# Patient Record
Sex: FEMALE | Race: WHITE | NOT HISPANIC OR LATINO | URBAN - METROPOLITAN AREA
[De-identification: names, ages, dates, MRNs, and addresses within clinical notes are randomized per-mention and may not be internally consistent; named-entity substitution may affect disease eponyms.]

---

## 2017-04-24 ENCOUNTER — EMERGENCY (EMERGENCY)
Facility: HOSPITAL | Age: 82
LOS: 1 days | Discharge: HOME | End: 2017-04-24
Attending: EMERGENCY MEDICINE | Admitting: EMERGENCY MEDICINE
Payer: MEDICARE

## 2017-04-24 VITALS
RESPIRATION RATE: 20 BRPM | SYSTOLIC BLOOD PRESSURE: 109 MMHG | OXYGEN SATURATION: 94 % | DIASTOLIC BLOOD PRESSURE: 62 MMHG | WEIGHT: 149.91 LBS | TEMPERATURE: 98 F | HEART RATE: 88 BPM | HEIGHT: 61 IN

## 2017-04-24 VITALS
SYSTOLIC BLOOD PRESSURE: 136 MMHG | HEART RATE: 84 BPM | TEMPERATURE: 98 F | DIASTOLIC BLOOD PRESSURE: 66 MMHG | OXYGEN SATURATION: 91 % | RESPIRATION RATE: 20 BRPM

## 2017-04-24 DIAGNOSIS — N18.4 CHRONIC KIDNEY DISEASE, STAGE 4 (SEVERE): ICD-10-CM

## 2017-04-24 DIAGNOSIS — I12.9 HYPERTENSIVE CHRONIC KIDNEY DISEASE WITH STAGE 1 THROUGH STAGE 4 CHRONIC KIDNEY DISEASE, OR UNSPECIFIED CHRONIC KIDNEY DISEASE: ICD-10-CM

## 2017-04-24 DIAGNOSIS — Z79.899 OTHER LONG TERM (CURRENT) DRUG THERAPY: ICD-10-CM

## 2017-04-24 DIAGNOSIS — Z79.82 LONG TERM (CURRENT) USE OF ASPIRIN: ICD-10-CM

## 2017-04-24 DIAGNOSIS — Z98.89 OTHER SPECIFIED POSTPROCEDURAL STATES: Chronic | ICD-10-CM

## 2017-04-24 DIAGNOSIS — E03.9 HYPOTHYROIDISM, UNSPECIFIED: ICD-10-CM

## 2017-04-24 DIAGNOSIS — Z90.710 ACQUIRED ABSENCE OF BOTH CERVIX AND UTERUS: Chronic | ICD-10-CM

## 2017-04-24 DIAGNOSIS — Z79.2 LONG TERM (CURRENT) USE OF ANTIBIOTICS: ICD-10-CM

## 2017-04-24 DIAGNOSIS — J40 BRONCHITIS, NOT SPECIFIED AS ACUTE OR CHRONIC: ICD-10-CM

## 2017-04-24 DIAGNOSIS — E78.5 HYPERLIPIDEMIA, UNSPECIFIED: ICD-10-CM

## 2017-04-24 DIAGNOSIS — R05 COUGH: ICD-10-CM

## 2017-04-24 LAB
ALBUMIN SERPL ELPH-MCNC: 3.1 G/DL — LOW (ref 3.4–5)
ALP SERPL-CCNC: 75 U/L — SIGNIFICANT CHANGE UP (ref 40–120)
ALT FLD-CCNC: 28 U/L — SIGNIFICANT CHANGE UP (ref 12–42)
ANION GAP SERPL CALC-SCNC: 11 MMOL/L — SIGNIFICANT CHANGE UP (ref 9–16)
AST SERPL-CCNC: 38 U/L — HIGH (ref 15–37)
BASOPHILS NFR BLD AUTO: 0.5 % — SIGNIFICANT CHANGE UP (ref 0–2)
BILIRUB SERPL-MCNC: 0.8 MG/DL — SIGNIFICANT CHANGE UP (ref 0.2–1.2)
BUN SERPL-MCNC: 43 MG/DL — HIGH (ref 7–23)
CALCIUM SERPL-MCNC: 8.5 MG/DL — SIGNIFICANT CHANGE UP (ref 8.5–10.5)
CHLORIDE SERPL-SCNC: 98 MMOL/L — SIGNIFICANT CHANGE UP (ref 96–108)
CO2 SERPL-SCNC: 29 MMOL/L — SIGNIFICANT CHANGE UP (ref 22–31)
CREAT SERPL-MCNC: 2.4 MG/DL — HIGH (ref 0.5–1.3)
EOSINOPHIL NFR BLD AUTO: 0.8 % — SIGNIFICANT CHANGE UP (ref 0–6)
GLUCOSE SERPL-MCNC: 169 MG/DL — HIGH (ref 70–99)
HCT VFR BLD CALC: 38.6 % — SIGNIFICANT CHANGE UP (ref 34.5–45)
HGB BLD-MCNC: 12 G/DL — SIGNIFICANT CHANGE UP (ref 11.5–15.5)
LYMPHOCYTES # BLD AUTO: 17.5 % — SIGNIFICANT CHANGE UP (ref 13–44)
MCHC RBC-ENTMCNC: 30.3 PG — SIGNIFICANT CHANGE UP (ref 27–34)
MCHC RBC-ENTMCNC: 31.1 G/DL — LOW (ref 32–36)
MCV RBC AUTO: 97.5 FL — SIGNIFICANT CHANGE UP (ref 80–100)
MONOCYTES NFR BLD AUTO: 11.1 % — SIGNIFICANT CHANGE UP (ref 2–14)
NEUTROPHILS NFR BLD AUTO: 70.1 % — SIGNIFICANT CHANGE UP (ref 43–77)
PLATELET # BLD AUTO: 181 K/UL — SIGNIFICANT CHANGE UP (ref 150–400)
POTASSIUM SERPL-MCNC: 4.6 MMOL/L — SIGNIFICANT CHANGE UP (ref 3.5–5.3)
POTASSIUM SERPL-SCNC: 4.6 MMOL/L — SIGNIFICANT CHANGE UP (ref 3.5–5.3)
PROT SERPL-MCNC: 7.3 G/DL — SIGNIFICANT CHANGE UP (ref 6.4–8.2)
RBC # BLD: 3.96 M/UL — SIGNIFICANT CHANGE UP (ref 3.8–5.2)
RBC # FLD: 14.7 % — SIGNIFICANT CHANGE UP (ref 10.3–16.9)
SODIUM SERPL-SCNC: 138 MMOL/L — SIGNIFICANT CHANGE UP (ref 135–145)
WBC # BLD: 6.2 K/UL — SIGNIFICANT CHANGE UP (ref 3.8–10.5)
WBC # FLD AUTO: 6.2 K/UL — SIGNIFICANT CHANGE UP (ref 3.8–10.5)

## 2017-04-24 PROCEDURE — 93005 ELECTROCARDIOGRAM TRACING: CPT

## 2017-04-24 PROCEDURE — 71010: CPT | Mod: 26

## 2017-04-24 PROCEDURE — 71045 X-RAY EXAM CHEST 1 VIEW: CPT

## 2017-04-24 PROCEDURE — 93010 ELECTROCARDIOGRAM REPORT: CPT

## 2017-04-24 PROCEDURE — 94640 AIRWAY INHALATION TREATMENT: CPT

## 2017-04-24 PROCEDURE — 99284 EMERGENCY DEPT VISIT MOD MDM: CPT | Mod: 25

## 2017-04-24 PROCEDURE — 85025 COMPLETE CBC W/AUTO DIFF WBC: CPT

## 2017-04-24 PROCEDURE — 87040 BLOOD CULTURE FOR BACTERIA: CPT

## 2017-04-24 PROCEDURE — 80053 COMPREHEN METABOLIC PANEL: CPT

## 2017-04-24 RX ORDER — ALBUTEROL 90 UG/1
2 AEROSOL, METERED ORAL
Qty: 1 | Refills: 0 | OUTPATIENT
Start: 2017-04-24 | End: 2017-05-24

## 2017-04-24 RX ORDER — IPRATROPIUM/ALBUTEROL SULFATE 18-103MCG
3 AEROSOL WITH ADAPTER (GRAM) INHALATION ONCE
Qty: 0 | Refills: 0 | Status: COMPLETED | OUTPATIENT
Start: 2017-04-24 | End: 2017-04-24

## 2017-04-24 RX ORDER — ALBUTEROL 90 UG/1
2.5 AEROSOL, METERED ORAL ONCE
Qty: 0 | Refills: 0 | Status: COMPLETED | OUTPATIENT
Start: 2017-04-24 | End: 2017-04-24

## 2017-04-24 RX ORDER — ALBUTEROL 90 UG/1
3 AEROSOL, METERED ORAL
Qty: 7 | Refills: 0 | OUTPATIENT
Start: 2017-04-24 | End: 2017-05-24

## 2017-04-24 RX ORDER — ALBUTEROL 90 UG/1
2 AEROSOL, METERED ORAL
Qty: 1 | Refills: 0
Start: 2017-04-24 | End: 2017-05-24

## 2017-04-24 RX ORDER — ALBUTEROL 90 UG/1
3 AEROSOL, METERED ORAL
Qty: 7 | Refills: 0
Start: 2017-04-24 | End: 2017-05-24

## 2017-04-24 RX ADMIN — Medication 3 MILLILITER(S): at 15:00

## 2017-04-24 RX ADMIN — Medication 60 MILLIGRAM(S): at 15:00

## 2017-04-24 RX ADMIN — ALBUTEROL 2.5 MILLIGRAM(S): 90 AEROSOL, METERED ORAL at 13:40

## 2017-04-24 NOTE — ED ADULT NURSE NOTE - PMH
Chronic kidney disease, stage IV (severe)    Essential hypertension    Other specified hypothyroidism    Urinary retention

## 2017-04-24 NOTE — ED PROVIDER NOTE - OBJECTIVE STATEMENT
88 y/o female with hx of HTN, HLD, and CKD c/o cough x 4 days. Pt states productive cough and wheezing. + pain to chest with coughing. no fever or chills. pt currently taking cipro. no nasal congestion or sore throat. no abd pain, n/v/d. no ha or dizziness. no leg pain or swelling. no further complaints.

## 2017-04-24 NOTE — ED ADULT NURSE NOTE - OBJECTIVE STATEMENT
88 y/o female c/o cough with green sputum for 1.5 weeks. pt has been on cipro for 5 days. denies any fever/chills, n/v/d, urinary symptoms. 86 y/o female c/o cough with green sputum for 1.5 weeks. pt has been on cipro for 5 days. denies any fever/chills, n/v/d, urinary symptoms. pt O2 sat 90% on room air, 97% on 4L. GABE Mendoza made aware. resting comfortably awaiting CXR.

## 2017-04-24 NOTE — ED PROVIDER NOTE - ATTENDING CONTRIBUTION TO CARE
88 yo F CKD p/w cough, wheezing, sob x 4 days despite oral cipro.  No chest pain.  VS noted.  Lung with bl exp wheezing, cxr clear labs noted.  Pt tx in ed with nebs, steroids with improvement.  Pt with o2 sat in low 90s on RA.   insisting on home tx with steroids and nebs and will return. Pt has 24 x7 RN care.  Pt ok with plan.  Likely bronchitis with RAD.  Do not suspect ACS, pe, dissection, CHF given context.  No PNA on CXR.

## 2017-04-24 NOTE — ED PROVIDER NOTE - MEDICAL DECISION MAKING DETAILS
cough, sob and wheezing. no acute changes on ecg. o2 sat 94% on arrival and lowered to 89% prior to nebs. prednisone given. labs noted. d/w pt bun and creatinine results. cxr no acute pathology. suspect bronchitis. pt offered admission given low O2 sat but pt refusing admission. d/w pt risks of going home. pt verbalizes understanding and still refusing admission. will d/c home with nebulizer, inhaler, prednisone and f/u with pmd

## 2017-04-29 LAB
CULTURE RESULTS: SIGNIFICANT CHANGE UP
CULTURE RESULTS: SIGNIFICANT CHANGE UP
SPECIMEN SOURCE: SIGNIFICANT CHANGE UP
SPECIMEN SOURCE: SIGNIFICANT CHANGE UP

## 2017-11-16 ENCOUNTER — OUTPATIENT (OUTPATIENT)
Dept: OUTPATIENT SERVICES | Facility: HOSPITAL | Age: 82
LOS: 1 days | End: 2017-11-16
Payer: MEDICARE

## 2017-11-16 ENCOUNTER — APPOINTMENT (OUTPATIENT)
Dept: PULMONOLOGY | Facility: CLINIC | Age: 82
End: 2017-11-16
Payer: MEDICARE

## 2017-11-16 DIAGNOSIS — Z98.89 OTHER SPECIFIED POSTPROCEDURAL STATES: Chronic | ICD-10-CM

## 2017-11-16 DIAGNOSIS — Z90.710 ACQUIRED ABSENCE OF BOTH CERVIX AND UTERUS: Chronic | ICD-10-CM

## 2017-11-16 PROCEDURE — 99204 OFFICE O/P NEW MOD 45 MIN: CPT | Mod: 25

## 2017-11-16 PROCEDURE — 71046 X-RAY EXAM CHEST 2 VIEWS: CPT

## 2017-11-16 PROCEDURE — 71020: CPT | Mod: 26

## 2017-11-17 ENCOUNTER — LABORATORY RESULT (OUTPATIENT)
Age: 82
End: 2017-11-17

## 2017-11-28 ENCOUNTER — APPOINTMENT (OUTPATIENT)
Dept: PULMONOLOGY | Facility: CLINIC | Age: 82
End: 2017-11-28

## 2019-01-22 ENCOUNTER — EMERGENCY (EMERGENCY)
Facility: HOSPITAL | Age: 84
LOS: 1 days | Discharge: HOME | End: 2019-01-22
Admitting: EMERGENCY MEDICINE
Payer: MEDICARE

## 2019-01-22 VITALS
TEMPERATURE: 98 F | DIASTOLIC BLOOD PRESSURE: 57 MMHG | WEIGHT: 145.06 LBS | HEIGHT: 63 IN | SYSTOLIC BLOOD PRESSURE: 114 MMHG | RESPIRATION RATE: 20 BRPM | OXYGEN SATURATION: 99 % | HEART RATE: 66 BPM

## 2019-01-22 DIAGNOSIS — Z98.89 OTHER SPECIFIED POSTPROCEDURAL STATES: Chronic | ICD-10-CM

## 2019-01-22 DIAGNOSIS — Z90.710 ACQUIRED ABSENCE OF BOTH CERVIX AND UTERUS: Chronic | ICD-10-CM

## 2019-01-22 PROCEDURE — 99284 EMERGENCY DEPT VISIT MOD MDM: CPT

## 2019-01-22 PROCEDURE — 93971 EXTREMITY STUDY: CPT | Mod: 26,RT

## 2019-01-22 RX ORDER — TETANUS TOXOID, REDUCED DIPHTHERIA TOXOID AND ACELLULAR PERTUSSIS VACCINE, ADSORBED 5; 2.5; 8; 8; 2.5 [IU]/.5ML; [IU]/.5ML; UG/.5ML; UG/.5ML; UG/.5ML
0.5 SUSPENSION INTRAMUSCULAR ONCE
Qty: 0 | Refills: 0 | Status: COMPLETED | OUTPATIENT
Start: 2019-01-22 | End: 2019-01-22

## 2019-01-22 RX ADMIN — TETANUS TOXOID, REDUCED DIPHTHERIA TOXOID AND ACELLULAR PERTUSSIS VACCINE, ADSORBED 0.5 MILLILITER(S): 5; 2.5; 8; 8; 2.5 SUSPENSION INTRAMUSCULAR at 18:08

## 2019-01-22 NOTE — ED PROVIDER NOTE - PHYSICAL EXAMINATION
GEN: awake, alert, NAD  EYES: Clear, Pupils equal and round.  PULM: Lungs clear  CV: RRR S1S2  GI: Abd soft, nontender  MSK: 8 cm crescentic superficial skin avulsion flap to left anterior shin.  +1 DP pulses bilat, brisk cap refill to toes.  Mild right popliteal tenderness.  +1 bilateral LE edema up to knees.  SKIN: normal color and turgor, no rash  NEURO: Alert, oriented. RAMIREZ normally.  Speech clear.  Gait steady.

## 2019-01-22 NOTE — ED PROVIDER NOTE - MEDICAL DECISION MAKING DETAILS
Superficial left shin skin tear.  Will perform local wound care and apply steri-strips.  Will get right LE venous doppler to evaluate right popliteal/calf pain r/o DVT.

## 2019-01-22 NOTE — ED ADULT NURSE NOTE - OBJECTIVE STATEMENT
Patient arrives in [place], boarded a cab and hit left lower leg against the railing /ramp of the cab they were getting into.  Arrives w/ pain 4/10 on left lower leg, dressing in place, bleeding controlled, denies blood thinners, cannot recall Tetanus vaccine status.

## 2019-01-22 NOTE — ED PROVIDER NOTE - NSFOLLOWUPINSTRUCTIONS_ED_ALL_ED_FT
Sterile Tape Wound Care  Some cuts and wounds can be closed using sterile tape, also called skin adhesive strips. Skin adhesive strips can be used for shallow (superficial) and simple cuts, wounds, lacerations, and some surgical incisions. These strips act in place of stitches, or in addition to stitches, to hold the edges of the wound together to allow for better healing. Unlike stitches, the adhesive strips do not require needles or anesthetic medicine for placement. The strips usually fall off on their own as the wound is healing. It is important to take proper care of your wound at home while it heals.    How to care for a sterile tape wound  Try to keep the area around your wound clean and dry. Do not allow the adhesive strips to get wet for the first 12 hours.  Do not use any soaps or ointments on the wound for the first 12 hours.  If a bandage (dressing) has been applied, keep it dry.  Follow instructions from your health care provider about how often to change the dressing.    Wash your hands with soap and water before you change your dressing. If soap and water are not available, use hand .  Change your dressing as told by your health care provider.  Leave adhesive strips in place. These skin closures may need to stay in place for 2 weeks or longer. If adhesive strip edges start to loosen and curl up, you may trim the loose edges. Do not remove adhesive strips completely unless your health care provider tells you to do that.    Do not scratch, rub, or pick at the wound area.  Protect the wound from further injury until it is healed.  Protect the wound from sun and tanning bed exposure while it is healing, and for several weeks after healing.  ImageCheck the wound every day for signs of infection. Check for:    More redness, swelling, or pain.  More fluid or blood.  Warmth.  Pus or a bad smell.    Follow these instructions at home:  Take over-the-counter and prescription medicines only as told by your health care provider.  Keep all follow-up visits as told by your health care provider. This is important.  Contact a health care provider if:  Your adhesive strips become soaked with blood or fall off before the wound has healed. The tape will need to be replaced.  You have a fever.  Get help right away if:  You have chills.  You develop a rash after the strips are applied.  You have a red streak that goes away from the wound.  You have more redness, swelling, or pain around your wound.  You have more fluid or blood coming from your wound.  Your wound feels warm to the touch.  You have pus or a bad smell coming from your wound.  Your wound breaks open.  This information is not intended to replace advice given to you by your health care provider. Make sure you discuss any questions you have with your health care provider.  __________________________________________  Tdap Vaccine (Tetanus, Diphtheria and Pertussis): What You Need to Know  1. Why get vaccinated?  Tetanus, diphtheria and pertussis are very serious diseases. Tdap vaccine can protect us from these diseases. And, Tdap vaccine given to pregnant women can protect  babies against pertussis.    TETANUS (Lockjaw) is rare in the [place] today. It causes painful muscle tightening and stiffness, usually all over the body.    It can lead to tightening of muscles in the head and neck so you can't open your mouth, swallow, or sometimes even breathe. Tetanus kills about 1 out of 10 people who are infected even after receiving the best medical care.    DIPHTHERIA is also rare in the [place] today. It can cause a thick coating to form in the back of the throat.    It can lead to breathing problems, heart failure, paralysis, and death.    PERTUSSIS (Whooping Cough) causes severe coughing spells, which can cause difficulty breathing, vomiting and disturbed sleep.    It can also lead to weight loss, incontinence, and rib fractures. Up to 2 in 100 adolescents and 5 in 100 adults with pertussis are hospitalized or have complications, which could include pneumonia or death.    These diseases are caused by bacteria. Diphtheria and pertussis are spread from person to person through secretions from coughing or sneezing. Tetanus enters the body through cuts, scratches, or wounds.    Before vaccines, as many as 200,000 cases of diphtheria, 200,000 cases of pertussis, and hundreds of cases of tetanus, were reported in the [place] each year. Since vaccination began, reports of cases for tetanus and diphtheria have dropped by about 99% and for pertussis by about 80%.    2. Tdap vaccine  Tdap vaccine can protect adolescents and adults from tetanus, diphtheria, and pertussis. One dose of Tdap is routinely given at age 11 or 12. People who did not get Tdap at that age should get it as soon as possible.    Tdap is especially important for healthcare professionals and anyone having close contact with a baby younger than 12 months.    Pregnant women should get a dose of Tdap during every pregnancy, to protect the  from pertussis. Infants are most at risk for severe, life-threatening complications from pertussis.    Another vaccine, called Td, protects against tetanus and diphtheria, but not pertussis. A Td booster should be given every 10 years. Tdap may be given as one of these boosters if you have never gotten Tdap before. Tdap may also be given after a severe cut or burn to prevent tetanus infection.    Your doctor or the person giving you the vaccine can give you more information.    Tdap may safely be given at the same time as other vaccines.    3. Some people should not get this vaccine  A person who has ever had a life-threatening allergic reaction after a previous dose of any diphtheria, tetanus or pertussis containing vaccine, OR has a severe allergy to any part of this vaccine, should not get Tdap vaccine. Tell the person giving the vaccine about any severe allergies.  Anyone who had coma or long repeated seizures within 7 days after a childhood dose of DTP or DTaP, or a previous dose of Tdap, should not get Tdap, unless a cause other than the vaccine was found. They can still get Td.  Talk to your doctor if you:    have seizures or another nervous system problem,  had severe pain or swelling after any vaccine containing diphtheria, tetanus or pertussis,  ever had a condition called Guillain-Barré Syndrome (GBS),  aren't feeling well on the day the shot is scheduled.    4. Risks  With any medicine, including vaccines, there is a chance of side effects. These are usually mild and go away on their own. Serious reactions are also possible but are rare.    Most people who get Tdap vaccine do not have any problems with it.    Mild problems following Tdap:     (Did not interfere with activities)    Pain where the shot was given (about 3 in 4 adolescents or 2 in 3 adults)  Redness or swelling where the shot was given (about 1 person in 5)  Mild fever of at least 100.4°F (up to about 1 in 25 adolescents or 1 in 100 adults)  Headache (about 3 or 4 people in 10)  Tiredness (about 1 person in 3 or 4)  Nausea, vomiting, diarrhea, stomach ache (up to 1 in 4 adolescents or 1 in 10 adults)  Chills, sore joints (about 1 person in 10)  Body aches (about 1 person in 3 or 4)  Rash, swollen glands (uncommon)    Moderate problems following Tdap:     (Interfered with activities, but did not require medical attention)    Pain where the shot was given (up to 1 in 5 or 6)  Redness or swelling where the shot was given (up to about 1 in 16 adolescents or 1 in 12 adults)  Fever over 102°F (about 1 in 100 adolescents or 1 in 250 adults)  Headache (about 1 in 7 adolescents or 1 in 10 adults)  Nausea, vomiting, diarrhea, stomach ache (up to 1 or 3 people in 100)  Swelling of the entire arm where the shot was given (up to about 1 in 500).    Severe problems following Tdap:     (Unable to perform usual activities; required medical attention)    Swelling, severe pain, bleeding and redness in the arm where the shot was given (rare).    Problems that could happen after any vaccine:     People sometimes faint after a medical procedure, including vaccination. Sitting or lying down for about 15 minutes can help prevent fainting, and injuries caused by a fall. Tell your doctor if you feel dizzy, or have vision changes or ringing in the ears.  Some people get severe pain in the shoulder and have difficulty moving the arm where a shot was given. This happens very rarely.  Any medication can cause a severe allergic reaction. Such reactions from a vaccine are very rare, estimated at fewer than 1 in a million doses, and would happen within a few minutes to a few hours after the vaccination.  As with any medicine, there is a very remote chance of a vaccine causing a serious injury or death.    The safety of vaccines is always being monitored. For more information, visit: www.cdc.gov/vaccinesafety/    5. What if there is a serious problem?  What should I look for?     Look for anything that concerns you, such as signs of a severe allergic reaction, very high fever, or unusual behavior.    Signs of a severe allergic reaction can include hives, swelling of the face and throat, difficulty breathing, a fast heartbeat, dizziness, and weakness. These would usually start a few minutes to a few hours after the vaccination.    What should I do?     If you think it is a severe allergic reaction or other emergency that can’t wait, call  or get the person to the nearest hospital. Otherwise, call your doctor.  Afterward, the reaction should be reported to the Vaccine Adverse Event Reporting System (VAERS). Your doctor might file this report, or you can do it yourself through the VAERS web site at www.vaers.[facility].gov, or by calling 1-191.919.6432.    VAERS does not give medical advice.    6. The National Vaccine Injury Compensation Program  The National Vaccine Injury Compensation Program (VICP) is a federal program that was created to compensate people who may have been injured by certain vaccines.    Persons who believe they may have been injured by a vaccine can learn about the program and about filing a claim by calling 1-131.395.3522 or visiting the VICP website at www.[facility].gov/vaccinecompensation. There is a time limit to file a claim for compensation.    7. How can I learn more?  Ask your doctor. He or she can give you the vaccine package insert or suggest other sources of information.  Call your local or state health department.  Contact the Centers for Disease Control and Prevention (CDC):    Call 1-673.342.2310 (6-103-EGZ-INFO) or  Visit CDC’s website at www.cdc.gov/vaccines    CDC Tdap Vaccine VIS (2/24/15)    This information is not intended to replace advice given to you by your health care provider. Make sure you discuss any questions you have with your health care provider.  ______________________________________________________  BAKERS CYST - AfterCare(R) Instructions(ER/ED)     Bakers Cyst    WHAT YOU NEED TO KNOW:    A Bakers cyst, or popliteal cyst, is a bulging lump behind your knee. Inside the lump is a sac filled with fluid. The cyst is caused by fluid buildup in your knee joint. This can happen if you have a knee injury, such as a cartilage tear. Osteoarthritis or rheumatoid arthritis can also cause an abnormal buildup of joint fluid.     DISCHARGE INSTRUCTIONS:    Return to the emergency department if:     You have severe pain.      You have bruising on the ankle below the cyst.      Your calf turns blue below the cyst.      Your calf or knee is swollen or bleeding.    Contact your healthcare provider if:     You have a fever.      Your pain does not improve with medicine.      You have questions or concerns about your condition or care.    Medicines:     NSAIDs help decrease swelling and pain. This medicine is available without a doctor's order. Your healthcare provider will tell you which medicine to take and how often to take it. Follow directions. NSAIDs can cause stomach bleeding or kidney problems if they are not taken correctly.      Take your medicine as directed. Contact your healthcare provider if you think your medicine is not helping or if you have side effects. Tell him of her if you are allergic to any medicine. Keep a list of the medicines, vitamins, and herbs you take. Include the amounts, and when and why you take them. Bring the list or the pill bottles to follow-up visits. Carry your medicine list with you in case of an emergency.    Care for your knee:     Rest as needed. Limit movement as your knee heals. This will help decrease the risk of more damage to your knee. You may need crutches to take weight off your injured knee. Use crutches as directed.      Ice your knee. Ice helps decrease swelling and pain. Use an ice pack, or put ice in a plastic bag. Cover the ice pack with a towel and place the ice on your knee for 15 to 20 minutes, 3 to 4 times each day. Do this for 2 to 3 days.      Support your knee. Wrap your knee with an elastic bandage. Ask your healthcare provider if you need a brace for more support. This will help decrease swelling and movement so your knee can heal.      Elevate your knee. Use pillows to raise your knee above the level of your heart as often as you can. This will help decrease swelling.      Go to physical therapy as directed. A physical therapist teaches you exercises to help improve movement and strength, and to decrease pain.     Follow up with your healthcare provider as directed: Write down your questions so you remember to ask them during your visits.

## 2019-01-22 NOTE — ED ADULT TRIAGE NOTE - CHIEF COMPLAINT QUOTE
Patient came in for left leg laceration , got scraped inside the cab . Not on thinner . Active bleeding noted .

## 2019-01-22 NOTE — ED PROVIDER NOTE - CARE PLAN
Principal Discharge DX:	Laceration of left lower leg, initial encounter  Secondary Diagnosis:	Baker cyst, left

## 2019-01-22 NOTE — ED PROVIDER NOTE - OBJECTIVE STATEMENT
pt is 88 yo fem without significant PMHx who struck her left shin against floorboard while getting into SUV this afternoon, sustaining a skin tear to the left shin.  She's been having right popliteal pain for the past day, which is what caused her to have difficulty lifting the left leg enough to climb into the vehicle.  No hx of DVT.  No recent immobilization. She had just arrived home from vacation in [place] when this happened at the airport.  Unknown last tetanus booster.  No hx of diabetes, steroid use, or other immunosuppression.

## 2019-01-22 NOTE — ED PROVIDER NOTE - NS ED ROS FT
CONST:  no fever/chills  NEURO: no headache or dizziness, no weakness  CARDIO: no chest pain or palpitations  PULM: no dyspnea, GARDNER, or orthopnea  GI: no abdominal pain, nausea or vomiting

## 2019-01-24 ENCOUNTER — INPATIENT (INPATIENT)
Facility: HOSPITAL | Age: 84
LOS: 0 days | Discharge: HOME HEALTH | DRG: 194 | End: 2019-01-25
Payer: MEDICARE

## 2019-01-24 VITALS
HEART RATE: 79 BPM | OXYGEN SATURATION: 96 % | DIASTOLIC BLOOD PRESSURE: 71 MMHG | SYSTOLIC BLOOD PRESSURE: 136 MMHG | HEIGHT: 63.5 IN | WEIGHT: 139.99 LBS | TEMPERATURE: 98 F | RESPIRATION RATE: 19 BRPM

## 2019-01-24 DIAGNOSIS — Z90.710 ACQUIRED ABSENCE OF BOTH CERVIX AND UTERUS: Chronic | ICD-10-CM

## 2019-01-24 DIAGNOSIS — Z98.89 OTHER SPECIFIED POSTPROCEDURAL STATES: Chronic | ICD-10-CM

## 2019-01-24 LAB
ANION GAP SERPL CALC-SCNC: 12 MMOL/L — SIGNIFICANT CHANGE UP (ref 5–17)
APPEARANCE UR: ABNORMAL
BASOPHILS NFR BLD AUTO: 0.6 % — SIGNIFICANT CHANGE UP (ref 0–2)
BILIRUB UR-MCNC: NEGATIVE — SIGNIFICANT CHANGE UP
BUN SERPL-MCNC: 48 MG/DL — HIGH (ref 7–23)
CALCIUM SERPL-MCNC: 9.6 MG/DL — SIGNIFICANT CHANGE UP (ref 8.4–10.5)
CHLORIDE SERPL-SCNC: 100 MMOL/L — SIGNIFICANT CHANGE UP (ref 96–108)
CO2 SERPL-SCNC: 25 MMOL/L — SIGNIFICANT CHANGE UP (ref 22–31)
COLOR SPEC: YELLOW — SIGNIFICANT CHANGE UP
CREAT SERPL-MCNC: 2.78 MG/DL — HIGH (ref 0.5–1.3)
DIFF PNL FLD: ABNORMAL
EOSINOPHIL NFR BLD AUTO: 1.1 % — SIGNIFICANT CHANGE UP (ref 0–6)
EXTRA BLUE TOP TUBE: SIGNIFICANT CHANGE UP
EXTRA SST TUBE: SIGNIFICANT CHANGE UP
GLUCOSE SERPL-MCNC: 103 MG/DL — HIGH (ref 70–99)
GLUCOSE UR QL: NEGATIVE — SIGNIFICANT CHANGE UP
HCT VFR BLD CALC: 36.9 % — SIGNIFICANT CHANGE UP (ref 34.5–45)
HGB BLD-MCNC: 11.1 G/DL — LOW (ref 11.5–15.5)
KETONES UR-MCNC: NEGATIVE — SIGNIFICANT CHANGE UP
LEUKOCYTE ESTERASE UR-ACNC: ABNORMAL
LYMPHOCYTES # BLD AUTO: 19.2 % — SIGNIFICANT CHANGE UP (ref 13–44)
MAGNESIUM SERPL-MCNC: 2.5 MG/DL — SIGNIFICANT CHANGE UP (ref 1.6–2.6)
MCHC RBC-ENTMCNC: 30 PG — SIGNIFICANT CHANGE UP (ref 27–34)
MCHC RBC-ENTMCNC: 30.1 G/DL — LOW (ref 32–36)
MCV RBC AUTO: 99.7 FL — SIGNIFICANT CHANGE UP (ref 80–100)
MONOCYTES NFR BLD AUTO: 14.4 % — HIGH (ref 2–14)
NEUTROPHILS NFR BLD AUTO: 64.7 % — SIGNIFICANT CHANGE UP (ref 43–77)
NITRITE UR-MCNC: POSITIVE
NT-PROBNP SERPL-SCNC: 2554 PG/ML — HIGH (ref 0–300)
PH UR: 6 — SIGNIFICANT CHANGE UP (ref 5–8)
PLATELET # BLD AUTO: 195 K/UL — SIGNIFICANT CHANGE UP (ref 150–400)
POTASSIUM SERPL-MCNC: SIGNIFICANT CHANGE UP MMOL/L (ref 3.5–5.3)
POTASSIUM SERPL-SCNC: SIGNIFICANT CHANGE UP MMOL/L (ref 3.5–5.3)
PROT UR-MCNC: NEGATIVE MG/DL — SIGNIFICANT CHANGE UP
RAPID RVP RESULT: SIGNIFICANT CHANGE UP
RBC # BLD: 3.7 M/UL — LOW (ref 3.8–5.2)
RBC # FLD: 14.1 % — SIGNIFICANT CHANGE UP (ref 10.3–16.9)
SODIUM SERPL-SCNC: 137 MMOL/L — SIGNIFICANT CHANGE UP (ref 135–145)
SP GR SPEC: 1.01 — SIGNIFICANT CHANGE UP (ref 1–1.03)
UROBILINOGEN FLD QL: 0.2 E.U./DL — SIGNIFICANT CHANGE UP
WBC # BLD: 8.4 K/UL — SIGNIFICANT CHANGE UP (ref 3.8–10.5)
WBC # FLD AUTO: 8.4 K/UL — SIGNIFICANT CHANGE UP (ref 3.8–10.5)

## 2019-01-24 PROCEDURE — 71045 X-RAY EXAM CHEST 1 VIEW: CPT | Mod: 26

## 2019-01-24 PROCEDURE — 99285 EMERGENCY DEPT VISIT HI MDM: CPT

## 2019-01-24 RX ORDER — AZITHROMYCIN 500 MG/1
500 TABLET, FILM COATED ORAL ONCE
Qty: 0 | Refills: 0 | Status: COMPLETED | OUTPATIENT
Start: 2019-01-24 | End: 2019-01-24

## 2019-01-24 RX ORDER — CEFTRIAXONE 500 MG/1
1 INJECTION, POWDER, FOR SOLUTION INTRAMUSCULAR; INTRAVENOUS ONCE
Qty: 0 | Refills: 0 | Status: COMPLETED | OUTPATIENT
Start: 2019-01-24 | End: 2019-01-24

## 2019-01-24 RX ORDER — IPRATROPIUM/ALBUTEROL SULFATE 18-103MCG
3 AEROSOL WITH ADAPTER (GRAM) INHALATION
Qty: 0 | Refills: 0 | Status: COMPLETED | OUTPATIENT
Start: 2019-01-24 | End: 2019-01-24

## 2019-01-24 RX ADMIN — AZITHROMYCIN 255 MILLIGRAM(S): 500 TABLET, FILM COATED ORAL at 23:03

## 2019-01-24 RX ADMIN — Medication 125 MILLIGRAM(S): at 20:47

## 2019-01-24 RX ADMIN — Medication 3 MILLILITER(S): at 20:08

## 2019-01-24 RX ADMIN — Medication 3 MILLILITER(S): at 20:47

## 2019-01-24 RX ADMIN — Medication 3 MILLILITER(S): at 20:28

## 2019-01-24 RX ADMIN — CEFTRIAXONE 100 GRAM(S): 500 INJECTION, POWDER, FOR SOLUTION INTRAMUSCULAR; INTRAVENOUS at 22:37

## 2019-01-24 NOTE — ED ADULT NURSE NOTE - CHPI ED NUR SYMPTOMS NEG
no bleeding gums/no chills/no fever/no numbness/no vomiting/no loss of consciousness/no nausea/no syncope/no weakness

## 2019-01-24 NOTE — ED PROVIDER NOTE - PHYSICAL EXAMINATION
Constitutional: Well appearing, well nourished, awake, alert, oriented to person, place, time/situation and in no apparent distress.  ENMT: Airway patent. Normal MM. No erythema or exudates in oropharynx. No tongue / lip / uvula / pharyngeal / sublingual edema. No oral lesions. Uvula is midline. No drooling or stridor. Normal phonation. TMI b/l. Mild facial edema best noted in b/l infraorbital regions. no mastoid erythema / warmth / ttp.  Eyes: Clear bilaterally  Cardiac: Normal rate, regular rhythm.  Heart sounds S1, S2.  No murmurs, rubs or gallops.  Respiratory: Coarse BS b/l w/ diffuse exp wheezing. No increased WOB, tachypnea, or accessory mm use. O2 sat on RA 89-90%. Pt speaks in full sentences.   Gastrointestinal: Abd soft, NT, ND, NABS. No guarding, rebound, or rigidity. No pulsatile abdominal masses. No organomegaly appreciated. No CVAT   Musculoskeletal: Range of motion is not limited. Trace edema x 4 ext. No calf ttp.   Neuro: Alert and oriented x 3, face symmetric and speech fluent. Strength 5/5 x 4 ext and symmetric, nml gross motor movement, nml gait. No focal deficits noted.  Skin: Skin normal color for race, warm, dry and intact. No evidence of rash.  Psych: Alert and oriented to person, place, time/situation. normal mood and affect. no apparent risk to self or others.

## 2019-01-24 NOTE — ED PROVIDER NOTE - SECONDARY DIAGNOSIS.
Reactive airway disease without complication, unspecified asthma severity, unspecified whether persistent Pneumonia due to infectious organism, unspecified laterality, unspecified part of lung

## 2019-01-24 NOTE — ED PROVIDER NOTE - CARE PLAN
Principal Discharge DX:	Urinary tract infection without hematuria, site unspecified  Secondary Diagnosis:	Reactive airway disease without complication, unspecified asthma severity, unspecified whether persistent  Secondary Diagnosis:	Pneumonia due to infectious organism, unspecified laterality, unspecified part of lung

## 2019-01-24 NOTE — ED ADULT NURSE REASSESSMENT NOTE - NS ED NURSE REASSESS COMMENT FT1
Pt was received from change of shift  at 8pm. Pt medical evaluation in progress at 8pm by Dr. Ramon REYNA at that time. Pt labs sent, and medication administered from time ordered. Pt has 20G right forearm. ENT at bedside at this time for pt evaluation. pt speaks clear, MAEx4, has unlabored breathing. Abd obese soft nt nd. Skin dry warm.

## 2019-01-24 NOTE — ED ADULT NURSE NOTE - OBJECTIVE STATEMENT
Patient presents with  at bedside c/o L ear pain x several days that was intermittent and radiating to L side of neck. Denies SOB/CP--no cyanosis noted, speaking in full, complete sentences. No obvious trauma or injury noted.

## 2019-01-24 NOTE — ED PROVIDER NOTE - OBJECTIVE STATEMENT
Pt w/ PMHx HTN, HLD, hypothyroidism, CKD, ? CHF, ? hx lung disease (pt denies, but family states baseline hypoxia 89-91 w/o home O2, hx RAD) p/w 5 days of cough, hoarseness of voice, L ear pain, facial swelling, dec urine output, and foul smelling urine. Sx began s/p returning from [place]. No CP, SOB. No n/v/d, abd pain. No known f/c. Pt was in the ED a few days ago s/p fall, laceration to leg, had LE doppler at that time, neg for DVT (+Baker's cyst).

## 2019-01-24 NOTE — ED ADULT NURSE NOTE - NSIMPLEMENTINTERV_GEN_ALL_ED
Implemented All Fall with Harm Risk Interventions:  [place] to call system. Call bell, personal items and telephone within reach. Instruct patient to call for assistance. Room bathroom lighting operational. Non-slip footwear when patient is off stretcher. Physically safe environment: no spills, clutter or unnecessary equipment. Stretcher in lowest position, wheels locked, appropriate side rails in place. Provide visual cue, wrist band, yellow gown, etc. Monitor gait and stability. Monitor for mental status changes and reorient to person, place, and time. Review medications for side effects contributing to fall risk. Reinforce activity limits and safety measures with patient and family. Provide visual clues: red socks.

## 2019-01-24 NOTE — ED PROVIDER NOTE - DIAGNOSTIC INTERPRETATION
CHEST XRAY INTERPRETATION: possible infiltrate L lung, probable congestion, heart shadow normal, bony structures intact

## 2019-01-24 NOTE — CONSULT NOTE ADULT - SUBJECTIVE AND OBJECTIVE BOX
[facility] Consult Note    89F known to Dr. Centeno presents with two days of chills and malaise. Pt reports she recently returned from a trip to [place]. she was recently in the ED for leg injury. Starting yesterday morning, she began having chills and subjective fevers with cough productive of thick sputum. She also has a sore throat and some hoarseness with L ear pain. Per report has not been taking her Lasix and has had some facial swelling as a result. She has not been able to eat normally. Her aid is also concerned that she has a UTI. Likely admitted to medicine for infectious workup.    Exam  Awake, alert, NAD  EOMI, s/p L eye surgery with incomplete closure on blink, sensation and movement otherwise intact  Symmetric lower facial/neck swelling  AU TM clear and intact, hearing grossly intact  OC/OP clear no masses no lesions, mild erythema on soft palate and superior tonsils, no exudates, clear saliva from bilateral Arnulfo's and Josette's ducts.    A/P  89F with 2 days of malaise, reduced PO intake, and edema.  - Agree with medicine evaluation and possible admission  - No acute ENT issues; ear pain is likely referred  - Page with questions

## 2019-01-24 NOTE — ED PROVIDER NOTE - MEDICAL DECISION MAKING DETAILS
Pt p/w URI sx, wheezing, sx UTI. Start duonebs, steroids. Check RVP / CXR. Fluid restrict. Possible CHF. Abx for UTI. Admit.

## 2019-01-25 ENCOUNTER — TRANSCRIPTION ENCOUNTER (OUTPATIENT)
Age: 84
End: 2019-01-25

## 2019-01-25 VITALS
TEMPERATURE: 98 F | HEART RATE: 80 BPM | DIASTOLIC BLOOD PRESSURE: 61 MMHG | SYSTOLIC BLOOD PRESSURE: 107 MMHG | OXYGEN SATURATION: 94 % | RESPIRATION RATE: 18 BRPM

## 2019-01-25 DIAGNOSIS — N39.0 URINARY TRACT INFECTION, SITE NOT SPECIFIED: ICD-10-CM

## 2019-01-25 DIAGNOSIS — J18.9 PNEUMONIA, UNSPECIFIED ORGANISM: ICD-10-CM

## 2019-01-25 DIAGNOSIS — E03.9 HYPOTHYROIDISM, UNSPECIFIED: ICD-10-CM

## 2019-01-25 DIAGNOSIS — R63.8 OTHER SYMPTOMS AND SIGNS CONCERNING FOOD AND FLUID INTAKE: ICD-10-CM

## 2019-01-25 DIAGNOSIS — N18.9 CHRONIC KIDNEY DISEASE, UNSPECIFIED: ICD-10-CM

## 2019-01-25 DIAGNOSIS — J45.909 UNSPECIFIED ASTHMA, UNCOMPLICATED: ICD-10-CM

## 2019-01-25 DIAGNOSIS — I10 ESSENTIAL (PRIMARY) HYPERTENSION: ICD-10-CM

## 2019-01-25 DIAGNOSIS — Z29.9 ENCOUNTER FOR PROPHYLACTIC MEASURES, UNSPECIFIED: ICD-10-CM

## 2019-01-25 LAB
ANION GAP SERPL CALC-SCNC: 15 MMOL/L — SIGNIFICANT CHANGE UP (ref 5–17)
ANION GAP SERPL CALC-SCNC: 15 MMOL/L — SIGNIFICANT CHANGE UP (ref 5–17)
BUN SERPL-MCNC: 51 MG/DL — HIGH (ref 7–23)
BUN SERPL-MCNC: 54 MG/DL — HIGH (ref 7–23)
CALCIUM SERPL-MCNC: 8.9 MG/DL — SIGNIFICANT CHANGE UP (ref 8.4–10.5)
CALCIUM SERPL-MCNC: 9.1 MG/DL — SIGNIFICANT CHANGE UP (ref 8.4–10.5)
CHLORIDE SERPL-SCNC: 100 MMOL/L — SIGNIFICANT CHANGE UP (ref 96–108)
CHLORIDE SERPL-SCNC: 97 MMOL/L — SIGNIFICANT CHANGE UP (ref 96–108)
CO2 SERPL-SCNC: 24 MMOL/L — SIGNIFICANT CHANGE UP (ref 22–31)
CO2 SERPL-SCNC: 26 MMOL/L — SIGNIFICANT CHANGE UP (ref 22–31)
CREAT SERPL-MCNC: 2.84 MG/DL — HIGH (ref 0.5–1.3)
CREAT SERPL-MCNC: 2.93 MG/DL — HIGH (ref 0.5–1.3)
GLUCOSE BLDC GLUCOMTR-MCNC: 168 MG/DL — HIGH (ref 70–99)
GLUCOSE BLDC GLUCOMTR-MCNC: 222 MG/DL — HIGH (ref 70–99)
GLUCOSE BLDC GLUCOMTR-MCNC: 234 MG/DL — HIGH (ref 70–99)
GLUCOSE SERPL-MCNC: 181 MG/DL — HIGH (ref 70–99)
GLUCOSE SERPL-MCNC: 270 MG/DL — HIGH (ref 70–99)
HCT VFR BLD CALC: 35.1 % — SIGNIFICANT CHANGE UP (ref 34.5–45)
HGB BLD-MCNC: 10.6 G/DL — LOW (ref 11.5–15.5)
MAGNESIUM SERPL-MCNC: 2.4 MG/DL — SIGNIFICANT CHANGE UP (ref 1.6–2.6)
MCHC RBC-ENTMCNC: 29.9 PG — SIGNIFICANT CHANGE UP (ref 27–34)
MCHC RBC-ENTMCNC: 30.2 G/DL — LOW (ref 32–36)
MCV RBC AUTO: 99.2 FL — SIGNIFICANT CHANGE UP (ref 80–100)
PLATELET # BLD AUTO: 198 K/UL — SIGNIFICANT CHANGE UP (ref 150–400)
POTASSIUM SERPL-MCNC: 4.8 MMOL/L — SIGNIFICANT CHANGE UP (ref 3.5–5.3)
POTASSIUM SERPL-MCNC: 5.5 MMOL/L — HIGH (ref 3.5–5.3)
POTASSIUM SERPL-SCNC: 4.8 MMOL/L — SIGNIFICANT CHANGE UP (ref 3.5–5.3)
POTASSIUM SERPL-SCNC: 5.5 MMOL/L — HIGH (ref 3.5–5.3)
RBC # BLD: 3.54 M/UL — LOW (ref 3.8–5.2)
RBC # FLD: 13.9 % — SIGNIFICANT CHANGE UP (ref 10.3–16.9)
SODIUM SERPL-SCNC: 136 MMOL/L — SIGNIFICANT CHANGE UP (ref 135–145)
SODIUM SERPL-SCNC: 141 MMOL/L — SIGNIFICANT CHANGE UP (ref 135–145)
T4 FREE SERPL-MCNC: 1.24 NG/DL — SIGNIFICANT CHANGE UP (ref 0.7–1.48)
TSH SERPL-MCNC: 1.76 UIU/ML — SIGNIFICANT CHANGE UP (ref 0.35–4.94)
WBC # BLD: 7.3 K/UL — SIGNIFICANT CHANGE UP (ref 3.8–10.5)
WBC # FLD AUTO: 7.3 K/UL — SIGNIFICANT CHANGE UP (ref 3.8–10.5)

## 2019-01-25 PROCEDURE — 90715 TDAP VACCINE 7 YRS/> IM: CPT

## 2019-01-25 PROCEDURE — 85025 COMPLETE CBC W/AUTO DIFF WBC: CPT

## 2019-01-25 PROCEDURE — 87633 RESP VIRUS 12-25 TARGETS: CPT

## 2019-01-25 PROCEDURE — 36415 COLL VENOUS BLD VENIPUNCTURE: CPT

## 2019-01-25 PROCEDURE — 87186 SC STD MICRODIL/AGAR DIL: CPT

## 2019-01-25 PROCEDURE — 87040 BLOOD CULTURE FOR BACTERIA: CPT

## 2019-01-25 PROCEDURE — 84443 ASSAY THYROID STIM HORMONE: CPT

## 2019-01-25 PROCEDURE — 80048 BASIC METABOLIC PNL TOTAL CA: CPT

## 2019-01-25 PROCEDURE — 93971 EXTREMITY STUDY: CPT

## 2019-01-25 PROCEDURE — 94640 AIRWAY INHALATION TREATMENT: CPT

## 2019-01-25 PROCEDURE — 71045 X-RAY EXAM CHEST 1 VIEW: CPT

## 2019-01-25 PROCEDURE — 95819 EEG AWAKE AND ASLEEP: CPT | Mod: 26

## 2019-01-25 PROCEDURE — 99239 HOSP IP/OBS DSCHRG MGMT >30: CPT

## 2019-01-25 PROCEDURE — 84439 ASSAY OF FREE THYROXINE: CPT

## 2019-01-25 PROCEDURE — 93010 ELECTROCARDIOGRAM REPORT: CPT

## 2019-01-25 PROCEDURE — 99285 EMERGENCY DEPT VISIT HI MDM: CPT | Mod: 25

## 2019-01-25 PROCEDURE — 87086 URINE CULTURE/COLONY COUNT: CPT

## 2019-01-25 PROCEDURE — 87581 M.PNEUMON DNA AMP PROBE: CPT

## 2019-01-25 PROCEDURE — 83735 ASSAY OF MAGNESIUM: CPT

## 2019-01-25 PROCEDURE — 87486 CHLMYD PNEUM DNA AMP PROBE: CPT

## 2019-01-25 PROCEDURE — 83880 ASSAY OF NATRIURETIC PEPTIDE: CPT

## 2019-01-25 PROCEDURE — 85027 COMPLETE CBC AUTOMATED: CPT

## 2019-01-25 PROCEDURE — 93005 ELECTROCARDIOGRAM TRACING: CPT

## 2019-01-25 PROCEDURE — 87798 DETECT AGENT NOS DNA AMP: CPT

## 2019-01-25 PROCEDURE — 96374 THER/PROPH/DIAG INJ IV PUSH: CPT

## 2019-01-25 PROCEDURE — 82962 GLUCOSE BLOOD TEST: CPT

## 2019-01-25 PROCEDURE — 81001 URINALYSIS AUTO W/SCOPE: CPT

## 2019-01-25 RX ORDER — COLCHICINE 0.6 MG
0.3 TABLET ORAL DAILY
Qty: 0 | Refills: 0 | Status: DISCONTINUED | OUTPATIENT
Start: 2019-01-25 | End: 2019-01-25

## 2019-01-25 RX ORDER — AZITHROMYCIN 500 MG/1
500 TABLET, FILM COATED ORAL DAILY
Qty: 0 | Refills: 0 | Status: DISCONTINUED | OUTPATIENT
Start: 2019-01-25 | End: 2019-01-25

## 2019-01-25 RX ORDER — INSULIN LISPRO 100/ML
VIAL (ML) SUBCUTANEOUS
Qty: 0 | Refills: 0 | Status: DISCONTINUED | OUTPATIENT
Start: 2019-01-25 | End: 2019-01-25

## 2019-01-25 RX ORDER — DEXTROSE 50 % IN WATER 50 %
12.5 SYRINGE (ML) INTRAVENOUS ONCE
Qty: 0 | Refills: 0 | Status: DISCONTINUED | OUTPATIENT
Start: 2019-01-25 | End: 2019-01-25

## 2019-01-25 RX ORDER — IPRATROPIUM/ALBUTEROL SULFATE 18-103MCG
3 AEROSOL WITH ADAPTER (GRAM) INHALATION ONCE
Qty: 0 | Refills: 0 | Status: COMPLETED | OUTPATIENT
Start: 2019-01-25 | End: 2019-01-25

## 2019-01-25 RX ORDER — CEFTRIAXONE 500 MG/1
1 INJECTION, POWDER, FOR SOLUTION INTRAMUSCULAR; INTRAVENOUS EVERY 24 HOURS
Qty: 0 | Refills: 0 | Status: DISCONTINUED | OUTPATIENT
Start: 2019-01-25 | End: 2019-01-25

## 2019-01-25 RX ORDER — PANTOPRAZOLE SODIUM 20 MG/1
40 TABLET, DELAYED RELEASE ORAL
Qty: 0 | Refills: 0 | Status: DISCONTINUED | OUTPATIENT
Start: 2019-01-25 | End: 2019-01-25

## 2019-01-25 RX ORDER — FUROSEMIDE 40 MG
80 TABLET ORAL
Qty: 0 | Refills: 0 | Status: DISCONTINUED | OUTPATIENT
Start: 2019-01-25 | End: 2019-01-25

## 2019-01-25 RX ORDER — ASPIRIN/CALCIUM CARB/MAGNESIUM 324 MG
81 TABLET ORAL DAILY
Qty: 0 | Refills: 0 | Status: DISCONTINUED | OUTPATIENT
Start: 2019-01-25 | End: 2019-01-25

## 2019-01-25 RX ORDER — AZITHROMYCIN 500 MG/1
1 TABLET, FILM COATED ORAL
Qty: 4 | Refills: 0
Start: 2019-01-25 | End: 2019-01-28

## 2019-01-25 RX ORDER — HEPARIN SODIUM 5000 [USP'U]/ML
5000 INJECTION INTRAVENOUS; SUBCUTANEOUS EVERY 8 HOURS
Qty: 0 | Refills: 0 | Status: DISCONTINUED | OUTPATIENT
Start: 2019-01-25 | End: 2019-01-25

## 2019-01-25 RX ORDER — DEXTROSE 50 % IN WATER 50 %
15 SYRINGE (ML) INTRAVENOUS ONCE
Qty: 0 | Refills: 0 | Status: DISCONTINUED | OUTPATIENT
Start: 2019-01-25 | End: 2019-01-25

## 2019-01-25 RX ORDER — SODIUM POLYSTYRENE SULFONATE 4.1 MEQ/G
30 POWDER, FOR SUSPENSION ORAL ONCE
Qty: 0 | Refills: 0 | Status: COMPLETED | OUTPATIENT
Start: 2019-01-25 | End: 2019-01-25

## 2019-01-25 RX ORDER — SODIUM CHLORIDE 9 MG/ML
1000 INJECTION, SOLUTION INTRAVENOUS
Qty: 0 | Refills: 0 | Status: DISCONTINUED | OUTPATIENT
Start: 2019-01-25 | End: 2019-01-25

## 2019-01-25 RX ORDER — DEXTROSE 50 % IN WATER 50 %
25 SYRINGE (ML) INTRAVENOUS ONCE
Qty: 0 | Refills: 0 | Status: DISCONTINUED | OUTPATIENT
Start: 2019-01-25 | End: 2019-01-25

## 2019-01-25 RX ORDER — DULOXETINE HYDROCHLORIDE 30 MG/1
20 CAPSULE, DELAYED RELEASE ORAL DAILY
Qty: 0 | Refills: 0 | Status: DISCONTINUED | OUTPATIENT
Start: 2019-01-25 | End: 2019-01-25

## 2019-01-25 RX ORDER — CHOLECALCIFEROL (VITAMIN D3) 125 MCG
1000 CAPSULE ORAL DAILY
Qty: 0 | Refills: 0 | Status: DISCONTINUED | OUTPATIENT
Start: 2019-01-25 | End: 2019-01-25

## 2019-01-25 RX ORDER — IPRATROPIUM/ALBUTEROL SULFATE 18-103MCG
3 AEROSOL WITH ADAPTER (GRAM) INHALATION EVERY 6 HOURS
Qty: 0 | Refills: 0 | Status: DISCONTINUED | OUTPATIENT
Start: 2019-01-25 | End: 2019-01-25

## 2019-01-25 RX ORDER — CEFPODOXIME PROXETIL 100 MG
1 TABLET ORAL
Qty: 8 | Refills: 0
Start: 2019-01-25 | End: 2019-01-28

## 2019-01-25 RX ORDER — GLUCAGON INJECTION, SOLUTION 0.5 MG/.1ML
1 INJECTION, SOLUTION SUBCUTANEOUS ONCE
Qty: 0 | Refills: 0 | Status: DISCONTINUED | OUTPATIENT
Start: 2019-01-25 | End: 2019-01-25

## 2019-01-25 RX ORDER — ATORVASTATIN CALCIUM 80 MG/1
40 TABLET, FILM COATED ORAL AT BEDTIME
Qty: 0 | Refills: 0 | Status: DISCONTINUED | OUTPATIENT
Start: 2019-01-25 | End: 2019-01-25

## 2019-01-25 RX ORDER — LOSARTAN POTASSIUM 100 MG/1
100 TABLET, FILM COATED ORAL DAILY
Qty: 0 | Refills: 0 | Status: DISCONTINUED | OUTPATIENT
Start: 2019-01-25 | End: 2019-01-25

## 2019-01-25 RX ORDER — LEVOTHYROXINE SODIUM 125 MCG
75 TABLET ORAL DAILY
Qty: 0 | Refills: 0 | Status: DISCONTINUED | OUTPATIENT
Start: 2019-01-25 | End: 2019-01-25

## 2019-01-25 RX ORDER — IPRATROPIUM/ALBUTEROL SULFATE 18-103MCG
3 AEROSOL WITH ADAPTER (GRAM) INHALATION
Qty: 90 | Refills: 0
Start: 2019-01-25 | End: 2019-02-23

## 2019-01-25 RX ADMIN — HEPARIN SODIUM 5000 UNIT(S): 5000 INJECTION INTRAVENOUS; SUBCUTANEOUS at 05:46

## 2019-01-25 RX ADMIN — Medication 80 MILLIGRAM(S): at 05:46

## 2019-01-25 RX ADMIN — Medication 150 MILLIGRAM(S): at 05:48

## 2019-01-25 RX ADMIN — Medication 40 MILLIGRAM(S): at 05:46

## 2019-01-25 RX ADMIN — SODIUM POLYSTYRENE SULFONATE 30 GRAM(S): 4.1 POWDER, FOR SUSPENSION ORAL at 09:02

## 2019-01-25 RX ADMIN — Medication 0.3 MILLIGRAM(S): at 11:56

## 2019-01-25 RX ADMIN — Medication 4: at 17:53

## 2019-01-25 RX ADMIN — Medication 2: at 13:06

## 2019-01-25 RX ADMIN — Medication 150 MILLIGRAM(S): at 17:14

## 2019-01-25 RX ADMIN — HEPARIN SODIUM 5000 UNIT(S): 5000 INJECTION INTRAVENOUS; SUBCUTANEOUS at 13:40

## 2019-01-25 RX ADMIN — Medication 75 MICROGRAM(S): at 05:45

## 2019-01-25 RX ADMIN — Medication 3 MILLILITER(S): at 10:49

## 2019-01-25 RX ADMIN — Medication 40 MILLIGRAM(S): at 17:14

## 2019-01-25 RX ADMIN — Medication 81 MILLIGRAM(S): at 11:56

## 2019-01-25 RX ADMIN — DULOXETINE HYDROCHLORIDE 20 MILLIGRAM(S): 30 CAPSULE, DELAYED RELEASE ORAL at 11:57

## 2019-01-25 RX ADMIN — Medication 3 MILLILITER(S): at 17:11

## 2019-01-25 RX ADMIN — Medication 1: at 08:43

## 2019-01-25 RX ADMIN — LOSARTAN POTASSIUM 100 MILLIGRAM(S): 100 TABLET, FILM COATED ORAL at 05:45

## 2019-01-25 RX ADMIN — PANTOPRAZOLE SODIUM 40 MILLIGRAM(S): 20 TABLET, DELAYED RELEASE ORAL at 07:25

## 2019-01-25 RX ADMIN — Medication 1000 UNIT(S): at 11:56

## 2019-01-25 NOTE — DISCHARGE NOTE ADULT - PATIENT PORTAL LINK FT
You can access the [facility] Portal, offered by [facility], by registering with the following website: [email or web address]

## 2019-01-25 NOTE — H&P ADULT - ATTENDING COMMENTS
Patient seen and examined with house-staff during bedside rounds.  Resident note read, including vitals, physical findings, laboratory data, and radiological reports.   Revisions included below.  Direct personal management at bed side and extensive interpretation of the data.  Plan was outlined and discussed in details with the housestaff.  Decision making of high complexity  Action taken for acute disease activity to reflect the level of care provided:  - medication reconciliation  - review laboratory data  The patient was seen in the emergency room was not examined. The chest x-ray revealed possible left lower lobe infiltrate. The clinical picture is not consistent with left lower lobe pneumonia. The urine culture is positive for gram-negative rods. Patient improve on the antibiotic. Patient has tremor this morning and a larger she having seizure but one hour EEG was conducted and will followup on the results. The EKG was normal. I discussed the case with the family and the patient was clinically stable to be discharged on oral antibiotic and she will followup as outpatient

## 2019-01-25 NOTE — H&P ADULT - PROBLEM SELECTOR PLAN 2
Possible LLL infiltrates. RVP negative  - Treat CAP with Ceftriaxone and Azithromycin  - f/u blood cx  - obtain sputum cx

## 2019-01-25 NOTE — H&P ADULT - PROBLEM SELECTOR PLAN 8
HSQ    FULL CODE    1) PCP Contacted on Admission: (Y/N) --> [place] & Phone #: Dr. Shelley?  2) Date of Contact with PCP:  3) PCP Contacted at Discharge: (Y/N, N/A)  4) Summary of Handoff Given to PCP:   5) Post-Discharge Appointment Date and Location:

## 2019-01-25 NOTE — H&P ADULT - PROBLEM SELECTOR PLAN 4
- On Valsartan 320mg, due to shortage, will use Losartan 100mg daily  - continue home Lasix (needs to verify dosage)

## 2019-01-25 NOTE — H&P ADULT - PROBLEM SELECTOR PLAN 1
Afebrile, WBC wnl. UA positive, moderate epithelial cells but per HHA urine was foul smelling.   - Ceftriaxone 1g q24hrs  - f/u urine and blood cx

## 2019-01-25 NOTE — DISCHARGE NOTE ADULT - PLAN OF CARE
Treatment with antibiotics You came to the hospital with cough and foul smelling urine. You were found to have pneumonia and urinary infection. You received antibiotics for your infection as well as steroids for your wheezing. You will be discharged on 4 more days of antibiotics. Please take zithromax for the next 4 days. Please take cefpodoxime for the next 4 days. Please follow up with Dr. Sevilla.

## 2019-01-25 NOTE — DISCHARGE NOTE ADULT - CARE PROVIDER_API CALL
Clover Sevilla), Critical Care Medicine; Pulmonary Disease  [address] [place] [address]  Phone: [phone]  Fax: [phone]

## 2019-01-25 NOTE — H&P ADULT - NSHPREVIEWOFSYSTEMS_GEN_ALL_CORE
REVIEW OF SYSTEMS:    CONSTITUTIONAL: No weakness, fevers or chills  EYES/ENT: No visual changes;  No vertigo or throat pain   NECK: No pain or stiffness  RESPIRATORY: Productive cough and wheezing. No hemoptysis  CARDIOVASCULAR: No chest pain or palpitations  GASTROINTESTINAL: No abdominal or epigastric pain. No nausea, vomiting, or hematemesis; No diarrhea or constipation. No melena or hematochezia.  GENITOURINARY: Foul smelling urine, no dysuria or hematuria  NEUROLOGICAL: No numbness or weakness  SKIN: No itching, burning, rashes, or lesions   MSK: no joint pain, no joint swelling  All other review of systems is negative unless indicated above.

## 2019-01-25 NOTE — DISCHARGE NOTE ADULT - HOSPITAL COURSE
89 yo F with HTN, HLD, CKD, hypothyroid reactive airway disease, depression, gout presents with productive cough and left ear pain x 2 days and foul smelling urine x 2-3 days. Pt developed productive cough with whitish sputum with subjective fevers and chills. She also complained of sore throat and hoarseness with left ear pain. She denied ringing or discharge from the ears, nausea, vomiting, chest pain, or palpitations. Her HHA also notices foul smelling and cloudy urine over the past 3 days. Pt denies dysuria or hematuria. Pt recently returned from a trip to [place], and she was recently in the ED after bumping her leg, got a LE duplex which showed Baker's cyst but no DVT. Patient was found to have positive ua and evidence of possible consolidation on cxr. She was started on cef/azithro as well as prednisone. In the AM, wheezing improved and patient was subjectively better. She is now hemodynamically stable for discharge on 4 more days of cefpodoxime and zithromax.

## 2019-01-25 NOTE — PATIENT PROFILE ADULT - NSPROEDALEARNPREF_GEN_A_NUR
computer/internet/audio/individual instruction/written material/group instruction/skill demonstration

## 2019-01-25 NOTE — DISCHARGE NOTE ADULT - CARE PLAN
Principal Discharge DX:	Pneumonia due to infectious organism, unspecified laterality, unspecified part of lung  Goal:	Treatment with antibiotics  Assessment and plan of treatment:	You came to the hospital with cough and foul smelling urine. You were found to have pneumonia and urinary infection. You received antibiotics for your infection as well as steroids for your wheezing. You will be discharged on 4 more days of antibiotics. Please take zithromax for the next 4 days. Please take cefpodoxime for the next 4 days. Please follow up with Dr. Sevilla.

## 2019-01-25 NOTE — H&P ADULT - HISTORY OF PRESENT ILLNESS
88 yo F with HTN, HLD, CKD, hypothyroid reactive airway disease, depression, gout presents with productive cough and left ear pain x 2 days and foul smelling urine x 2-3 days. Pt developed productive cough with whitish sputum with subjective fevers and chills. She also complains of sore throat and hoarseness with left ear pain. She denies ringing or discharge from the ears, nausea, vomiting, chest pain, or palpitations. Her HHA also notices foul smelling and cloudy urine over the past 3 days. Pt denies dysuria or hematuria. Pt recently returned from a trip to [place], and she was recently in the ED after bumping her leg, got a LE duplex which showed Baker's cyst but no DVT.

## 2019-01-25 NOTE — H&P ADULT - PROBLEM SELECTOR PLAN 3
Per ED staff, family states that pt's baseline hypoxia 89-91 w/o home O2. Known reactive airway disease but unclear if diagnosed with COPD or asthma. Pt is wheezy on exam, SpO2 87% on RA. S/p Solumedrol 125mg in ED  - continue with Solumedrol 40mg q12hrs  - Duonebs   - continue O2 therapy

## 2019-01-25 NOTE — ED ADULT NURSE REASSESSMENT NOTE - NS ED NURSE REASSESS COMMENT FT1
Pt reports pain w/ IV insertion. Pt was hard stick. Pt admission MD contacted regarding IV to be removed as per pt requests. Pt admission MD reports to keep IV inserted due to antibiotics to be given. Hot  packs offered and accepted to keep pain at minimum. Will continue to monitor and attempt alternative comfort measures.

## 2019-01-25 NOTE — DISCHARGE NOTE ADULT - MEDICATION SUMMARY - MEDICATIONS TO TAKE
I will START or STAY ON the medications listed below when I get home from the hospital:    nebulizer machine  -- as directed  -- Indication: For wheezing    aspirin 81 mg oral delayed release capsule  -- 1 tab(s) by mouth   -- Indication: For CKD (chronic kidney disease)    Diovan 320 mg oral tablet  -- 1 tab(s) by mouth once a day  -- Indication: For Htn    valsartan 320 mg oral tablet  -- 1 tab(s) by mouth once a day  -- Indication: For Htn    Lyrica 150 mg oral capsule  -- 1 cap(s) by mouth 2 times a day  -- Indication: For Pain    desipramine 10 mg oral tablet  -- 1 tab(s) by mouth 3 times a day  -- Indication: For Prophylactic measure    DULoxetine 20 mg oral delayed release capsule  -- 1 cap(s) by mouth 2 times a day  -- Indication: For Prophylactic measure    desipramine 10 mg oral tablet  -- 1 tab(s) by mouth once a day  -- Indication: For Prophylactic measure    colchicine 0.6 mg oral tablet  -- 1 tab(s) by mouth once a day  -- Indication: For Prophylactic measure    rosuvastatin 10 mg oral tablet  -- 1 tab(s) by mouth once a day (at bedtime)  -- Indication: For Prophylactic measure    Uloric 80 mg oral tablet  -- 1 tab(s) by mouth once a day  -- Indication: For Prophylactic measure    albuterol 2.5 mg/3 mL (0.083%) inhalation solution  -- 3 milliliter(s) inhaled every 6 hours  -- For inhalation only.  It is very important that you take or use this exactly as directed.  Do not skip doses or discontinue unless directed by your doctor.  Obtain medical advice before taking any non-prescription drugs as some may affect the action of this medication.    -- Indication: For asthma    Proventil HFA 90 mcg/inh inhalation aerosol  -- 2 puff(s) inhaled 4 times a day  -- For inhalation only.  It is very important that you take or use this exactly as directed.  Do not skip doses or discontinue unless directed by your doctor.  Obtain medical advice before taking any non-prescription drugs as some may affect the action of this medication.  Shake well before use.    -- Indication: For asthma    ipratropium-albuterol 0.5 mg-2.5 mg/3 mLinhalation solution  -- 3 milliliter(s) by nebulizer 2 times a day   -- For inhalation only.  It is very important that you take or use this exactly as directed.  Do not skip doses or discontinue unless directed by your doctor.  Obtain medical advice before taking any non-prescription drugs as some may affect the action of this medication.    -- Indication: For asthma    cefpodoxime 200 mg oral tablet  -- 1 tab(s) by mouth every 12 hours   -- Finish all this medication unless otherwise directed by prescriber.  Take with food or milk.    -- Indication: For Pneumonia due to infectious organism, unspecified laterality, unspecified part of lung    Lasix 80 mg oral tablet  -- 1 tab(s) by mouth 2 times a day  -- Indication: For CKD (chronic kidney disease)    azithromycin 250 mg oral tablet  -- 1 tab(s) by mouth once a day   -- Do not take dairy products, antacids, or iron preparations within one hour of this medication.  Finish all this medication unless otherwise directed by prescriber.    -- Indication: For Pneumonia due to infectious organism, unspecified laterality, unspecified part of lung    omeprazole 40 mg oral delayed release capsule  -- 1 cap(s) by mouth once a day  -- Indication: For gerd    Premarin 1.25 mg oral tablet  -- 1 tab(s) by mouth once a day  -- Indication: For Prophylactic measure    levothyroxine 75 mcg (0.075 mg) oral capsule  -- 1 cap(s) by mouth once a day  -- Indication: For Hypothyroidism    Vitamin D3 5000 intl units oral tablet  -- 1 tab(s) by mouth once a day  -- Indication: For Prophylactic measure

## 2019-01-25 NOTE — H&P ADULT - NSHPLABSRESULTS_GEN_ALL_CORE
.  LABS:                         11.1   8.4   )-----------( 195      ( 2019 20:52 )             36.9         137  |  100  |  48<H>  ----------------------------<  103<H>  see note   |  25  |  2.78<H>    Ca    9.6      2019 20:52  Mg     2.5             Urinalysis Basic - ( 2019 20:52 )    Color: Yellow / Appearance: SL Cloudy / S.010 / pH: x  Gluc: x / Ketone: NEGATIVE  / Bili: Negative / Urobili: 0.2 E.U./dL   Blood: x / Protein: NEGATIVE mg/dL / Nitrite: POSITIVE   Leuk Esterase: Moderate / RBC: > 10 /HPF / WBC Many /HPF   Sq Epi: x / Non Sq Epi: Moderate /HPF / Bacteria: Present /HPF          Serum Pro-Brain Natriuretic Peptide: 2554 pg/mL ( @ 20:52)        RADIOLOGY, EKG & ADDITIONAL TESTS: Reviewed.   CXR: LLL infiltrates

## 2019-01-25 NOTE — H&P ADULT - NSHPPHYSICALEXAM_GEN_ALL_CORE
.  VITAL SIGNS:  T(C): 36.9 (01-25-19 @ 02:36), Max: 37.2 (01-24-19 @ 21:49)  T(F): 98.4 (01-25-19 @ 02:36), Max: 99 (01-24-19 @ 21:49)  HR: 88 (01-25-19 @ 02:36) (79 - 88)  BP: 119/70 (01-25-19 @ 02:36) (119/70 - 136/71)  BP(mean): --  RR: 18 (01-25-19 @ 02:36) (16 - 19)  SpO2: 94% (01-25-19 @ 02:36) (89% - 97%)  Wt(kg): --    PHYSICAL EXAM:    Constitutional: WDWN resting comfortably in bed; NAD  Head: NC/AT  Eyes: PERRLA, EOMI, clear conjunctiva  ENT: no nasal discharge; no oropharyngeal erythema or exudates; dry oral mucosa. Left ear canal with non-impacted cerumen, no erythema or discharge. TM visible with +light reflex  Neck: supple; no JVD or thyromegaly  Respiratory: CTA B/L; wheezing in upper lung fields b/l, no rhonchi or crackles, no retractions  Cardiac: +S1/S2; RRR; 3/6 systolic murmur best heard at RUSB radiating to carotids; PMI non-displaced  Gastrointestinal: soft, NT/ND; no rebound or guarding; +BS, no hepatosplenomegaly  Extremities: WWP, no clubbing or cyanosis; nonpitting edema in LE b/l  Vascular: 2+ radial, DP/PT pulses B/L  Lymphatic: no submandibular or cervical LAD  Neurologic: AAOx3; answers questions appropriately, follows commands, moves all extremities

## 2019-01-25 NOTE — H&P ADULT - ASSESSMENT
88 yo F with HTN, HLD, CKD, hypothyroid reactive airway disease, depression, gout presents with productive cough and left ear pain x 2 days and foul smelling urine x 2-3 days.

## 2019-01-26 DIAGNOSIS — M71.22 SYNOVIAL CYST OF POPLITEAL SPACE [BAKER], LEFT KNEE: ICD-10-CM

## 2019-01-26 DIAGNOSIS — Z90.49 ACQUIRED ABSENCE OF OTHER SPECIFIED PARTS OF DIGESTIVE TRACT: ICD-10-CM

## 2019-01-26 DIAGNOSIS — Z79.82 LONG TERM (CURRENT) USE OF ASPIRIN: ICD-10-CM
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
## 2019-01-26 DIAGNOSIS — N18.4 CHRONIC KIDNEY DISEASE, STAGE 4 (SEVERE): ICD-10-CM

## 2019-01-26 DIAGNOSIS — I12.9 HYPERTENSIVE CHRONIC KIDNEY DISEASE WITH STAGE 1 THROUGH STAGE 4 CHRONIC KIDNEY DISEASE, OR UNSPECIFIED CHRONIC KIDNEY DISEASE: ICD-10-CM

## 2019-01-26 DIAGNOSIS — Z79.52 LONG TERM (CURRENT) USE OF SYSTEMIC STEROIDS: ICD-10-CM

## 2019-01-26 DIAGNOSIS — M25.561 PAIN IN RIGHT KNEE: ICD-10-CM

## 2019-01-26 DIAGNOSIS — S81.812A LACERATION WITHOUT FOREIGN BODY, LEFT LOWER LEG, INITIAL ENCOUNTER: ICD-10-CM
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
## 2019-01-26 LAB
-  AMIKACIN: SIGNIFICANT CHANGE UP
-  AMPICILLIN/SULBACTAM: SIGNIFICANT CHANGE UP
-  AMPICILLIN: SIGNIFICANT CHANGE UP
-  CEFTRIAXONE: SIGNIFICANT CHANGE UP
-  CIPROFLOXACIN: SIGNIFICANT CHANGE UP
-  GENTAMICIN: SIGNIFICANT CHANGE UP
-  NITROFURANTOIN: SIGNIFICANT CHANGE UP
-  PIPERACILLIN/TAZOBACTAM: SIGNIFICANT CHANGE UP
-  TOBRAMYCIN: SIGNIFICANT CHANGE UP
-  TRIMETHOPRIM/SULFAMETHOXAZOLE: SIGNIFICANT CHANGE UP
CULTURE RESULTS: SIGNIFICANT CHANGE UP
METHOD TYPE: SIGNIFICANT CHANGE UP
ORGANISM # SPEC MICROSCOPIC CNT: SIGNIFICANT CHANGE UP
ORGANISM # SPEC MICROSCOPIC CNT: SIGNIFICANT CHANGE UP
SPECIMEN SOURCE: SIGNIFICANT CHANGE UP

## 2019-01-30 DIAGNOSIS — N18.4 CHRONIC KIDNEY DISEASE, STAGE 4 (SEVERE): ICD-10-CM

## 2019-01-30 DIAGNOSIS — M10.9 GOUT, UNSPECIFIED: ICD-10-CM

## 2019-01-30 DIAGNOSIS — E03.9 HYPOTHYROIDISM, UNSPECIFIED: ICD-10-CM

## 2019-01-30 DIAGNOSIS — J18.9 PNEUMONIA, UNSPECIFIED ORGANISM: ICD-10-CM

## 2019-01-30 DIAGNOSIS — N39.0 URINARY TRACT INFECTION, SITE NOT SPECIFIED: ICD-10-CM

## 2019-01-30 DIAGNOSIS — R33.9 RETENTION OF URINE, UNSPECIFIED: ICD-10-CM

## 2019-01-30 DIAGNOSIS — F32.9 MAJOR DEPRESSIVE DISORDER, SINGLE EPISODE, UNSPECIFIED: ICD-10-CM

## 2019-01-30 DIAGNOSIS — I12.9 HYPERTENSIVE CHRONIC KIDNEY DISEASE WITH STAGE 1 THROUGH STAGE 4 CHRONIC KIDNEY DISEASE, OR UNSPECIFIED CHRONIC KIDNEY DISEASE: ICD-10-CM

## 2019-03-18 NOTE — ED ADULT NURSE NOTE - VISUAL DISTURBANCES
Pt arrived to unit via gurney. Ambulated from gurney to bed, sba assist. Tele monitor applied, vitals taken. Pt assessed. A&O x4. Admit profile and med rec complete. Discussed POC with pt, including o2 therapy, safety, diet, admission, orientation to room/unit, medications, Droplet Precautions for Flu. Welcome folder provided and discussed. Communication board filled out. Questions and concerns addressed, verbalized understanding. Fall precautions in place. Pt demonstrates ability to use call light appropriately. Pt left in lowest position. Bed locked and low, bed alarm on.      no

## 2020-05-22 ENCOUNTER — APPOINTMENT (OUTPATIENT)
Dept: VASCULAR SURGERY | Facility: CLINIC | Age: 85
End: 2020-05-22
Payer: MEDICARE

## 2020-05-22 PROCEDURE — 93923 UPR/LXTR ART STDY 3+ LVLS: CPT

## 2020-05-22 PROCEDURE — 99204 OFFICE O/P NEW MOD 45 MIN: CPT

## 2020-06-01 NOTE — ASSESSMENT
[FreeTextEntry1] : 89 y/o F w/ R heel pressure wound with necrotic cap and no signs of infection. On exam and confirmed with POP/PVR, pt with adequate perfusions to heel site. Pt,  and home health aide advised to relieve pressure off site using CAM boot and pillows. Site to be cleansed with peroxide and betadine daily. Surrounding skin to be moisturized well. Pt to call with any questions or concerns. No need for antibiotic at this point.

## 2020-06-01 NOTE — HISTORY OF PRESENT ILLNESS
[FreeTextEntry1] : 89 y/o F w. PMH of HTN, HLD, COPD, kidney disease, and remote hx of sepsis affecting her functional status. She is wheelchair bound and presents with home health aide plus , Dr Mi who is a well known colleague of mine. She states that in January, they noticed she had a small crack in the right heel and a wound appeared. Since then, they have been treating the wound daily with betadine and applying triple antibiotic ointment. The wound has become a lot larger and now with a "black covering". Denies any other wounds or rest pain but does endorse some leg swelling. She had been on antibiotics about a month ago prescribed by her PCP but is no longer on them. \par \par Pt seen during COVID19 pandemic, PPE and precautions taken during visit. Pre-appt day, pt denies any contact with known +COVID people/family, recent hospitalization, SOB, flu-like symptoms, N/V/D, fever or chills.

## 2020-06-01 NOTE — PROCEDURE
[FreeTextEntry1] : POP/PVR :\par R 1.21\par L 1.28\par Adequate PVR waveforms throughout, slightly dampened at the level of the metatarsals.

## 2020-06-01 NOTE — PHYSICAL EXAM
[Respiratory Effort] : normal respiratory effort [Normal Rate and Rhythm] : normal rate and rhythm [Ankle Swelling (On Exam)] : present [Ankle Swelling Bilaterally] : bilaterally  [Ankle Swelling On The Left] : moderate [Varicose Veins Of Lower Extremities] : bilaterally [Ankle Swelling On The Right] : mild [Alert] : alert [Oriented to Person] : oriented to person [Oriented to Place] : oriented to place [Oriented to Time] : oriented to time [Calm] : calm [JVD] : no jugular venous distention  [2+] : left 2+ [] : not present [Abdomen Tenderness] : ~T ~M No abdominal tenderness [de-identified] : calm, cooperative, sitting in wheelchair [de-identified] : FROM but slightly limited throughout and arthritic joints  [FreeTextEntry1] : BLEs with mild to moderate swelling, particularly below the knees down to ankles. Non bulging varicose veins throughout. Weak, lightly palpable R PT pulses. Unable to palpable other pedal pulses. Adequate capillary refill throughout. R heel necrotic cap, no surrounding erythema, no odor, scant serosang drainage. Otherwise skin intact.  [de-identified] : see cardiovascular section

## 2020-06-24 ENCOUNTER — APPOINTMENT (OUTPATIENT)
Dept: VASCULAR SURGERY | Facility: CLINIC | Age: 85
End: 2020-06-24
Payer: MEDICARE

## 2020-06-24 PROCEDURE — 99214 OFFICE O/P EST MOD 30 MIN: CPT | Mod: 25

## 2020-06-24 PROCEDURE — 97597 DBRDMT OPN WND 1ST 20 CM/<: CPT

## 2020-06-29 NOTE — ASSESSMENT
[FreeTextEntry1] : 89 y/o F w/ R heel pressure wound with necrotic cap, now moist and with slight odor; no erythema, drainage, chills or fevers. Heal wound debrided, cleansed with peroxide/betadine. Packed with peroxide moist kerlix and wrapped. Pt and home health aide advised to continue this at home. Also, to continue to relieve pressure off site using CAM boot and pillows. Pt to call with any questions or concerns. No need for antibiotic at this point. F/u in 2 weeks or sooner if needed.

## 2020-06-29 NOTE — PHYSICAL EXAM
[Respiratory Effort] : normal respiratory effort [Normal Rate and Rhythm] : normal rate and rhythm [2+] : left 2+ [Ankle Swelling (On Exam)] : present [Ankle Swelling Bilaterally] : bilaterally  [Ankle Swelling On The Left] : moderate [Varicose Veins Of Lower Extremities] : bilaterally [Ankle Swelling On The Right] : mild [Alert] : alert [Oriented to Person] : oriented to person [Oriented to Time] : oriented to time [Oriented to Place] : oriented to place [Calm] : calm [JVD] : no jugular venous distention  [] : not present [Abdomen Tenderness] : ~T ~M No abdominal tenderness [de-identified] : FROM but slightly limited throughout and arthritic joints  [FreeTextEntry1] : BLEs with mild to moderate swelling, particularly below the knees down to ankles. Non bulging varicose veins throughout. Weak, lightly palpable R PT pulses. Unable to palpable other pedal pulses. Adequate capillary refill throughout. R heel necrotic cap soft, with no surrounding erythema, no drainage however, slight foul odor appreciated. [de-identified] : calm, cooperative, sitting in wheelchair [de-identified] : see cardiovascular section

## 2020-06-29 NOTE — HISTORY OF PRESENT ILLNESS
[FreeTextEntry1] : 89 y/o F w/ PMH of HTN, HLD, COPD, kidney disease, remote hx of sepsis affecting her functional status, and was evaluated a few weeks ago for a R heel pressure wound with necrotic cap, no signs of infection. Her aide has been cleaning the wound as instructed with betadine/peroxide every day and they have been offloading the area with the boot. However, they noticed it started to have a foul odor and it became soft. Denies any drainage, fever, chills. \par \par She is wheelchair bound and presents with home health aide. Her  stayed home today, Dr Mi who is a well known colleague of mine.

## 2020-07-07 VITALS
WEIGHT: 141.98 LBS | SYSTOLIC BLOOD PRESSURE: 82 MMHG | HEIGHT: 62 IN | HEART RATE: 73 BPM | OXYGEN SATURATION: 92 % | RESPIRATION RATE: 18 BRPM | TEMPERATURE: 98 F | DIASTOLIC BLOOD PRESSURE: 46 MMHG

## 2020-07-07 LAB
ALBUMIN SERPL ELPH-MCNC: 3.3 G/DL — SIGNIFICANT CHANGE UP (ref 3.3–5)
ALP SERPL-CCNC: 113 U/L — SIGNIFICANT CHANGE UP (ref 40–120)
ALT FLD-CCNC: 19 U/L — SIGNIFICANT CHANGE UP (ref 10–45)
ANION GAP SERPL CALC-SCNC: 14 MMOL/L — SIGNIFICANT CHANGE UP (ref 5–17)
APPEARANCE UR: ABNORMAL
APTT BLD: 32.5 SEC — SIGNIFICANT CHANGE UP (ref 27.5–35.5)
AST SERPL-CCNC: 34 U/L — SIGNIFICANT CHANGE UP (ref 10–40)
BACTERIA # UR AUTO: PRESENT /HPF
BASE EXCESS BLDV CALC-SCNC: 5 MMOL/L — SIGNIFICANT CHANGE UP
BASOPHILS # BLD AUTO: 0.03 K/UL — SIGNIFICANT CHANGE UP (ref 0–0.2)
BASOPHILS NFR BLD AUTO: 0.4 % — SIGNIFICANT CHANGE UP (ref 0–2)
BILIRUB SERPL-MCNC: 0.3 MG/DL — SIGNIFICANT CHANGE UP (ref 0.2–1.2)
BILIRUB UR-MCNC: NEGATIVE — SIGNIFICANT CHANGE UP
BLD GP AB SCN SERPL QL: NEGATIVE — SIGNIFICANT CHANGE UP
BUN SERPL-MCNC: 51 MG/DL — HIGH (ref 7–23)
CA-I SERPL-SCNC: 1.07 MMOL/L — LOW (ref 1.12–1.3)
CALCIUM SERPL-MCNC: 9.1 MG/DL — SIGNIFICANT CHANGE UP (ref 8.4–10.5)
CHLORIDE SERPL-SCNC: 91 MMOL/L — LOW (ref 96–108)
CO2 SERPL-SCNC: 29 MMOL/L — SIGNIFICANT CHANGE UP (ref 22–31)
COLOR SPEC: YELLOW — SIGNIFICANT CHANGE UP
COMMENT - URINE: SIGNIFICANT CHANGE UP
CREAT SERPL-MCNC: 4.84 MG/DL — HIGH (ref 0.5–1.3)
DIFF PNL FLD: ABNORMAL
EOSINOPHIL # BLD AUTO: 0.03 K/UL — SIGNIFICANT CHANGE UP (ref 0–0.5)
EOSINOPHIL NFR BLD AUTO: 0.4 % — SIGNIFICANT CHANGE UP (ref 0–6)
EPI CELLS # UR: SIGNIFICANT CHANGE UP /HPF (ref 0–5)
GAS PNL BLDV: 130 MMOL/L — LOW (ref 138–146)
GAS PNL BLDV: SIGNIFICANT CHANGE UP
GLUCOSE SERPL-MCNC: 129 MG/DL — HIGH (ref 70–99)
GLUCOSE UR QL: NEGATIVE — SIGNIFICANT CHANGE UP
HCO3 BLDV-SCNC: 32 MMOL/L — HIGH (ref 20–27)
HCT VFR BLD CALC: 36 % — SIGNIFICANT CHANGE UP (ref 34.5–45)
HGB BLD-MCNC: 10.9 G/DL — LOW (ref 11.5–15.5)
IMM GRANULOCYTES NFR BLD AUTO: 0.6 % — SIGNIFICANT CHANGE UP (ref 0–1.5)
INR BLD: 0.97 — SIGNIFICANT CHANGE UP (ref 0.88–1.16)
KETONES UR-MCNC: NEGATIVE — SIGNIFICANT CHANGE UP
LACTATE SERPL-SCNC: 2 MMOL/L — SIGNIFICANT CHANGE UP (ref 0.5–2)
LEUKOCYTE ESTERASE UR-ACNC: ABNORMAL
LYMPHOCYTES # BLD AUTO: 2.29 K/UL — SIGNIFICANT CHANGE UP (ref 1–3.3)
LYMPHOCYTES # BLD AUTO: 27.7 % — SIGNIFICANT CHANGE UP (ref 13–44)
MAGNESIUM SERPL-MCNC: 2.1 MG/DL — SIGNIFICANT CHANGE UP (ref 1.6–2.6)
MCHC RBC-ENTMCNC: 30.3 GM/DL — LOW (ref 32–36)
MCHC RBC-ENTMCNC: 30.4 PG — SIGNIFICANT CHANGE UP (ref 27–34)
MCV RBC AUTO: 100.3 FL — HIGH (ref 80–100)
MONOCYTES # BLD AUTO: 0.82 K/UL — SIGNIFICANT CHANGE UP (ref 0–0.9)
MONOCYTES NFR BLD AUTO: 9.9 % — SIGNIFICANT CHANGE UP (ref 2–14)
NEUTROPHILS # BLD AUTO: 5.04 K/UL — SIGNIFICANT CHANGE UP (ref 1.8–7.4)
NEUTROPHILS NFR BLD AUTO: 61 % — SIGNIFICANT CHANGE UP (ref 43–77)
NITRITE UR-MCNC: POSITIVE
NRBC # BLD: 0 /100 WBCS — SIGNIFICANT CHANGE UP (ref 0–0)
NT-PROBNP SERPL-SCNC: 754 PG/ML — HIGH (ref 0–300)
PCO2 BLDV: 60 MMHG — HIGH (ref 41–51)
PH BLDV: 7.34 — SIGNIFICANT CHANGE UP (ref 7.32–7.43)
PH UR: 7 — SIGNIFICANT CHANGE UP (ref 5–8)
PLATELET # BLD AUTO: 291 K/UL — SIGNIFICANT CHANGE UP (ref 150–400)
PO2 BLDV: 22 MMHG — SIGNIFICANT CHANGE UP
POTASSIUM BLDV-SCNC: 4.2 MMOL/L — SIGNIFICANT CHANGE UP (ref 3.5–4.9)
POTASSIUM SERPL-MCNC: 4.4 MMOL/L — SIGNIFICANT CHANGE UP (ref 3.5–5.3)
POTASSIUM SERPL-SCNC: 4.4 MMOL/L — SIGNIFICANT CHANGE UP (ref 3.5–5.3)
PROT SERPL-MCNC: 6.5 G/DL — SIGNIFICANT CHANGE UP (ref 6–8.3)
PROT UR-MCNC: 30 MG/DL
PROTHROM AB SERPL-ACNC: 11.6 SEC — SIGNIFICANT CHANGE UP (ref 10.6–13.6)
RBC # BLD: 3.59 M/UL — LOW (ref 3.8–5.2)
RBC # FLD: 14.4 % — SIGNIFICANT CHANGE UP (ref 10.3–14.5)
RBC CASTS # UR COMP ASSIST: < 5 /HPF — SIGNIFICANT CHANGE UP
RH IG SCN BLD-IMP: POSITIVE — SIGNIFICANT CHANGE UP
RH IG SCN BLD-IMP: POSITIVE — SIGNIFICANT CHANGE UP
SAO2 % BLDV: 30 % — SIGNIFICANT CHANGE UP
SODIUM SERPL-SCNC: 134 MMOL/L — LOW (ref 135–145)
SP GR SPEC: 1.01 — SIGNIFICANT CHANGE UP (ref 1–1.03)
TROPONIN T SERPL-MCNC: 0.03 NG/ML — HIGH (ref 0–0.01)
UROBILINOGEN FLD QL: 0.2 E.U./DL — SIGNIFICANT CHANGE UP
WBC # BLD: 8.26 K/UL — SIGNIFICANT CHANGE UP (ref 3.8–10.5)
WBC # FLD AUTO: 8.26 K/UL — SIGNIFICANT CHANGE UP (ref 3.8–10.5)
WBC UR QL: < 5 /HPF — SIGNIFICANT CHANGE UP

## 2020-07-07 PROCEDURE — 73620 X-RAY EXAM OF FOOT: CPT | Mod: 26,RT

## 2020-07-07 PROCEDURE — 71045 X-RAY EXAM CHEST 1 VIEW: CPT | Mod: 26

## 2020-07-07 RX ORDER — PIPERACILLIN AND TAZOBACTAM 4; .5 G/20ML; G/20ML
3.38 INJECTION, POWDER, LYOPHILIZED, FOR SOLUTION INTRAVENOUS ONCE
Refills: 0 | Status: COMPLETED | OUTPATIENT
Start: 2020-07-07 | End: 2020-07-07

## 2020-07-07 RX ORDER — PANTOPRAZOLE SODIUM 20 MG/1
80 TABLET, DELAYED RELEASE ORAL ONCE
Refills: 0 | Status: COMPLETED | OUTPATIENT
Start: 2020-07-07 | End: 2020-07-07

## 2020-07-07 RX ORDER — VANCOMYCIN HCL 1 G
1000 VIAL (EA) INTRAVENOUS ONCE
Refills: 0 | Status: COMPLETED | OUTPATIENT
Start: 2020-07-07 | End: 2020-07-08

## 2020-07-07 RX ORDER — SODIUM CHLORIDE 9 MG/ML
1000 INJECTION INTRAMUSCULAR; INTRAVENOUS; SUBCUTANEOUS
Refills: 0 | Status: DISCONTINUED | OUTPATIENT
Start: 2020-07-07 | End: 2020-07-08

## 2020-07-07 RX ORDER — SODIUM CHLORIDE 9 MG/ML
500 INJECTION INTRAMUSCULAR; INTRAVENOUS; SUBCUTANEOUS ONCE
Refills: 0 | Status: COMPLETED | OUTPATIENT
Start: 2020-07-07 | End: 2020-07-07

## 2020-07-07 RX ADMIN — PANTOPRAZOLE SODIUM 80 MILLIGRAM(S): 20 TABLET, DELAYED RELEASE ORAL at 21:30

## 2020-07-07 RX ADMIN — SODIUM CHLORIDE 1000 MILLILITER(S): 9 INJECTION INTRAMUSCULAR; INTRAVENOUS; SUBCUTANEOUS at 21:41

## 2020-07-07 RX ADMIN — SODIUM CHLORIDE 1000 MILLILITER(S): 9 INJECTION INTRAMUSCULAR; INTRAVENOUS; SUBCUTANEOUS at 22:34

## 2020-07-07 RX ADMIN — SODIUM CHLORIDE 500 MILLILITER(S): 9 INJECTION INTRAMUSCULAR; INTRAVENOUS; SUBCUTANEOUS at 23:11

## 2020-07-07 NOTE — ED PROVIDER NOTE - ENMT, MLM
Airway patent, Nasal mucosa clear. Mouth with normal mucosa. Throat has no vesicles, no oropharyngeal exudates and uvula is midline. Airway patent, Nasal mucosa clear. DRY MM Throat has no vesicles, no oropharyngeal exudates and uvula is midline.

## 2020-07-07 NOTE — ED ADULT NURSE NOTE - OBJECTIVE STATEMENT
pt is 89y female, BIBA from home for n/v/diarrhea syncopal episodes and low BP x1 week, per pt's caregiver pt has had dark diarrhea with numerous episodes per day and intermittent non-bloody emesis, pt has also been having syncopal episodes lasting approx 5 sec, no head injury, pt denies any abd pain on assessment, pt is awake and A&Ox3, pale appearing, skin cool and dry, normal heart and lung sounds, no leg swelling, abd soft, non-distended, non-tender, no active diarrhea or vomiting

## 2020-07-07 NOTE — ED PROVIDER NOTE - CLINICAL SUMMARY MEDICAL DECISION MAKING FREE TEXT BOX
hypotension, fatigue weakness- R heel ulcer- debrided 10 days ago- clean borders, foul odor- know to vascular- they will eval  Cr 2.4-4.5- Nephro notified- bp improved after 1 L- place calvin, MICU consult hypotension, fatigue weakness- R heel ulcer- debrided 10 days ago- clean borders, foul odor- know to vascular- they will eval  Cr 2.4-4.5- Nephro notified- bp improved after 1 L- place simpson, MICU consult  stool- dark, but non tarry- c/w pepto- hha confirms px taking pepto

## 2020-07-07 NOTE — ED PROVIDER NOTE - PROGRESS NOTE DETAILS
Director - pt received from Dr Carrillo.  Pt resting comfortably, icu and vasc at bedside.  VS improving.  Labs, imaging reviewed.  Await icu/vasc eval and recommendations. Guru: d/w ICU; can be admitted to regional med floor, recommend maintenance IV fluids.  abx already started. [facility] will [facility], recommend wound care/debridement and cont abx.  Await 2nd trop for admit to f.

## 2020-07-07 NOTE — ED PROVIDER NOTE - DIAGNOSTIC INTERPRETATION
ER Physician: Paul Carrillo  CHEST XRAY INTERPRETATION: lungs clear, heart shadow normal, bony structures intact     ER Physician: Paul Carrillo  INTERPRETATION:  no acute fracture; no soft tissue swelling noted; normal bony alignment- ulcer noted down close to the bone

## 2020-07-07 NOTE — ED PROVIDER NOTE - CONSTITUTIONAL, MLM
normal... pale elderly female, awake, alert, oriented to person, place, time/situation and in no apparent distress.

## 2020-07-07 NOTE — CONSULT NOTE ADULT - ASSESSMENT
89yFemale w/ PMHx of HTN, HLD, CKD, hypothyroid COPD/reactive airway disease, depression, gout presents with lightheadedness, weakness, and diarrhea for the past several days found to be hypotensive with acute on chronic CKD and elevated troponin to 0.03.     #    #    #      Dispo:  Pending discuss with attending Dr. Calderon 89yFemale w/ PMHx of HTN, HLD, CKD, hypothyroid COPD/reactive airway disease, depression, gout presents with lightheadedness, weakness, and diarrhea for the past several days found to be hypotensive with acute on chronic CKD and elevated troponin to 0.03.     #hypotension   - likely 2/2 hypovolemia from diarrhea with associated, resolved with fluid administration  - would obtain orthostatics and rectal exam given history of dark stools   - s/p 1.5L NS, would start maintenance fluids, NS 100cc/hr  - continue to monitor CBC     #elevated troponin   - demand ischemia     #      Dispo: RMF   Discussed with attending Dr. Calderon 89yFemale w/ PMHx of HTN, HLD, CKD, hypothyroid COPD/reactive airway disease, depression, gout presents with lightheadedness, weakness, and diarrhea for the past several days found to be hypotensive with acute on chronic CKD and elevated troponin to 0.03.     #hypotension   - likely 2/2 hypovolemia from diarrhea with associated, resolved with fluid administration  - would obtain orthostatics and rectal exam given history of dark stools   - s/p 1.5L NS, would start maintenance fluids, NS 100cc/hr  - continue to monitor CBC     #marta on ckd   - baseline Cr 2.5, follows with Dr. Garcia   - On admission, elevated Cr to 4.84, likely pre-renal with decreased PO intake and elevated BUN, intravascularly depleted   - c/w maintenance fluids as above  - obtain urine Na and Cr   - would hold home diuretic     #diarrhea   - patient with several weeks of diarrhea, mainly after eating episodes. No recent abx use.   - as per patient, most recent colonoscopy wnl   - low suspicion for infectious etiology with lack of fever, wbc elevation  - consider loperamide use     #elevated troponin   - demand ischemia, no ischemic changes on EKG  - trend to peak    #urinary retention  - patient with chronic retention, straight caths as needed  - currently has simpson in, continue to monitor output     Dispo: RMF   Discussed with attending Dr. Calderon 89yFemale w/ PMHx of HTN, HLD, CKD, hypothyroid COPD/reactive airway disease, depression, gout presents with lightheadedness, weakness, and diarrhea for the past several days found to be hypotensive with acute on chronic CKD and elevated troponin to 0.03.     #right heel ulcer  - [id number] with pus  - Vascular sx consulted  - f/u xray foot read   - will likely need debridement and abx coverage     #hypotension   - likely 2/2 hypovolemia from diarrhea, resolved with fluid administration  - would obtain orthostatics and rectal exam given history of dark stools   - s/p 1.5L NS, would start maintenance fluids, NS 100cc/hr  - continue to monitor CBC     #marta on ckd   - baseline Cr 2.5, follows with Dr. Garcia   - On admission, elevated Cr to 4.84, likely pre-renal with decreased PO intake and elevated BUN, intravascularly depleted   - c/w maintenance fluids as above  - obtain urine Na and Cr   - would hold home diuretic     #diarrhea   - patient with several weeks of diarrhea, mainly after eating episodes. No recent abx use.   - as per patient, most recent colonoscopy wnl   - low suspicion for infectious etiology with lack of fever, wbc elevation  - consider loperamide use     #elevated troponin   - demand ischemia, no ischemic changes on EKG  - trend to peak    #urinary retention  - patient with chronic retention, straight caths as needed  - currently has simpson in, continue to monitor output     Dispo: RMF, no need for increased level of care at this time   Discussed with attending Dr. Calderon

## 2020-07-07 NOTE — ED PROVIDER NOTE - MUSCULOSKELETAL, MLM
Spine appears normal, range of motion is not limited, no muscle or joint tenderness Spine appears normal, range of motion is not limited, deep heel ulcer, down close to the bone- BL cyanotic toes w delay cap refill 5+ s BL

## 2020-07-07 NOTE — ED PROVIDER NOTE - CARDIAC, MLM
Normal rate, regular rhythm.  Heart sounds S1, S2. Normal rate, regular rhythm.  Heart sounds S1, S2. distal cyanosis to toes as below

## 2020-07-07 NOTE — ED PROVIDER NOTE - OBJECTIVE STATEMENT
89 y/o F w/ PMH of HTN, HLD, COPD, kidney disease - baseline Cr 2.5 - co weakness, lightheadedness  dark stools noted yesterday with diarrhea- nml bm today  no n/v no abd pain no f/c no cough  bp noted to be low today by px hha/personal nurse  no exac/allev factors 89 y/o F w/ PMH of HTN, HLD, COPD, kidney disease - baseline Cr 2.5 - co weakness, lightheadedness  dark stools noted yesterday with diarrhea- nml bm today  no n/v no abd pain no f/c no cough  bp noted to be low today by px hha/personal nurse  no exac/allev factors  as per RN- px with intermittent profuse diarrhea over the past 3 months- better for 1-2 days, then worsens  on Lasix 80- once or twice a day for chronic renal insuff

## 2020-07-07 NOTE — CONSULT NOTE ADULT - SUBJECTIVE AND OBJECTIVE BOX
ICU CONSULT    Patient is a 89y old  Female who presents with a chief complaint of     89yFemale    In the ED- VS were   Labs significant for Hgb 10.9, BUN 51, Cr 4.84, Troponin 0.03, , VBG 7.34 PCO2 60 HCO3 32  CXR showed   EKG showed   Given    PMHx -   PSx -   Meds -   Allergies -   FHx -   Sx -       PHYSICAL EXAM   Vital Signs Last 24 Hrs  T(C): 37.2 (07 Jul 2020 21:27), Max: 37.2 (07 Jul 2020 21:27)  T(F): 99 (07 Jul 2020 21:27), Max: 99 (07 Jul 2020 21:27)  HR: 71 (07 Jul 2020 22:15) (68 - 73)  BP: 116/64 (07 Jul 2020 22:15) (82/46 - 133/65)  BP(mean): --  RR: 18 (07 Jul 2020 22:15) (18 - 18)  SpO2: 94% (07 Jul 2020 22:15) (92% - 95%)      General -   HEENT -   CV -   Resp -   Abdomen -   Extremities -   Skin -       LABS                        10.9   8.26  )-----------( 291      ( 07 Jul 2020 21:35 )             36.0     07-07    134<L>  |  91<L>  |  51<H>  ----------------------------<  129<H>  4.4   |  29  |  4.84<H>    Ca    9.1      07 Jul 2020 21:35  Mg     2.1     07-07    TPro  6.5  /  Alb  3.3  /  TBili  0.3  /  DBili  x   /  AST  34  /  ALT  19  /  AlkPhos  113  07-07    PT/INR - ( 07 Jul 2020 21:35 )   PT: 11.6 sec;   INR: 0.97          PTT - ( 07 Jul 2020 21:35 )  PTT:32.5 sec  Lactate, Blood: 2.0 mmol/L (07-07-20 @ 21:35)    IMAGING   CXR - no acute infiltrates ICU CONSULT    Patient is a 89y old  Female who presents with a chief complaint of diarrhea     89yFemale w/ PMHx of HTN, HLD, CKD, hypothyroid COPD/reactive airway disease, depression, gout presents with lightheadedness, weakness, and diarrhea for the past several days. She has a home health aid at bedside who reports continued diarrhea (past 3 months) with multiple episodes per day and decreased PO intake. She was also noted to have decreased bp at home today. Of note, baseline Cr 2.5. She has a non healing heel ulcer that has been debrided multiple times by Vascular sx     In the ED- VS were T99, HR 68-72, BP 90//60 RR 18, SPO2 94%  Labs significant for Hgb 10.9, BUN 51, Cr 4.84, Troponin 0.03, , VBG 7.34 PCO2 60 HCO3 32  CXR showed showed pulmonary vascular congestion  Given 80mg Protonix and 1.5L NS    PMHx - HTN, HLD, CKD, hypothyroid COPD/reactive airway disease, depression, gout  PSx -   Meds -   Allergies - none    FHx - none  Sx - no illicit drug use or alcohol use       PHYSICAL EXAM   Vital Signs Last 24 Hrs  T(C): 37.2 (07 Jul 2020 21:27), Max: 37.2 (07 Jul 2020 21:27)  T(F): 99 (07 Jul 2020 21:27), Max: 99 (07 Jul 2020 21:27)  HR: 71 (07 Jul 2020 22:15) (68 - 73)  BP: 116/64 (07 Jul 2020 22:15) (82/46 - 133/65)  BP(mean): --  RR: 18 (07 Jul 2020 22:15) (18 - 18)  SpO2: 94% (07 Jul 2020 22:15) (92% - 95%)      General -   HEENT -   CV -   Resp -   Abdomen -   Extremities -   Skin -       LABS                        10.9   8.26  )-----------( 291      ( 07 Jul 2020 21:35 )             36.0     07-07    134<L>  |  91<L>  |  51<H>  ----------------------------<  129<H>  4.4   |  29  |  4.84<H>    Ca    9.1      07 Jul 2020 21:35  Mg     2.1     07-07    TPro  6.5  /  Alb  3.3  /  TBili  0.3  /  DBili  x   /  AST  34  /  ALT  19  /  AlkPhos  113  07-07    PT/INR - ( 07 Jul 2020 21:35 )   PT: 11.6 sec;   INR: 0.97          PTT - ( 07 Jul 2020 21:35 )  PTT:32.5 sec  Lactate, Blood: 2.0 mmol/L (07-07-20 @ 21:35)    IMAGING   CXR - no acute infiltrates ICU CONSULT    Patient is a 89y old  Female who presents with a chief complaint of diarrhea     89yFemale w/ PMHx of HTN, HLD, CKD, hypothyroid COPD/reactive airway disease, depression, gout presents with lightheadedness, weakness, and diarrhea for the past several days. She has a home health aid at bedside who reports continued diarrhea (past 3 months) with multiple episodes per day and decreased PO intake. She was also noted to have decreased bp at home today. Of note, baseline Cr 2.5. She has a non healing heel ulcer that has been debrided multiple times by Vascular sx     In the ED- VS were T99, HR 68-72, BP 90//60 RR 18, SPO2 94%  Labs significant for Hgb 10.9, BUN 51, Cr 4.84, Troponin 0.03, , VBG 7.34 PCO2 60 HCO3 32  CXR showed showed pulmonary vascular congestion  Given 80mg Protonix and 1.5L NS    PMHx - HTN, HLD, CKD, hypothyroid COPD/reactive airway disease, depression, gout  PSx -   Meds -   Allergies - none    FHx - none  Sx - no illicit drug use or alcohol use       PHYSICAL EXAM   Vital Signs Last 24 Hrs  T(C): 37.2 (07 Jul 2020 21:27), Max: 37.2 (07 Jul 2020 21:27)  T(F): 99 (07 Jul 2020 21:27), Max: 99 (07 Jul 2020 21:27)  HR: 71 (07 Jul 2020 22:15) (68 - 73)  BP: 116/64 (07 Jul 2020 22:15) (82/46 - 133/65)  BP(mean): --  RR: 18 (07 Jul 2020 22:15) (18 - 18)  SpO2: 94% (07 Jul 2020 22:15) (92% - 95%)      General - elderly female in NAD   HEENT - MMM, no JVD   CV - normal s1/s2, no murmurs   Resp - clear air entry b/l   Abdomen - soft, non tender, non distended   Extremities - +1 LE edema, warm well perfused  Skin - unstageable right heel ulcer, chronic venous stasis changes       LABS                        10.9   8.26  )-----------( 291      ( 07 Jul 2020 21:35 )             36.0     07-07    134<L>  |  91<L>  |  51<H>  ----------------------------<  129<H>  4.4   |  29  |  4.84<H>    Ca    9.1      07 Jul 2020 21:35  Mg     2.1     07-07    TPro  6.5  /  Alb  3.3  /  TBili  0.3  /  DBili  x   /  AST  34  /  ALT  19  /  AlkPhos  113  07-07    PT/INR - ( 07 Jul 2020 21:35 )   PT: 11.6 sec;   INR: 0.97          PTT - ( 07 Jul 2020 21:35 )  PTT:32.5 sec  Lactate, Blood: 2.0 mmol/L (07-07-20 @ 21:35)    IMAGING   CXR - no acute infiltrates ICU CONSULT    Patient is a 89y old  Female who presents with a chief complaint of diarrhea     89yFemale w/ PMHx of HTN, HLD, CKD, hypothyroid COPD/reactive airway disease, depression, gout presents with lightheadedness, weakness, and diarrhea for the past several days. She has a home health aid at bedside who reports continued diarrhea (past 3 months) with multiple episodes per day and decreased PO intake. She does note her stools have been black and foul smelling but that she is also on supplemental iron. She was also noted to have decreased bp at home today. Of note, baseline Cr 2.5. She has a non healing heel ulcer that is being followed by Dr. Oliva without recent abx administration.    In the ED- VS were T99, HR 68-72, BP 90//60 RR 18, SPO2 94%  Labs significant for Hgb 10.9, BUN 51, Cr 4.84, Troponin 0.03, , VBG 7.34 PCO2 60 HCO3 32  CXR showed showed pulmonary vascular congestion  Given 80mg Protonix and 1.5L NS    PMHx - HTN, HLD, CKD, hypothyroid COPD/reactive airway disease, depression, gout  PSx -   Meds -   Allergies - none    FHx - none  Sx - no illicit drug use or alcohol use       PHYSICAL EXAM   Vital Signs Last 24 Hrs  T(C): 37.2 (07 Jul 2020 21:27), Max: 37.2 (07 Jul 2020 21:27)  T(F): 99 (07 Jul 2020 21:27), Max: 99 (07 Jul 2020 21:27)  HR: 71 (07 Jul 2020 22:15) (68 - 73)  BP: 116/64 (07 Jul 2020 22:15) (82/46 - 133/65)  BP(mean): --  RR: 18 (07 Jul 2020 22:15) (18 - 18)  SpO2: 94% (07 Jul 2020 22:15) (92% - 95%)      General - elderly female in NAD   HEENT - MMM, no JVD   CV - normal s1/s2, no murmurs   Resp - clear air entry b/l   Abdomen - soft, non tender, non distended   Extremities - +1 LE edema, warm well perfused  Skin - unstageable right heel ulcer, chronic venous stasis changes       LABS                        10.9   8.26  )-----------( 291      ( 07 Jul 2020 21:35 )             36.0     07-07    134<L>  |  91<L>  |  51<H>  ----------------------------<  129<H>  4.4   |  29  |  4.84<H>    Ca    9.1      07 Jul 2020 21:35  Mg     2.1     07-07    TPro  6.5  /  Alb  3.3  /  TBili  0.3  /  DBili  x   /  AST  34  /  ALT  19  /  AlkPhos  113  07-07    PT/INR - ( 07 Jul 2020 21:35 )   PT: 11.6 sec;   INR: 0.97          PTT - ( 07 Jul 2020 21:35 )  PTT:32.5 sec  Lactate, Blood: 2.0 mmol/L (07-07-20 @ 21:35)    IMAGING   CXR - no acute infiltrates ICU CONSULT    Patient is a 89y old  Female who presents with a chief complaint of diarrhea     89yFemale w/ PMHx of HTN, HLD, CKD, hypothyroid COPD/reactive airway disease, depression, gout presents with lightheadedness, weakness, and diarrhea for the past several days. She has a home health aid at bedside who reports continued diarrhea (past 3 months) with multiple episodes per day and decreased PO intake. She does note her stools have been black and foul smelling but that she is also on supplemental iron. She was also noted to have decreased bp at home today. Of note, baseline Cr 2.5. She has a non healing heel ulcer that is being followed by Dr. Oliva without recent abx administration.    In the ED- VS were T99, HR 68-72, BP 90//60 RR 18, SPO2 94%  Labs significant for Hgb 10.9, BUN 51, Cr 4.84, Troponin 0.03, , VBG 7.34 PCO2 60 HCO3 32  CXR showed showed pulmonary vascular congestion  Given 80mg Protonix and 1.5L NS    PMHx - HTN, HLD, CKD, hypothyroid COPD/reactive airway disease, depression, gout  PSx - none   Allergies - none    FHx - none  Sx - no illicit drug use or alcohol use       PHYSICAL EXAM   Vital Signs Last 24 Hrs  T(C): 37.2 (07 Jul 2020 21:27), Max: 37.2 (07 Jul 2020 21:27)  T(F): 99 (07 Jul 2020 21:27), Max: 99 (07 Jul 2020 21:27)  HR: 71 (07 Jul 2020 22:15) (68 - 73)  BP: 116/64 (07 Jul 2020 22:15) (82/46 - 133/65)  BP(mean): --  RR: 18 (07 Jul 2020 22:15) (18 - 18)  SpO2: 94% (07 Jul 2020 22:15) (92% - 95%)      General - elderly female in NAD   HEENT - MMM, no JVD   CV - normal s1/s2, no murmurs   Resp - clear air entry b/l   Abdomen - soft, non tender, non distended   Extremities - +1 LE edema, warm well perfused  Skin - unstageable right heel ulcer, chronic venous stasis changes       LABS                        10.9   8.26  )-----------( 291      ( 07 Jul 2020 21:35 )             36.0     07-07    134<L>  |  91<L>  |  51<H>  ----------------------------<  129<H>  4.4   |  29  |  4.84<H>    Ca    9.1      07 Jul 2020 21:35  Mg     2.1     07-07    TPro  6.5  /  Alb  3.3  /  TBili  0.3  /  DBili  x   /  AST  34  /  ALT  19  /  AlkPhos  113  07-07    PT/INR - ( 07 Jul 2020 21:35 )   PT: 11.6 sec;   INR: 0.97          PTT - ( 07 Jul 2020 21:35 )  PTT:32.5 sec  Lactate, Blood: 2.0 mmol/L (07-07-20 @ 21:35)    IMAGING   CXR - no acute infiltrates

## 2020-07-07 NOTE — ED ADULT TRIAGE NOTE - WEIGHT IN LBS
Progress Notes by Fariha Kwong PT at 01/25/18 09:57 AM     Author:  Fariha Kwong PT Service:  (none) Author Type:  Physical Therapist     Filed:  01/25/18 10:11 AM Encounter Date:  1/25/2018 Status:  Signed     :  Fariha Kwong PT (Physical Therapist)              PHYSICAL THERAPY DAILY PROGRESS NOTE    DIAGNOSIS:   1. Chronic pain of both shoulders    2. Primary osteoarthritis of both shoulders    3. Impingement syndrome of shoulder region, unspecified laterality      INSURANCE BENEFITS: Patient has 60 visits in benefit as of January.     PHYSICIAN RECOMMENDATIONS: Evaluate and Treat     ATTENDANCE: Patient has been seen for[MO1.1C] 5[MO1.1M] visits between 1/11/2018 and[MO1.1C]  1/25/2018[MO1.1T].  Progress Summary due by 2/10/18.    SUBJECTIVE:[MO1.1C]      Patient[MO1.1T] reports that he is still getting the clunking into the[MO1.1M] left[MO1.1T] shoulder but not really painful.[MO1.1M] Patient[MO1.1T] continues to feel better.[MO1.1M]     OBJECTIVE:     Observation/Posture: increased anterior rotation of humeral head, sacral sitting, with shoulder bilateral shoulder flexion and abduction patient has shoulder elevation, audible clunking noted into the left shoulder with passive range of motion      Range of Motion: Active shoulder range of motion in degrees (*=pain):   Right  1/11/2018  Left  1/11/2018    Flexion 133 128   Abduction 108 85   External Rotation T1 T1   Internal Rotation right hip  left of L3   Extension 64 50     Passive shoulder range of motion in degrees (*=pain):   Right  1/11/2018  Left  1/11/2018    Flexion 150 150   Abduction 155 155   External Rotation 70 60   Internal Rotation 30 35     Manual Muscle Test: not enough range of motion to formally assess but within available range strength is 5/5 bilateral except bilateral abduction and external rotation  5-/5    Neurological: intact to light touch bilateral     Palpation: moderate to significant soft tissue restrictions through the  bilateral pectoral muscles, moderate soft tissue restrictions through the right>left infraspinatus and kinza's minor, subscapularis and medial scapular border     Joint Mobility:decreased anterior, posterior, lateral and inferior direction of the bilateral GH joint    Special Tests: positive for Hooks-Topher bilateral, negative for empty can and OBriens     Functional Assessment:   Gait: no gait deviations noted    TREATMENT TODAY:   Time in: 8:[MO1.1C]50[MO1.1M]     Time out:  9:[MO1.1C]50[MO1.1M]      Manual Therapy to  increase range of motion and improve soft tissue and joint mobility:  97140 x 1 units -  18 minutes  Soft tissue mobility through the bilateral pectoral, upper trapezius and posterior capsule  Scapular mobilization all planes  Long axis distraction  Grade II inferior and posterior glides of bilateral shoulders    Therapeutic Exercise to increase range of motion, increase strength and instruct in a home exercise program:  [id number] x 3 units -  40 minutes  Retro Arm bike 5 minutes  Pulley's 20x  passive range of motion to the bilateral shoulders in supine - all planes of motion   Posterior capsule stretch in side lying x 30 seconds each   Thoracic clocks 5seconds x10 each[MO1.1C] - defer[MO1.1M]  Speed pulley: rows, extension 3 plates 2 x 15 with green theraband   Green bilateral external rotation x 20   Wall push ups x 20[MO1.1C] multidirectional[MO1.1M]  Wall clocks 5x to ~140deg on left and to 180 deg on right   Doorway pectoral stretch [id number]  Rhythmic stabilization with bal on wall x 10 into all 4 directions  Active cones lifts - flexion: to top shelf on right x 1 and to top moveable shelf on left x 1; scaption: to top moveable shelf on right x 1[MO1.1C]   internal rotation[MO1.1T] on speed pulleys with 2 plates x 15[MO1.1M]     Precautions: none noted    Home exercise program (last updated (1/22/2018): Patient issued written home exercise program.  Patient demonstrated exercises correctly  and was instructed to call with questions.   Repeat 2 times per day   Supine cane flexion and external rotation x 10 each   Standing cane abduction bilateral x 10 each   Doorway pectoral stretch x 30 seconds   Standing cane - extension and pushing across the back x 10 each direction    ASSESSMENT/TREATMENT RESPONSE:    Patient's response to treatment:[MO1.1C] if maintained the scapular set position was able to complete exercises without clunking into the[MO1.1M] left shoulder[MO1.1T], decreased inferior glide is still present but making gains into[MO1.1M] passive range of motion[MO1.1T] into all planes of motion, scaption and[MO1.1M] internal rotation[MO1.1T] on speed pulleys both needed the tactile cue for scap set position throughout exercise[MO1.1M]    Functional improvement noted:[MO1.1C] with tactile cuing able to better find scapular set position but[MO1.1M] difficulty[MO1.1T] with maintaining[MO1.1M]    Prognosis for meeting goals:  good.   Treatment will consist of home program, manual therapy, neuromuscular re-education, Physical therapy evaluation and therapeutic exercise.  Treatment plan was discussed with the patient and verbal consent was obtained.    Remaining Impairment Requiring Continued Treatment: decreased range of motion, decreased strength, pain and impairment of functional performance    Short Term Goals (to be achieved in 2 weeks)  1. Patient will be independent with home exercise program.[MO1.1C] - MET, 1/25/18[MO1.1M]  2. Patient will increase left shoulder active range of motion - flexion to 150 degrees and abduction to 120 degrees in order to enhance ability to reach overhead.   3. Patient will increase right shoulder active range of motion - flexion to 150 degrees and abduction to 130 degrees in order to enhance reaching. - able to get to about 140 degrees shoulder abduction with wall clocks, 1/18/18  4. Patient will report 50% improvement in ability to sleep through the night.  5.    Patient will report 30% reduction in clunking into the left shoulder. - no clunkin gnoted woth passive range of motion today, 1/22/18    Long Term Goals (to be achieved in 6 weeks)  1. Patient will have shoulder shoulder strength formally assessed.   2. Patient will increase bilateral shoulder internal rotation to T9 in order to out coat on and off without difficulty.   3. Patient will be able to lie on the right side for 45-60 minutes in order to enhance ability to sleep.  4. Patient will have more goals set as needed.     PLAN:[MO1.1C] more scapular retraction activities.[MO1.1M]     Patient will be seen 2 times per week for 6 weeks.     Therapist Signature:[MO1.1C] Electronically Signed by:    Fariha Kwong PT , 1/25/2018[MO1.1T]       Revision History        User Key Date/Time User Provider Type Action    > MO1.1 01/25/18 10:11 AM Fariha Kwong PT Physical Therapist Sign    C - Copied, M - Manual, T - Template             141.9

## 2020-07-07 NOTE — ED PROVIDER NOTE - CARE PLAN
Principal Discharge DX:	Hypotension, unspecified hypotension type  Secondary Diagnosis:	Diarrhea, unspecified type  Secondary Diagnosis:	Acute renal failure superimposed on chronic kidney disease, unspecified CKD stage, unspecified acute renal failure type

## 2020-07-08 ENCOUNTER — APPOINTMENT (OUTPATIENT)
Dept: GASTROENTEROLOGY | Facility: CLINIC | Age: 85
End: 2020-07-08

## 2020-07-08 ENCOUNTER — INPATIENT (INPATIENT)
Facility: HOSPITAL | Age: 85
LOS: 1 days | Discharge: HOME | DRG: 593 | End: 2020-07-10
Attending: HOSPITALIST | Admitting: HOSPITALIST
Payer: MEDICARE

## 2020-07-08 DIAGNOSIS — R82.71 BACTERIURIA: ICD-10-CM

## 2020-07-08 DIAGNOSIS — I95.9 HYPOTENSION, UNSPECIFIED: ICD-10-CM

## 2020-07-08 DIAGNOSIS — R09.89 OTHER SPECIFIED SYMPTOMS AND SIGNS INVOLVING THE CIRCULATORY AND RESPIRATORY SYSTEMS: ICD-10-CM

## 2020-07-08 DIAGNOSIS — I10 ESSENTIAL (PRIMARY) HYPERTENSION: ICD-10-CM

## 2020-07-08 DIAGNOSIS — N17.9 ACUTE KIDNEY FAILURE, UNSPECIFIED: ICD-10-CM

## 2020-07-08 DIAGNOSIS — J44.9 CHRONIC OBSTRUCTIVE PULMONARY DISEASE, UNSPECIFIED: ICD-10-CM

## 2020-07-08 DIAGNOSIS — M10.9 GOUT, UNSPECIFIED: ICD-10-CM

## 2020-07-08 DIAGNOSIS — L97.409 NON-PRESSURE CHRONIC ULCER OF UNSPECIFIED HEEL AND MIDFOOT WITH UNSPECIFIED SEVERITY: ICD-10-CM

## 2020-07-08 DIAGNOSIS — E03.9 HYPOTHYROIDISM, UNSPECIFIED: ICD-10-CM

## 2020-07-08 DIAGNOSIS — Z98.89 OTHER SPECIFIED POSTPROCEDURAL STATES: Chronic | ICD-10-CM

## 2020-07-08 DIAGNOSIS — R19.7 DIARRHEA, UNSPECIFIED: ICD-10-CM

## 2020-07-08 DIAGNOSIS — Z90.710 ACQUIRED ABSENCE OF BOTH CERVIX AND UTERUS: Chronic | ICD-10-CM

## 2020-07-08 DIAGNOSIS — Z29.9 ENCOUNTER FOR PROPHYLACTIC MEASURES, UNSPECIFIED: ICD-10-CM

## 2020-07-08 DIAGNOSIS — R79.89 OTHER SPECIFIED ABNORMAL FINDINGS OF BLOOD CHEMISTRY: ICD-10-CM

## 2020-07-08 LAB
ALBUMIN SERPL ELPH-MCNC: 2.3 G/DL — LOW (ref 3.3–5)
ALP SERPL-CCNC: 88 U/L — SIGNIFICANT CHANGE UP (ref 40–120)
ALT FLD-CCNC: 14 U/L — SIGNIFICANT CHANGE UP (ref 10–45)
ANION GAP SERPL CALC-SCNC: 12 MMOL/L — SIGNIFICANT CHANGE UP (ref 5–17)
ANION GAP SERPL CALC-SCNC: 14 MMOL/L — SIGNIFICANT CHANGE UP (ref 5–17)
AST SERPL-CCNC: 28 U/L — SIGNIFICANT CHANGE UP (ref 10–40)
BASE EXCESS BLDA CALC-SCNC: 0.1 MMOL/L — SIGNIFICANT CHANGE UP (ref -2–3)
BASOPHILS # BLD AUTO: 0.05 K/UL — SIGNIFICANT CHANGE UP (ref 0–0.2)
BASOPHILS NFR BLD AUTO: 0.6 % — SIGNIFICANT CHANGE UP (ref 0–2)
BILIRUB SERPL-MCNC: 0.3 MG/DL — SIGNIFICANT CHANGE UP (ref 0.2–1.2)
BUN SERPL-MCNC: 44 MG/DL — HIGH (ref 7–23)
BUN SERPL-MCNC: 45 MG/DL — HIGH (ref 7–23)
CALCIUM SERPL-MCNC: 8.1 MG/DL — LOW (ref 8.4–10.5)
CALCIUM SERPL-MCNC: 8.3 MG/DL — LOW (ref 8.4–10.5)
CHLORIDE SERPL-SCNC: 104 MMOL/L — SIGNIFICANT CHANGE UP (ref 96–108)
CHLORIDE SERPL-SCNC: 99 MMOL/L — SIGNIFICANT CHANGE UP (ref 96–108)
CO2 SERPL-SCNC: 22 MMOL/L — SIGNIFICANT CHANGE UP (ref 22–31)
CO2 SERPL-SCNC: 24 MMOL/L — SIGNIFICANT CHANGE UP (ref 22–31)
CREAT ?TM UR-MCNC: 17 MG/DL — SIGNIFICANT CHANGE UP
CREAT ?TM UR-MCNC: 44 MG/DL — SIGNIFICANT CHANGE UP
CREAT SERPL-MCNC: 3.92 MG/DL — HIGH (ref 0.5–1.3)
CREAT SERPL-MCNC: 4.31 MG/DL — HIGH (ref 0.5–1.3)
EOSINOPHIL # BLD AUTO: 0.06 K/UL — SIGNIFICANT CHANGE UP (ref 0–0.5)
EOSINOPHIL NFR BLD AUTO: 0.7 % — SIGNIFICANT CHANGE UP (ref 0–6)
GLUCOSE SERPL-MCNC: 102 MG/DL — HIGH (ref 70–99)
GLUCOSE SERPL-MCNC: 87 MG/DL — SIGNIFICANT CHANGE UP (ref 70–99)
GRAM STN FLD: SIGNIFICANT CHANGE UP
HCO3 BLDA-SCNC: 25 MMOL/L — SIGNIFICANT CHANGE UP (ref 21–28)
HCT VFR BLD CALC: 32.3 % — LOW (ref 34.5–45)
HGB BLD-MCNC: 9.8 G/DL — LOW (ref 11.5–15.5)
IMM GRANULOCYTES NFR BLD AUTO: 0.7 % — SIGNIFICANT CHANGE UP (ref 0–1.5)
LIDOCAIN IGE QN: 14 U/L — SIGNIFICANT CHANGE UP (ref 7–60)
LYMPHOCYTES # BLD AUTO: 2.23 K/UL — SIGNIFICANT CHANGE UP (ref 1–3.3)
LYMPHOCYTES # BLD AUTO: 26.4 % — SIGNIFICANT CHANGE UP (ref 13–44)
MAGNESIUM SERPL-MCNC: 1.9 MG/DL — SIGNIFICANT CHANGE UP (ref 1.6–2.6)
MCHC RBC-ENTMCNC: 30.3 GM/DL — LOW (ref 32–36)
MCHC RBC-ENTMCNC: 30.5 PG — SIGNIFICANT CHANGE UP (ref 27–34)
MCV RBC AUTO: 100.6 FL — HIGH (ref 80–100)
MONOCYTES # BLD AUTO: 0.93 K/UL — HIGH (ref 0–0.9)
MONOCYTES NFR BLD AUTO: 11 % — SIGNIFICANT CHANGE UP (ref 2–14)
NEUTROPHILS # BLD AUTO: 5.12 K/UL — SIGNIFICANT CHANGE UP (ref 1.8–7.4)
NEUTROPHILS NFR BLD AUTO: 60.6 % — SIGNIFICANT CHANGE UP (ref 43–77)
NRBC # BLD: 0 /100 WBCS — SIGNIFICANT CHANGE UP (ref 0–0)
PCO2 BLDA: 44 MMHG — SIGNIFICANT CHANGE UP (ref 32–45)
PH BLDA: 7.38 — SIGNIFICANT CHANGE UP (ref 7.35–7.45)
PHOSPHATE SERPL-MCNC: 4.9 MG/DL — HIGH (ref 2.5–4.5)
PLATELET # BLD AUTO: 243 K/UL — SIGNIFICANT CHANGE UP (ref 150–400)
PO2 BLDA: 78 MMHG — LOW (ref 83–108)
POTASSIUM SERPL-MCNC: 4.2 MMOL/L — SIGNIFICANT CHANGE UP (ref 3.5–5.3)
POTASSIUM SERPL-MCNC: 4.5 MMOL/L — SIGNIFICANT CHANGE UP (ref 3.5–5.3)
POTASSIUM SERPL-SCNC: 4.2 MMOL/L — SIGNIFICANT CHANGE UP (ref 3.5–5.3)
POTASSIUM SERPL-SCNC: 4.5 MMOL/L — SIGNIFICANT CHANGE UP (ref 3.5–5.3)
PROT SERPL-MCNC: 5.4 G/DL — LOW (ref 6–8.3)
RBC # BLD: 3.21 M/UL — LOW (ref 3.8–5.2)
RBC # FLD: 14.5 % — SIGNIFICANT CHANGE UP (ref 10.3–14.5)
SAO2 % BLDA: 94 % — LOW (ref 95–100)
SARS-COV-2 RNA SPEC QL NAA+PROBE: SIGNIFICANT CHANGE UP
SODIUM SERPL-SCNC: 137 MMOL/L — SIGNIFICANT CHANGE UP (ref 135–145)
SODIUM SERPL-SCNC: 138 MMOL/L — SIGNIFICANT CHANGE UP (ref 135–145)
SODIUM UR-SCNC: 28 MMOL/L — SIGNIFICANT CHANGE UP
SODIUM UR-SCNC: 39 MMOL/L — SIGNIFICANT CHANGE UP
SPECIMEN SOURCE: SIGNIFICANT CHANGE UP
T3FREE SERPL-MCNC: 1.25 PG/ML — LOW (ref 1.8–4.6)
T4 FREE SERPL-MCNC: 1.07 NG/DL — SIGNIFICANT CHANGE UP (ref 0.7–1.48)
TROPONIN T SERPL-MCNC: 0.03 NG/ML — HIGH (ref 0–0.01)
UUN UR-MCNC: 280 MG/DL — SIGNIFICANT CHANGE UP
WBC # BLD: 8.45 K/UL — SIGNIFICANT CHANGE UP (ref 3.8–10.5)
WBC # FLD AUTO: 8.45 K/UL — SIGNIFICANT CHANGE UP (ref 3.8–10.5)

## 2020-07-08 PROCEDURE — 93010 ELECTROCARDIOGRAM REPORT: CPT

## 2020-07-08 PROCEDURE — 74018 RADEX ABDOMEN 1 VIEW: CPT | Mod: 26

## 2020-07-08 PROCEDURE — 99221 1ST HOSP IP/OBS SF/LOW 40: CPT | Mod: GC

## 2020-07-08 PROCEDURE — 99222 1ST HOSP IP/OBS MODERATE 55: CPT

## 2020-07-08 PROCEDURE — 99223 1ST HOSP IP/OBS HIGH 75: CPT | Mod: GC

## 2020-07-08 PROCEDURE — 93970 EXTREMITY STUDY: CPT | Mod: 26

## 2020-07-08 PROCEDURE — 99285 EMERGENCY DEPT VISIT HI MDM: CPT | Mod: CS

## 2020-07-08 PROCEDURE — 93306 TTE W/DOPPLER COMPLETE: CPT | Mod: 26

## 2020-07-08 PROCEDURE — 99233 SBSQ HOSP IP/OBS HIGH 50: CPT | Mod: GC

## 2020-07-08 RX ORDER — ATORVASTATIN CALCIUM 80 MG/1
40 TABLET, FILM COATED ORAL AT BEDTIME
Refills: 0 | Status: DISCONTINUED | OUTPATIENT
Start: 2020-07-08 | End: 2020-07-10

## 2020-07-08 RX ORDER — PANTOPRAZOLE SODIUM 20 MG/1
40 TABLET, DELAYED RELEASE ORAL
Refills: 0 | Status: DISCONTINUED | OUTPATIENT
Start: 2020-07-08 | End: 2020-07-10

## 2020-07-08 RX ORDER — COLCHICINE 0.6 MG
0.6 TABLET ORAL DAILY
Refills: 0 | Status: DISCONTINUED | OUTPATIENT
Start: 2020-07-08 | End: 2020-07-08

## 2020-07-08 RX ORDER — ROSUVASTATIN CALCIUM 5 MG/1
1 TABLET ORAL
Qty: 0 | Refills: 0 | DISCHARGE

## 2020-07-08 RX ORDER — VALSARTAN 80 MG/1
1 TABLET ORAL
Qty: 0 | Refills: 0 | DISCHARGE

## 2020-07-08 RX ORDER — DULOXETINE HYDROCHLORIDE 30 MG/1
20 CAPSULE, DELAYED RELEASE ORAL DAILY
Refills: 0 | Status: DISCONTINUED | OUTPATIENT
Start: 2020-07-08 | End: 2020-07-10

## 2020-07-08 RX ORDER — LEVOTHYROXINE SODIUM 125 MCG
75 TABLET ORAL DAILY
Refills: 0 | Status: DISCONTINUED | OUTPATIENT
Start: 2020-07-08 | End: 2020-07-10

## 2020-07-08 RX ORDER — SODIUM CHLORIDE 9 MG/ML
1000 INJECTION INTRAMUSCULAR; INTRAVENOUS; SUBCUTANEOUS
Refills: 0 | Status: DISCONTINUED | OUTPATIENT
Start: 2020-07-08 | End: 2020-07-10

## 2020-07-08 RX ORDER — DESIPRAMINE HYDROCHLORIDE 100 MG/1
1 TABLET ORAL
Qty: 0 | Refills: 0 | DISCHARGE

## 2020-07-08 RX ORDER — HEPARIN SODIUM 5000 [USP'U]/ML
5000 INJECTION INTRAVENOUS; SUBCUTANEOUS EVERY 8 HOURS
Refills: 0 | Status: DISCONTINUED | OUTPATIENT
Start: 2020-07-08 | End: 2020-07-10

## 2020-07-08 RX ORDER — ALBUTEROL 90 UG/1
1 AEROSOL, METERED ORAL
Refills: 0 | Status: DISCONTINUED | OUTPATIENT
Start: 2020-07-08 | End: 2020-07-10

## 2020-07-08 RX ADMIN — HEPARIN SODIUM 5000 UNIT(S): 5000 INJECTION INTRAVENOUS; SUBCUTANEOUS at 15:19

## 2020-07-08 RX ADMIN — Medication 75 MICROGRAM(S): at 06:49

## 2020-07-08 RX ADMIN — HEPARIN SODIUM 5000 UNIT(S): 5000 INJECTION INTRAVENOUS; SUBCUTANEOUS at 22:00

## 2020-07-08 RX ADMIN — DULOXETINE HYDROCHLORIDE 20 MILLIGRAM(S): 30 CAPSULE, DELAYED RELEASE ORAL at 15:20

## 2020-07-08 RX ADMIN — SODIUM CHLORIDE 100 MILLILITER(S): 9 INJECTION INTRAMUSCULAR; INTRAVENOUS; SUBCUTANEOUS at 10:48

## 2020-07-08 RX ADMIN — Medication 150 MILLIGRAM(S): at 06:49

## 2020-07-08 RX ADMIN — SODIUM CHLORIDE 70 MILLILITER(S): 9 INJECTION INTRAMUSCULAR; INTRAVENOUS; SUBCUTANEOUS at 16:00

## 2020-07-08 RX ADMIN — ALBUTEROL 1 PUFF(S): 90 AEROSOL, METERED ORAL at 06:49

## 2020-07-08 RX ADMIN — ALBUTEROL 1 PUFF(S): 90 AEROSOL, METERED ORAL at 22:00

## 2020-07-08 RX ADMIN — ALBUTEROL 1 PUFF(S): 90 AEROSOL, METERED ORAL at 12:51

## 2020-07-08 RX ADMIN — SODIUM CHLORIDE 100 MILLILITER(S): 9 INJECTION INTRAMUSCULAR; INTRAVENOUS; SUBCUTANEOUS at 01:01

## 2020-07-08 RX ADMIN — HEPARIN SODIUM 5000 UNIT(S): 5000 INJECTION INTRAVENOUS; SUBCUTANEOUS at 06:49

## 2020-07-08 RX ADMIN — ATORVASTATIN CALCIUM 40 MILLIGRAM(S): 80 TABLET, FILM COATED ORAL at 22:00

## 2020-07-08 NOTE — CONSULT NOTE ADULT - SUBJECTIVE AND OBJECTIVE BOX
Patient is a 89y old  Female who presents with a chief complaint of hypotension and right heel ulcer (2020 02:03)       HPI:  89F PMH HTN, HLD, CKD, hypothyroid COPD/reactive airway disease, depression, gout presents with lightheadedness, weakness, and diarrhea for the past several months. She has a home health aid at bedside who reports continued dark-colored diarrhea for the 3 months with multiple episodes per day and decreased PO intake. She does note her stools have been black and foul smelling but that she is also on supplemental iron. She also states that she has been experiencing nausea and nonbilious, nonbloody vomiting intermittently for the past week intermittently after eating. She was also noted to have decreased blood pressures at home for the past week, reporting systolic blood pressures as low as 70. Of note, patient has infected right heel ulcer. She states ulcer has been present for about 5 months. She has been seeing Dr. Oliva who debrided the ulcer about 1-2 weeks ago. She was instructed to do wet to dry dressing with betadine twice a day which she has been doing. Currently patient denies any fever, chills, chest pain, dizziness, lightheadedness, abdominal pain, dysuria or frequent urination.    In the ED- VS were T99, HR 68-72, BP 90//60 RR 18, SPO2 94% RA  Labs significant for Hgb 10.9, BUN 51, Cr 4.84, Troponin 0.03, , VBG 7.34 PCO2 60 HCO3 32 UA+ EKG: NSR, no acute ischemic findings   CXR showed pulmonary vascular congestion  Given Vancomycin IV 1g x1, Zosyn IV 3.375g x1, Protonix 80mg, and 1.5L NS (2020 02:03)      PAST MEDICAL & SURGICAL HISTORY:  Gout  Depression  Urinary retention  Essential hypertension  Other specified hypothyroidism  Chronic kidney disease, stage IV (severe)  History of cholecystectomy  H/O abdominal hysterectomy      MEDICATIONS  (STANDING):  ALBUTerol    90 MICROgram(s) HFA Inhaler 1 Puff(s) Inhalation four times a day  atorvastatin 40 milliGRAM(s) Oral at bedtime  DULoxetine 20 milliGRAM(s) Oral daily  heparin   Injectable 5000 Unit(s) SubCutaneous every 8 hours  levothyroxine 75 MICROGram(s) Oral daily  pantoprazole    Tablet 40 milliGRAM(s) Oral before breakfast  sodium chloride 0.9%. 1000 milliLiter(s) (100 mL/Hr) IV Continuous <Continuous>    MEDICATIONS  (PRN):      FAMILY HISTORY:  No pertinent family history in first degree relatives      CBC Full  -  ( 2020 06:46 )  WBC Count : 8.45 K/uL  RBC Count : 3.21 M/uL  Hemoglobin : 9.8 g/dL  Hematocrit : 32.3 %  Platelet Count - Automated : 243 K/uL  Mean Cell Volume : 100.6 fl  Mean Cell Hemoglobin : 30.5 pg  Mean Cell Hemoglobin Concentration : 30.3 gm/dL  Auto Neutrophil # : 5.12 K/uL  Auto Lymphocyte # : 2.23 K/uL  Auto Monocyte # : 0.93 K/uL  Auto Eosinophil # : 0.06 K/uL  Auto Basophil # : 0.05 K/uL  Auto Neutrophil % : 60.6 %  Auto Lymphocyte % : 26.4 %  Auto Monocyte % : 11.0 %  Auto Eosinophil % : 0.7 %  Auto Basophil % : 0.6 %      07-08    137  |  99  |  44<H>  ----------------------------<  102<H>  4.2   |  24  |  4.31<H>    Ca    8.1<L>      2020 06:46  Phos  4.9     07-08  Mg     1.9     07-08    TPro  5.4<L>  /  Alb  2.3<L>  /  TBili  0.3  /  DBili  x   /  AST  28  /  ALT  14  /  AlkPhos  88  07-08      Urinalysis Basic - ( 2020 23:12 )    Color: Yellow / Appearance: SL Cloudy / S.010 / pH: x  Gluc: x / Ketone: NEGATIVE  / Bili: Negative / Urobili: 0.2 E.U./dL   Blood: x / Protein: 30 mg/dL / Nitrite: POSITIVE   Leuk Esterase: Small / RBC: < 5 /HPF / WBC < 5 /HPF   Sq Epi: x / Non Sq Epi: 0-5 /HPF / Bacteria: Present /HPF          Radiology:    < from: Xray Chest 1 View- PORTABLE-Urgent (20 @ 22:19) >  EXAM:  XR CHEST PORTABLE URGENT 1V                          PROCEDURE DATE:  2020          INTERPRETATION:  EXAM: Chest x-ray    CLINICAL HISTORY: Shortness of breath     TECHNIQUE: AP view of the chest    COMPARISON STUDY: Prior study dated 2019    FINDINGS:   The cardiac silhouette is unremarkable. Tortuous, calcified aorta. Hypoinflated lungs. No focal consolidation or pleural effusion. No acute bony abnormality.    IMPRESSION:   No focal consolidation.          Vital Signs Last 24 Hrs  T(C): 36.7 (2020 05:21), Max: 37.2 (2020 21:27)  T(F): 98 (2020 05:21), Max: 99 (2020 21:27)  HR: 76 (2020 05:21) (68 - 76)  BP: 113/61 (2020 05:21) (82/46 - 139/60)  BP(mean): 101 (2020 03:00) (101 - 101)  RR: 18 (2020 05:21) (16 - 20)  SpO2: 95% (2020 03:00) (92% - 95%)    REVIEW OF SYSTEMS: per HPI          Physical Exam:  on C&D isolation precautions    Head: normocephalic, atraumatic    Eyes: PERRLA, EOMI, no nystagmus, sclera anicteric    ENT: nasal discharge, uvula midline, no oropharyngeal erythema/exudate    Neck: supple, negative JVD, negative carotid bruits, no thyromegaly    Chest: CTA bilaterally, neg wheeze/ rhonchi/ rales/ crackles/ egophany    Cardiovascular: regular rate and rhythm, neg murmurs/rubs/gallops    Abdomen: soft, non distended, non tender to palpation in all 4 quadrants, negative rebound/guarding, normal bowel sounds    Extremities:  R foot/ankle dressing, venous stasis changes LE's    Musculoskeletal:      :     Neurologic Exam:    Alert and oriented to person, place, date/year, speech fluent w/o dysarthria, recent and remote memory intact, repetition intact, comprehension intact    Motor Exam:    Upper Extremities:     Right:  no focal weakness    Left:   no focal weakness      Lower Extremities:    Right:   no focal weakness    Left :   no focal weakness                 Sensory:    intact to LT/PP in all UE/LE dermatomes                     DTR:            = biceps/     triceps/     brachioradialis                      = patella/   medial hamstring/ankle                      neg clonus                      neg Babinski                        Gait:  not tested        PM&R Impression:    1) deconditioned  2) no focal weakness    Plan:    1) Physical therapy focusing on therapeutic exercises, bed mobility/transfer out of bed evaluation, progressive ambulation with assistive devices prn.    2) Anticipated Disposition Plan/Recs:     d/c home, home physical therapy , resume 24 hr x 7 day home care

## 2020-07-08 NOTE — H&P ADULT - NSHPPHYSICALEXAM_GEN_ALL_CORE
Vital Signs Last 12 Hrs  T(F): 97.5 (07-08-20 @ 01:00), Max: 99 (07-07-20 @ 21:27)  HR: 71 (07-08-20 @ 01:00) (68 - 73)  BP: 127/69 (07-08-20 @ 00:11) (82/46 - 139/60)  BP(mean): --  RR: 18 (07-08-20 @ 01:00) (18 - 20)  SpO2: 95% (07-08-20 @ 01:00) (92% - 95%)    PHYSICAL EXAM:  Constitutional: elderly female, NAD, comfortable in bed, lying flat, speaking in full sentences  HEENT: NC/AT, PERRLA, EOMI, no conjunctival pallor or scleral icterus, MMM  Neck: Supple, no JVD  Respiratory: CTA B/L. No w/r/r.   Cardiovascular: RRR, normal S1 and S2, no m/r/g.   Gastrointestinal: +BS, soft NTND, no guarding or rebound tenderness, no palpable masses   Extremities: +1 LE edema bilaterally, wwp; no cyanosis, clubbing or edema.   Vascular: Pulses equal and strong throughout.   Neurological: AAOx3, no CN deficits, strength and sensation intact throughout.   Skin:  unstageable right heel ulcer, chronic venous stasis changes Vital Signs Last 12 Hrs  T(F): 97.5 (07-08-20 @ 01:00), Max: 99 (07-07-20 @ 21:27)  HR: 71 (07-08-20 @ 01:00) (68 - 73)  BP: 127/69 (07-08-20 @ 00:11) (82/46 - 139/60)  BP(mean): --  RR: 18 (07-08-20 @ 01:00) (18 - 20)  SpO2: 95% (07-08-20 @ 01:00) (92% - 95%)    PHYSICAL EXAM:  Constitutional: elderly female, NAD, comfortable in bed, lying flat, speaking in full sentences  HEENT: NC/AT, PERRLA, EOMI, no conjunctival pallor or scleral icterus, MMM  Neck: Supple, no JVD  Respiratory: decreased breath sounds bilaterally, no w/r/r  Cardiovascular: RRR, normal S1 and S2, no m/r/g.   Gastrointestinal: +BS, soft NTND, no guarding or rebound tenderness, no palpable masses   Extremities: +1 LE edema bilaterally, wwp; no cyanosis, clubbing or edema.   Vascular: Pulses equal and strong throughout.   Neurological: AAOx3, no CN deficits, strength and sensation intact throughout.   Skin:  unstageable right heel ulcer, chronic venous stasis changes Vital Signs Last 12 Hrs  T(F): 97.5 (07-08-20 @ 01:00), Max: 99 (07-07-20 @ 21:27)  HR: 71 (07-08-20 @ 01:00) (68 - 73)  BP: 127/69 (07-08-20 @ 00:11) (82/46 - 139/60)  BP(mean): --  RR: 18 (07-08-20 @ 01:00) (18 - 20)  SpO2: 95% (07-08-20 @ 01:00) (92% - 95%)    PHYSICAL EXAM:  Constitutional: elderly female, NAD, comfortable in bed, lying flat, speaking in full sentences  HEENT: NC/AT, PERRLA, EOMI, no conjunctival pallor or scleral icterus, MMM  Neck: Supple, no JVD  Respiratory: decreased breath sounds bilaterally, no w/r/r  Cardiovascular: RRR, normal S1 and S2, no m/r/g.   Gastrointestinal: +BS, soft NTND, no guarding or rebound tenderness, no palpable masses   Extremities: Right heel ulcer with a small area of granulating tissue the rest of ulcer covered with gangrenous, erythema, wwp, ulcer with no drainage; no foul odor, mild RLE swelling   Vascular: Pulses equal and strong throughout.   Neurological: AAOx3, no CN deficits, strength and sensation intact throughout.   Skin:  unstageable right heel ulcer, chronic venous stasis changes

## 2020-07-08 NOTE — CONSULT NOTE ADULT - ASSESSMENT
per Internal Medicine    89F PMH of HTN, HLD, CKD, hypothyroid COPD/reactive airway disease, depression, gout presents with lightheadedness, weakness, and diarrhea for the past several days found to be hypotensive with acute on chronic CKD and elevated troponin to 0.03 admitted for treatment of infected right heel ulcer in the setting of hypotension.    Problem/Plan - 1:  ·  Problem: Heel ulcer.  Plan: Pt presenting with [id number] draining right heel ulcer which has been present for 5 months. She has been seeing Dr. Oliva who debrided the ulcer about 1-2 weeks ago with wet to dry dressing with betadine twice a day. On admission was hypotensive but remained afebrile with no leukocytosis. S/p vanc and zosyn in the ED.   - vascular consulted   - LE duplex ultrasound showing no DVT  - f/u foot xray   - continue vancomycin, dose by level in the setting of WAI on CKD  - NPO for possible debridement  - f/u wound cultures.     Problem/Plan - 2:  ·  Problem: Hypotension.  Plan: On admission, patient was hypotensive to 90/50 likely 2/2 hypovolemia from diarrhea, resolved with fluid administration s/p 1.5L NS in the ED. ICU consulted and recommended no need for increased level of care at this time.   - rectal exam in the ED, with dark stools and no juanjose blood, can be consistent with iron supplementation. Hb stable  - orthostatics in the AM   - continue maintenance fluids NS at 100cc/hr.     Problem/Plan - 3:  ·  Problem: Diarrhea, unspecified type.  Plan: - patient with 3 months with black and foul smelling loose stools multiple times per day with decreased PO intake. She does note she takes supplemental iron at home. No recent antibiotic use. On exam, no abdominal pain or tenderness on palpation. As per patient, most recent colonoscopy a few years ago normal.   - low suspicion for infectious etiology given afebrile no leukocytosis  - f/u abdominal xray  - f/u lipase  - if worsening abdominal exam, consider CT abdomen  - monitor for further episodes and send GI PCR if recurs.    Problem/Plan - 4:  ·  Problem: Acute renal failure superimposed on chronic kidney disease, unspecified CKD stage, unspecified acute renal failure type.  Plan: On admission, elevated Cr to 4.84 (baseline Cr 2.5, follows with Dr. Garcia), likely pre-renal with decreased PO intake and elevated BUN, intravascularly depleted   - c/w maintenance fluids at NS 100cc/hr  - urine lytes suggesting postobstructive etiology given history of urinary retention   - f/u renal ultrasound  - holding home antihypertensives  - renally dose medications  - consult renal    #elevated troponin  presenting with elevated troponin 0.03, EKG with NSR and no acute ischemic changes. Patient denies any chest pain or shortness of breath.  - likely secondary to demand ischemia  - now plateaued 0.03 x2.    Problem/Plan - 5:  ·  Problem: Asymptomatic bacteriuria.  Plan: UA on admission positive for bacteria, LE, and nitrites, likely in the setting of frequent straight cath at home. Denies any dysuria, hematuria, or suprapubic pain. S/p zosyn in the ED  - no tx indicated at this time given afebrile, no leukocytosis, and asymptomatic  - f/u urine culture.     Problem/Plan - 6:  Problem: Hypertension. Plan: history of hypertension  - holding home valsartan 320mg daily and lasix 80mg BID given hypotension on admission    #hyperlipidemia  - take Atorvastatin 40mg daily at home.    Problem/Plan - 7:  ·  Problem: Hypothyroidism.  Plan: history of hypothyroidism,   - continue home synthroid 75mcg daily  - TSH wnl  - f/u free T3, T4.     Problem/Plan - 8:  ·  Problem: COPD (chronic obstructive pulmonary disease).  Plan: history of COPD/reactive airway disease. CXR with pulmonary vascular congestion. Lungs with decreased breath sounds bilaterally. Saturating well on room air.  - continue with home albuterol    # depression  history of depression  - continue home duloxetine 20mg daily.     Problem/Plan - 9:  ·  Problem: Gout.  Plan: history of gout, not in any acute flare  - holding colchicine 0.6mg daily in the setting of WAI on CKD  - continue pregabalin 25mg daily (renally dosed)    ADDENDUM: renally dose pregablin     #urinary retention  - patient with chronic retention, straight caths at home  - currently has simpson in, continue to monitor output.    Problem/Plan - 10:  Problem: Prophylactic measure. Plan; F: s/p 1.5L NS in the ED, on maintenance fluids NS @100cc/hr  E: Replete K<4, Mg<2  N: NPO pending possible debridement  DVT PPX: hep subq  Dispo: RMF  Code Status: Full code.

## 2020-07-08 NOTE — H&P ADULT - PROBLEM SELECTOR PLAN 7
history of hypothyroidism,   - continue home synthroid 75mcg daily  - f/u TSH, free T4 history of hypothyroidism,   - continue home synthroid 75mcg daily  - TSH wnl  - f/u free T3, T4

## 2020-07-08 NOTE — H&P ADULT - PROBLEM SELECTOR PLAN 8
history of COPD/reactive airway disease. CXR with pulmonary vascular congestion. Lungs with decreased breath sounds bilaterally. Saturating well on room air.  - continue with home albuterol    # depression  history of depression  - continue home duloxetine 20mg daily

## 2020-07-08 NOTE — H&P ADULT - PROBLEM SELECTOR PLAN 4
On admission, elevated Cr to 4.84 (baseline Cr 2.5, follows with Dr. Garcia), likely pre-renal with decreased PO intake and elevated BUN, intravascularly depleted   - c/w maintenance fluids at NS 100cc/hr  - f/u urine Na and Cr   - holding home antihypertensives On admission, elevated Cr to 4.84 (baseline Cr 2.5, follows with Dr. Garcia), likely pre-renal with decreased PO intake and elevated BUN, intravascularly depleted   - c/w maintenance fluids at NS 100cc/hr  - f/u urine Na and Cr   - holding home antihypertensives    #elevated troponin  presenting with elevated troponin 0.03, EKG with NSR and no acute ischemic changes. Patient denies any chest pain or shortness of breath.  - likely secondary to demand ischemia  - now plateaued 0.03 x2 On admission, elevated Cr to 4.84 (baseline Cr 2.5, follows with Dr. Garcia), likely pre-renal with decreased PO intake and elevated BUN, intravascularly depleted   - c/w maintenance fluids at NS 100cc/hr  - urine lytes suggesting postobstructive etiology given history of urinary retention   - f/u renal ultrasound  - holding home antihypertensives    #elevated troponin  presenting with elevated troponin 0.03, EKG with NSR and no acute ischemic changes. Patient denies any chest pain or shortness of breath.  - likely secondary to demand ischemia  - now plateaued 0.03 x2 On admission, elevated Cr to 4.84 (baseline Cr 2.5, follows with Dr. Garcia), likely pre-renal with decreased PO intake and elevated BUN, intravascularly depleted   - c/w maintenance fluids at NS 100cc/hr  - urine lytes suggesting postobstructive etiology given history of urinary retention   - f/u renal ultrasound  - holding home antihypertensives  - renally dose medications  - consult renal    #elevated troponin  presenting with elevated troponin 0.03, EKG with NSR and no acute ischemic changes. Patient denies any chest pain or shortness of breath.  - likely secondary to demand ischemia  - now plateaued 0.03 x2

## 2020-07-08 NOTE — CHART NOTE - NSCHARTNOTEFT_GEN_A_CORE
Patient seen and examined at bedside. Currently resting comfortably - afebrile, last vitals were stable, not on telemetry. Patient with no acute complaints.     Upon unwrapping the right foot there is a mild malodor coming from the heel. 3x3cm wound with calcaneous grossly exposed, necrotic edges and surrounding eschar. Patient has minimal surrounding erythema, no drainage or juanjose pus from the wound. Patients vascular exam shows palpable pulses from femoral to DP/PT bilaterally.     - patient likely needs PICC for abx due to exposed bone - rec Bone biopsy and podiatry consult   - we will do bedside debridement today   - Zboots for heel offloading while in bed    Final plan pending attending discussion. We will update. Patient seen and examined at bedside. Currently resting comfortably - afebrile, last vitals were stable, not on telemetry. Patient with no acute complaints.     Upon unwrapping the right foot there is a mild malodor coming from the heel. 3x3cm wound with calcaneous grossly exposed, necrotic edges and surrounding eschar. Patient has minimal surrounding erythema, no drainage or juanjose pus from the wound. Patients vascular exam shows palpable pulses from femoral to DP/PT bilaterally.     NO signs of active infection, vascular will continue to do wet to dry with betadine and peroxide daily.   Plan discussed with attending.

## 2020-07-08 NOTE — CONSULT NOTE ADULT - SUBJECTIVE AND OBJECTIVE BOX
Vascular Attending: Dr. Oliva    HPI:  Pt is a 89y Female w/ PMHx of HTN, HLD, CKD, hypothyroid, depression, gout who presented to [facility] ED with lightheadedness, weakness, and diarrhea for the past several days. In addition to weakness and extreme fatigue, she noted that she was hypotensive today at home with systolic BP of 60.  She called her PCP who advised her to come to ED for further evaluation.  At the ED Pt was noted to have infected right heel ulcer. Vascular surgery consulted for further evaluation.  Pt states ulcer has been present for about 5 months. She has been seeing Dr. Oliva who debrided the ulcer about 1-2 weeks ago. She was instructed to do wet to dry dressing with betadine twice a day which she has been doing. She denies fever, chills, chest pain, abdominal pain, dysuria or frequent urination..    PAST MEDICAL & SURGICAL HISTORY:  Urinary retention  Essential hypertension  Other specified hypothyroidism  Chronic kidney disease, stage IV (severe)  History of cholecystectomy  H/O abdominal hysterectomy    Allergic/Immunologic:	    MEDICATIONS  (STANDING):  piperacillin/tazobactam IVPB. 3.375 Gram(s) IV Intermittent Once  sodium chloride 0.9%. 1000 milliLiter(s) (1000 mL/Hr) IV Continuous <Continuous>  vancomycin  IVPB 1000 milliGRAM(s) IV Intermittent once    MEDICATIONS  (PRN):    Allergies    No Known Allergies    Intolerances    SOCIAL HISTORY:    FAMILY HISTORY:  No pertinent family history in first degree relatives    Vital Signs Last 24 Hrs  T(C): 37.2 (2020 21:27), Max: 37.2 (2020 21:27)  T(F): 99 (2020 21:27), Max: 99 (2020 21:27)  HR: 70 (2020 23:12) (68 - 73)  BP: 139/60 (2020 23:12) (82/46 - 139/60)  BP(mean): --  RR: 18 (2020 23:12) (18 - 18)  SpO2: 95% (2020 23:12) (92% - 95%)    PHYSICAL EXAM:  Constitutional: NAD, awake  Respiratory: Unlabored breathing, no respiratory distress  Cardiovascular: RRR  Gastrointestinal: Soft, non distended, nontender  Extremities: Right heel ulcer with a small area of granulating tissue the rest of ulcer covered with gangrenous tissue, right heel erythema, no warmth, ulcer with minimal drainage of brownish discharge; no foul odor, LLE swelling   Vascular: Palpable DP/PT/Pop/Fem    LABS:                        10.9   8.26  )-----------( 291      ( 2020 21:35 )             36.0     07-    134<L>  |  91<L>  |  51<H>  ----------------------------<  129<H>  4.4   |  29  |  4.84<H>    Ca    9.1      2020 21:35  Mg     2.1     -    TPro  6.5  /  Alb  3.3  /  TBili  0.3  /  DBili  x   /  AST  34  /  ALT  19  /  AlkPhos  113  07-07    PT/INR - ( 2020 21:35 )   PT: 11.6 sec;   INR: 0.97     PTT - ( 2020 21:35 )  PTT:32.5 sec  Urinalysis Basic - ( 2020 23:12 )    Color: Yellow / Appearance: SL Cloudy / S.010 / pH: x  Gluc: x / Ketone: NEGATIVE  / Bili: Negative / Urobili: 0.2 E.U./dL   Blood: x / Protein: 30 mg/dL / Nitrite: POSITIVE   Leuk Esterase: Small / RBC: < 5 /HPF / WBC < 5 /HPF   Sq Epi: x / Non Sq Epi: 0-5 /HPF / Bacteria: Present /HPF      RADIOLOGY & ADDITIONAL STUDIES      Assessment/Plan:  Pt is a 89y Female w/ PMHx of HTN, HLD, CKD, hypothyroid, depression, gout who is being admitted for hypotensive with acute on chronic CKD, elevated troponin to 0.03 and UTI. Vascular consulted for right infected right heel ulcer    Please keep NPO for possible debridement  IV antibiotics  F/u LE duplex ultrasound  F/u foot Xray read  F/u AM labs  Rest of plan as per primary team

## 2020-07-08 NOTE — H&P ADULT - PROBLEM SELECTOR PLAN 1
Pt presenting with [id number] draining right heel ulcer which has been present for 5 months. She has been seeing Dr. Oliva who debrided the ulcer about 1-2 weeks ago with wet to dry dressing with betadine twice a day.  - vascular consulted   - f/u LE duplex ultrasound  - f/u foot xray read  - NPO for possible debridement Pt presenting with [id number] draining right heel ulcer which has been present for 5 months. She has been seeing Dr. Oliva who debrided the ulcer about 1-2 weeks ago with wet to dry dressing with betadine twice a day. On admission was hypotensive but remained afebrile with no leukocytosis. S/p vanc and zosyn in the ED.   - vascular consulted   - LE duplex ultrasound showing no DVT  - f/u foot xray   - continue vancomycin, dose by level in the setting of WAI on CKD  - NPO for possible debridement Pt presenting with [id number] draining right heel ulcer which has been present for 5 months. She has been seeing Dr. Oliva who debrided the ulcer about 1-2 weeks ago with wet to dry dressing with betadine twice a day. On admission was hypotensive but remained afebrile with no leukocytosis. S/p vanc and zosyn in the ED.   - vascular consulted   - LE duplex ultrasound showing no DVT  - f/u foot xray   - continue vancomycin, dose by level in the setting of WAI on CKD  - NPO for possible debridement  - f/u wound cultures

## 2020-07-08 NOTE — PROGRESS NOTE ADULT - ASSESSMENT
89 yo F PMHx of HTN, HLD, CKD, hypothyroidism, COPD/reactive airway disease, depression, gout presents with lightheadedness, diarrhea, vomiting for the past week found to be volume depleted with WAI on CKD, admitted for hypotension. 89 yo F PMHx of HTN, HLD, CKD, hypothyroidism, COPD/reactive airway disease, depression, gout presents with lightheadedness, diarrhea, vomiting for the past week and 5 month right heel ulcer found to be hypotensive with WAI on CKD.

## 2020-07-08 NOTE — H&P ADULT - PROBLEM SELECTOR PLAN 9
history of gout, not in any acute flare  - continue colchicine 0.6mg daily    #urinary retention  - patient with chronic retention, straight caths as needed  - currently has simpson in, continue to monitor output history of gout, not in any acute flare  - continue colchicine 0.6mg daily  - continue pregabalin 150mg BID    #urinary retention  - patient with chronic retention, straight caths at home  - currently has simpson in, continue to monitor output history of gout, not in any acute flare  - holding colchicine 0.6mg daily in the setting of WAI  - continue pregabalin 150mg BID    #urinary retention  - patient with chronic retention, straight caths at home  - currently has simpson in, continue to monitor output history of gout, not in any acute flare  - holding colchicine 0.6mg daily in the setting of WAI on CKD  - continue pregabalin 150mg BID    #urinary retention  - patient with chronic retention, straight caths at home  - currently has simpson in, continue to monitor output history of gout, not in any acute flare  - holding colchicine 0.6mg daily in the setting of WAI on CKD  - continue pregabalin 150mg BID    ADDENDUM: renally dose pregablin     #urinary retention  - patient with chronic retention, straight caths at home  - currently has simpson in, continue to monitor output history of gout, not in any acute flare  - holding colchicine 0.6mg daily in the setting of WAI on CKD  - continue pregabalin 25mg daily (renally dosed)    ADDENDUM: renally dose pregablin     #urinary retention  - patient with chronic retention, straight caths at home  - currently has simpson in, continue to monitor output

## 2020-07-08 NOTE — H&P ADULT - PROBLEM SELECTOR PLAN 2
On admission, patient was hypotensive to 90/50 likely 2/2 hypovolemia from diarrhea, resolved with fluid administration s/p 1.5L NS in the ED. ICU consulted and recommended no need for increased level of care at this time.   - would obtain orthostatics and rectal exam given history of dark stools   - continue maintenance fluids NS at 100cc/hr On admission, patient was hypotensive to 90/50 likely 2/2 hypovolemia from diarrhea, resolved with fluid administration s/p 1.5L NS in the ED. ICU consulted and recommended no need for increased level of care at this time.   - rectal exam in the ED, with dark stools and no juanjose blood, can be consistent with iron supplementation. Hb stable  - orthostatics in the AM   - continue maintenance fluids NS at 100cc/hr On admission, patient was hypotensive to 90/50 likely 2/2 hypovolemia from diarrhea, resolved with fluid administration s/p 1.5L NS in the ED. ICU consulted and recommended no need for increased level of care at this time.   - rectal exam in the ED, with dark stools and no juanjose blood, can be consistent with iron supplementation. Hb stable  - orthostatics in the AM   - continue maintenance fluids NS at 100cc/hr    #UTI  UA on admission positive for bacteria, LE, and nitrites, likely in the setting of frequent straight cath at home. Denies any dysuria, hematuria, or suprapubic pain. S/p zosyn in the ED  - f/u urine culture On admission, patient was hypotensive to 90/50 likely 2/2 hypovolemia from diarrhea, resolved with fluid administration s/p 1.5L NS in the ED. ICU consulted and recommended no need for increased level of care at this time.   - rectal exam in the ED, with dark stools and no juanjose blood, can be consistent with iron supplementation. Hb stable  - orthostatics in the AM   - continue maintenance fluids NS at 100cc/hr    #assymptomatic bacteruria  UA on admission positive for bacteria, LE, and nitrites, likely in the setting of frequent straight cath at home. Denies any dysuria, hematuria, or suprapubic pain. S/p zosyn in the ED  - no tx indicated at this time given afebrile, no leukocytosis, and asymptomatic  - f/u urine culture

## 2020-07-08 NOTE — H&P ADULT - PROBLEM SELECTOR PLAN 3
- patient with 3 months with black and foul smelling loose stools multiple times per day with decreased PO intake. She does note she takes supplemental iron at home. No recent antibiotic use. As per patient, most recent colonoscopy wnl   - low suspicion for infectious etiology with lack of fever, wbc elevation  - monitor for further episodes and send GI PCR if recurs - patient with 3 months with black and foul smelling loose stools multiple times per day with decreased PO intake. She does note she takes supplemental iron at home. No recent antibiotic use. As per patient, most recent colonoscopy a few years ago normal.   - low suspicion for infectious etiology given afebrile no leukocytosis  - monitor for further episodes and send GI PCR if recurs - patient with 3 months with black and foul smelling loose stools multiple times per day with decreased PO intake. She does note she takes supplemental iron at home. No recent antibiotic use. On exam, no abdominal pain or tenderness on palpation. As per patient, most recent colonoscopy a few years ago normal.   - low suspicion for infectious etiology given afebrile no leukocytosis  - f/u abdominal xray  - f/u lipase  - if worsening abdominal exam, consider CT abdomen  - monitor for further episodes and send GI PCR if recurs

## 2020-07-08 NOTE — H&P ADULT - ASSESSMENT
89yFemale w/ PMHx of HTN, HLD, CKD, hypothyroid COPD/reactive airway disease, depression, gout presents with lightheadedness, weakness, and diarrhea for the past several days found to be hypotensive with acute on chronic CKD and elevated troponin to 0.03 admitted for treatment of infected right heel ulcer in the setting of hypotension. 89F PMH of HTN, HLD, CKD, hypothyroid COPD/reactive airway disease, depression, gout presents with lightheadedness, weakness, and diarrhea for the past several days found to be hypotensive with acute on chronic CKD and elevated troponin to 0.03 admitted for treatment of infected right heel ulcer in the setting of hypotension.

## 2020-07-08 NOTE — H&P ADULT - ATTENDING COMMENTS
patient seen and examined overnight a/f hypotension in setting of decreased PO intake, n/v x 1 week, chronic diarrhea sxs x 3 months; pt w/ WAI on CKD, along w/ R heel ulcer  reviewed pertinent data, h&p, PE findings as above   1. Right heel ulcer:  on vancomycin, followup level prior to dosing; possible debridement by podiatry   2. hypotension: on IVFs o/n per ICU recs, holding lasix, antihypertensives  3. followup AXR for n/v and diarrhea sxs, currently resolved, would need further imaging / GI evaluation if worsening/ recurring sxs   4. WAI on CKD: holding lasix , antihypertensives    rest of plan as above

## 2020-07-08 NOTE — H&P ADULT - NSICDXPASTMEDICALHX_GEN_ALL_CORE_FT
PAST MEDICAL HISTORY:  Chronic kidney disease, stage IV (severe)     Depression     Essential hypertension     Gout     Other specified hypothyroidism     Urinary retention

## 2020-07-08 NOTE — H&P ADULT - HISTORY OF PRESENT ILLNESS
89F PMH HTN, HLD, CKD, hypothyroid COPD/reactive airway disease, depression, gout presents with lightheadedness, weakness, and diarrhea for the past several months. She has a home health aid at bedside who reports continued dark-colored diarrhea for the 3 months with multiple episodes per day and decreased PO intake. She does note her stools have been black and foul smelling but that she is also on supplemental iron. She also states that she has been experiencing nausea and nonbilious, nonbloody vomiting intermittently for the past week after eating. She was also noted to have decreased blood pressures at home for the past week, reporting systolic blood pressures as low as 70. Of note, patient has infected right heel ulcer. She states ulcer has been present for about 5 months. She has been seeing Dr. Oliva who debrided the ulcer about 1-2 weeks ago. She was instructed to do wet to dry dressing with betadine twice a day which she has been doing. Currently patient denies any fever, chills, chest pain, dizziness, lightheadedness, abdominal pain, dysuria or frequent urination.    In the ED- VS were T99, HR 68-72, BP 90//60 RR 18, SPO2 94%  Labs significant for Hgb 10.9, BUN 51, Cr 4.84, Troponin 0.03, , VBG 7.34 PCO2 60 HCO3 32 EKG: NSR, no acute ischemic findings  CXR showed pulmonary vascular congestion  Given Zosyn IV 3.375g x1, Protonix 80mg, and 1.5L NS 89F PMH HTN, HLD, CKD, hypothyroid COPD/reactive airway disease, depression, gout presents with lightheadedness, weakness, and diarrhea for the past several months. She has a home health aid at bedside who reports continued dark-colored diarrhea for the 3 months with multiple episodes per day and decreased PO intake. She does note her stools have been black and foul smelling but that she is also on supplemental iron. She also states that she has been experiencing nausea and nonbilious, nonbloody vomiting intermittently for the past week after eating. She was also noted to have decreased blood pressures at home for the past week, reporting systolic blood pressures as low as 70. Of note, patient has infected right heel ulcer. She states ulcer has been present for about 5 months. She has been seeing Dr. Oliva who debrided the ulcer about 1-2 weeks ago. She was instructed to do wet to dry dressing with betadine twice a day which she has been doing. Currently patient denies any fever, chills, chest pain, dizziness, lightheadedness, abdominal pain, dysuria or frequent urination.    In the ED- VS were T99, HR 68-72, BP 90//60 RR 18, SPO2 94%  Labs significant for Hgb 10.9, BUN 51, Cr 4.84, Troponin 0.03, , VBG 7.34 PCO2 60 HCO3 32 EKG: NSR, no acute ischemic findings  CXR showed pulmonary vascular congestion  Given Vancomycin IV 1g x1, Zosyn IV 3.375g x1, Protonix 80mg, and 1.5L NS 89F PMH HTN, HLD, CKD, hypothyroid COPD/reactive airway disease, depression, gout presents with lightheadedness, weakness, and diarrhea for the past several months. She has a home health aid at bedside who reports continued dark-colored diarrhea for the 3 months with multiple episodes per day and decreased PO intake. She does note her stools have been black and foul smelling but that she is also on supplemental iron. She also states that she has been experiencing nausea and nonbilious, nonbloody vomiting intermittently for the past week after eating. She was also noted to have decreased blood pressures at home for the past week, reporting systolic blood pressures as low as 70. Of note, patient has infected right heel ulcer. She states ulcer has been present for about 5 months. She has been seeing Dr. Oliva who debrided the ulcer about 1-2 weeks ago. She was instructed to do wet to dry dressing with betadine twice a day which she has been doing. Currently patient denies any fever, chills, chest pain, dizziness, lightheadedness, abdominal pain, dysuria or frequent urination.    In the ED- VS were T99, HR 68-72, BP 90//60 RR 18, SPO2 94% RA  Labs significant for Hgb 10.9, BUN 51, Cr 4.84, Troponin 0.03, , VBG 7.34 PCO2 60 HCO3 32 EKG: NSR, no acute ischemic findings  CXR showed pulmonary vascular congestion  Given Vancomycin IV 1g x1, Zosyn IV 3.375g x1, Protonix 80mg, and 1.5L NS 89F PMH HTN, HLD, CKD, hypothyroid COPD/reactive airway disease, depression, gout presents with lightheadedness, weakness, and diarrhea for the past several months. She has a home health aid at bedside who reports continued dark-colored diarrhea for the 3 months with multiple episodes per day and decreased PO intake. She does note her stools have been black and foul smelling but that she is also on supplemental iron. She also states that she has been experiencing nausea and nonbilious, nonbloody vomiting intermittently for the past week after eating. She was also noted to have decreased blood pressures at home for the past week, reporting systolic blood pressures as low as 70. Of note, patient has infected right heel ulcer. She states ulcer has been present for about 5 months. She has been seeing Dr. Oliva who debrided the ulcer about 1-2 weeks ago. She was instructed to do wet to dry dressing with betadine twice a day which she has been doing. Currently patient denies any fever, chills, chest pain, dizziness, lightheadedness, abdominal pain, dysuria or frequent urination.    In the ED- VS were T99, HR 68-72, BP 90//60 RR 18, SPO2 94% RA  Labs significant for Hgb 10.9, BUN 51, Cr 4.84, Troponin 0.03, , VBG 7.34 PCO2 60 HCO3 32 UA+ EKG: NSR, no acute ischemic findings   CXR showed pulmonary vascular congestion  Given Vancomycin IV 1g x1, Zosyn IV 3.375g x1, Protonix 80mg, and 1.5L NS 89F PMH HTN, HLD, CKD, hypothyroid COPD/reactive airway disease, depression, gout presents with lightheadedness, weakness, and diarrhea for the past several months. She has a home health aid at bedside who reports continued dark-colored diarrhea for the 3 months with multiple episodes per day and decreased PO intake. She does note her stools have been black and foul smelling but that she is also on supplemental iron. She also states that she has been experiencing nausea and nonbilious, nonbloody vomiting intermittently for the past week intermittently after eating. She was also noted to have decreased blood pressures at home for the past week, reporting systolic blood pressures as low as 70. Of note, patient has infected right heel ulcer. She states ulcer has been present for about 5 months. She has been seeing Dr. Oliva who debrided the ulcer about 1-2 weeks ago. She was instructed to do wet to dry dressing with betadine twice a day which she has been doing. Currently patient denies any fever, chills, chest pain, dizziness, lightheadedness, abdominal pain, dysuria or frequent urination.    In the ED- VS were T99, HR 68-72, BP 90//60 RR 18, SPO2 94% RA  Labs significant for Hgb 10.9, BUN 51, Cr 4.84, Troponin 0.03, , VBG 7.34 PCO2 60 HCO3 32 UA+ EKG: NSR, no acute ischemic findings   CXR showed pulmonary vascular congestion  Given Vancomycin IV 1g x1, Zosyn IV 3.375g x1, Protonix 80mg, and 1.5L NS

## 2020-07-08 NOTE — CONSULT NOTE ADULT - ATTENDING COMMENTS
Patient well known to me for about 10 years.  She has chronic bladder dysfunction with frequent urinary tract infections.  Her aide catheterizes the patient about 4 times daily.  The patient has had many UTIs over the years, but none recently.  In the setting of diarrhea, dehydration the patient has developed an acute rise in creatinine from 2.2 (baseline, August 2019) to >4 mg/dl on presentation.  The patient has received iv fluids and is fully alert and conversant.  Urine output is good. Creatinine is improving.      Patient's problems discussed with Dr. Marquez, renal fellow.
diarrhea, acute on chronic kidney disease  dehydration  heel ulcers with possible osteo, evaluated by vascular surg  agree with ivf crystalloid and monitoring of renal funciton  no indication for tele icu at this time

## 2020-07-08 NOTE — PROGRESS NOTE ADULT - PROBLEM SELECTOR PLAN 1
Patient presenting with [id number] right heel ulcer that has been present for 5 months. Managed by Dr. Oliva who debrided ulcer 1-2 weeks ago and consulted patient to do wet to dry dressing with betadine BID. S/p Vanc and zosyn in ED  - Afebrile without any leukocytosis  - Vancomycin 1g IV discontinued on 7/8  - LE duplex U/S: no DVT  - foot xray: fx deformities involving base of proximal phalanx of 2nd and 4th digits with overlying soft tissue swelling. 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

## 2020-07-08 NOTE — PROGRESS NOTE ADULT - SUBJECTIVE AND OBJECTIVE BOX
O/N Events:   Subjective/ROS: Patient seen and examined at bedside. Very tired appearing and would fall asleep during questioning. States she is sleepy this morning. Denies any nausea, vomiting, CP, SOB, diarrhea, dysuria, lower extremity swelling.     VITALS  Vital Signs Last 24 Hrs  T(C): 36.1 (2020 16:36), Max: 37.2 (2020 21:27)  T(F): 97 (2020 16:36), Max: 99 (2020 21:27)  HR: 82 (2020 16:36) (68 - 82)  BP: 102/62 (2020 16:36) (82/46 - 139/60)  BP(mean): 101 (2020 03:00) (101 - 101)  RR: 18 (2020 16:36) (16 - 20)  SpO2: 95% (2020 16:36) (92% - 97%)    CAPILLARY BLOOD GLUCOSE      PHYSICAL EXAM  General: A&Ox2 (self and time), NAD, tired appearing  HEENT: NC/AT, no scleral icterus, MMM  Neck: Supple; no JVD  Respiratory: CTA B/L, no wheezes/crackles   Cardiovascular: RRR; +S1 +S2, systolic murmur noted  Gastrointestinal: Soft, NTND, normoactive BS, no rebound, no guarding, no suprapubic tenderness  Extremities: trace edema from dorsal feet to knee bilaterally; No cyanosis, no clubbing  Neurological:  CNII-XII grossly intact, no obvious focal deficits, reduced sensation in the legs and feet bilaterally, intact sensation in upper extremity    MEDICATIONS  (STANDING):  ALBUTerol    90 MICROgram(s) HFA Inhaler 1 Puff(s) Inhalation four times a day  atorvastatin 40 milliGRAM(s) Oral at bedtime  DULoxetine 20 milliGRAM(s) Oral daily  heparin   Injectable 5000 Unit(s) SubCutaneous every 8 hours  levothyroxine 75 MICROGram(s) Oral daily  pantoprazole    Tablet 40 milliGRAM(s) Oral before breakfast  sodium chloride 0.9%. 1000 milliLiter(s) (70 mL/Hr) IV Continuous <Continuous>    MEDICATIONS  (PRN):      No Known Allergies      LABS                        9.8    8.45  )-----------( 243      ( 2020 06:46 )             32.3     07-08    137  |  99  |  44<H>  ----------------------------<  102<H>  4.2   |  24  |  4.31<H>    Ca    8.1<L>      2020 06:46  Phos  4.9     07-08  Mg     1.9     07-08    TPro  5.4<L>  /  Alb  2.3<L>  /  TBili  0.3  /  DBili  x   /  AST  28  /  ALT  14  /  AlkPhos  88  07-08    PT/INR - ( 2020 21:35 )   PT: 11.6 sec;   INR: 0.97          PTT - ( 2020 21:35 )  PTT:32.5 sec  Urinalysis Basic - ( 2020 23:12 )    Color: Yellow / Appearance: SL Cloudy / S.010 / pH: x  Gluc: x / Ketone: NEGATIVE  / Bili: Negative / Urobili: 0.2 E.U./dL   Blood: x / Protein: 30 mg/dL / Nitrite: POSITIVE   Leuk Esterase: Small / RBC: < 5 /HPF / WBC < 5 /HPF   Sq Epi: x / Non Sq Epi: 0-5 /HPF / Bacteria: Present /HPF      CARDIAC MARKERS ( 2020 01:14 )  x     / 0.03 ng/mL / x     / x     / x      CARDIAC MARKERS ( 2020 21:35 )  x     / 0.03 ng/mL / x     / x     / x            IMAGING/EKG/ETC  EKG:  Xray:  CT:  MRI: O/N Events: Patient admitted from ED overnight.   Subjective/ROS: Patient seen and examined at bedside. Very somnolent appearing and would persistently have to awaken for questioning. Patient denies having any loose stool episodes overnight. Denies any nausea, vomiting, CP, SOB, diarrhea, dysuria, lower extremity swelling.     Additional collateral history obtained from home nurse. States patient is dependent on ADLs and typically bed bound. Requires frequent straight cath every 4 hours. Denies any history of recent travels and sick contacts.     VITALS  Vital Signs Last 24 Hrs  T(C): 36.1 (2020 16:36), Max: 37.2 (2020 21:27)  T(F): 97 (2020 16:36), Max: 99 (2020 21:27)  HR: 82 (2020 16:36) (68 - 82)  BP: 102/62 (2020 16:36) (82/46 - 139/60)  BP(mean): 101 (2020 03:00) (101 - 101)  RR: 18 (2020 16:36) (16 - 20)  SpO2: 95% (2020 16:36) (92% - 97%)    CAPILLARY BLOOD GLUCOSE      PHYSICAL EXAM  General: A&Ox2 (self and time), NAD, tired appearing  HEENT: NC/AT, no scleral icterus, MMM  Neck: Supple; no JVD  Respiratory: CTA B/L, no wheezes/crackles   Cardiovascular: RRR; +S1 +S2, systolic murmur noted  Gastrointestinal: Soft, NTND, normoactive BS, no rebound, no guarding, no suprapubic tenderness  Extremities: trace edema from dorsal feet to knee bilaterally; No cyanosis, no clubbing  Neurological:  CNII-XII grossly intact, no obvious focal deficits, reduced sensation in the legs and feet bilaterally, intact sensation in upper extremity    MEDICATIONS  (STANDING):  ALBUTerol    90 MICROgram(s) HFA Inhaler 1 Puff(s) Inhalation four times a day  atorvastatin 40 milliGRAM(s) Oral at bedtime  DULoxetine 20 milliGRAM(s) Oral daily  heparin   Injectable 5000 Unit(s) SubCutaneous every 8 hours  levothyroxine 75 MICROGram(s) Oral daily  pantoprazole    Tablet 40 milliGRAM(s) Oral before breakfast  sodium chloride 0.9%. 1000 milliLiter(s) (70 mL/Hr) IV Continuous <Continuous>    MEDICATIONS  (PRN):      No Known Allergies      LABS                        9.8    8.45  )-----------( 243      ( 2020 06:46 )             32.3     07-08    137  |  99  |  44<H>  ----------------------------<  102<H>  4.2   |  24  |  4.31<H>    Ca    8.1<L>      2020 06:46  Phos  4.9     07-08  Mg     1.9     07-08    TPro  5.4<L>  /  Alb  2.3<L>  /  TBili  0.3  /  DBili  x   /  AST  28  /  ALT  14  /  AlkPhos  88  07-08    PT/INR - ( 2020 21:35 )   PT: 11.6 sec;   INR: 0.97          PTT - ( 2020 21:35 )  PTT:32.5 sec  Urinalysis Basic - ( 2020 23:12 )    Color: Yellow / Appearance: SL Cloudy / S.010 / pH: x  Gluc: x / Ketone: NEGATIVE  / Bili: Negative / Urobili: 0.2 E.U./dL   Blood: x / Protein: 30 mg/dL / Nitrite: POSITIVE   Leuk Esterase: Small / RBC: < 5 /HPF / WBC < 5 /HPF   Sq Epi: x / Non Sq Epi: 0-5 /HPF / Bacteria: Present /HPF      CARDIAC MARKERS ( 2020 01:14 )  x     / 0.03 ng/mL / x     / x     / x      CARDIAC MARKERS ( 2020 21:35 )  x     / 0.03 ng/mL / x     / x     / x            IMAGING/EKG/ETC  EKG:  Xray:  CT:  MRI: O/N Events: Patient admitted from ED overnight.   Subjective/ROS: Patient seen and examined at bedside. Very somnolent appearing and would persistently have to awaken for questioning. Patient denies having any loose stool episodes overnight. Denies any nausea, vomiting, CP, SOB, diarrhea, dysuria, lower extremity swelling.     Additional collateral history obtained from home nurse. States patient is dependent on ADLs and typically bed bound. Requires frequent straight cath every 4 hours. Denies any history of recent travels and sick contacts.     VITALS  Vital Signs Last 24 Hrs  T(C): 36.1 (2020 16:36), Max: 37.2 (2020 21:27)  T(F): 97 (2020 16:36), Max: 99 (2020 21:27)  HR: 82 (2020 16:36) (68 - 82)  BP: 102/62 (2020 16:36) (82/46 - 139/60)  BP(mean): 101 (2020 03:00) (101 - 101)  RR: 18 (2020 16:36) (16 - 20)  SpO2: 95% (2020 16:36) (92% - 97%)    CAPILLARY BLOOD GLUCOSE      PHYSICAL EXAM  General: A&Ox2 (self and time), NAD, tired appearing  HEENT: NC/AT, no scleral icterus, MMM  Neck: Supple; no JVD  Respiratory: CTA B/L, no wheezes/crackles   Cardiovascular: RRR; +S1 +S2, systolic murmur noted  Gastrointestinal: Soft, NTND, normoactive BS, no rebound, no guarding, no suprapubic tenderness  Extremities: trace edema from dorsal feet to knee bilaterally; No cyanosis, no clubbing  Neurological:  No gross focal deficits, reduced sensation in the legs and feet bilaterally, intact sensation in upper extremity    MEDICATIONS  (STANDING):  ALBUTerol    90 MICROgram(s) HFA Inhaler 1 Puff(s) Inhalation four times a day  atorvastatin 40 milliGRAM(s) Oral at bedtime  DULoxetine 20 milliGRAM(s) Oral daily  heparin   Injectable 5000 Unit(s) SubCutaneous every 8 hours  levothyroxine 75 MICROGram(s) Oral daily  pantoprazole    Tablet 40 milliGRAM(s) Oral before breakfast  sodium chloride 0.9%. 1000 milliLiter(s) (70 mL/Hr) IV Continuous <Continuous>    MEDICATIONS  (PRN):      No Known Allergies      LABS                        9.8    8.45  )-----------( 243      ( 2020 06:46 )             32.3     07-08    137  |  99  |  44<H>  ----------------------------<  102<H>  4.2   |  24  |  4.31<H>    Ca    8.1<L>      2020 06:46  Phos  4.9     07-08  Mg     1.9     07-08    TPro  5.4<L>  /  Alb  2.3<L>  /  TBili  0.3  /  DBili  x   /  AST  28  /  ALT  14  /  AlkPhos  88  07-08    PT/INR - ( 2020 21:35 )   PT: 11.6 sec;   INR: 0.97          PTT - ( 2020 21:35 )  PTT:32.5 sec  Urinalysis Basic - ( 2020 23:12 )    Color: Yellow / Appearance: SL Cloudy / S.010 / pH: x  Gluc: x / Ketone: NEGATIVE  / Bili: Negative / Urobili: 0.2 E.U./dL   Blood: x / Protein: 30 mg/dL / Nitrite: POSITIVE   Leuk Esterase: Small / RBC: < 5 /HPF / WBC < 5 /HPF   Sq Epi: x / Non Sq Epi: 0-5 /HPF / Bacteria: Present /HPF      CARDIAC MARKERS ( 2020 01:14 )  x     / 0.03 ng/mL / x     / x     / x      CARDIAC MARKERS ( 2020 21:35 )  x     / 0.03 ng/mL / x     / x     / x            IMAGING/EKG/ETC  EKG:  Xray:  CT:  MRI:

## 2020-07-08 NOTE — PROGRESS NOTE ADULT - PROBLEM SELECTOR PLAN 2
Patient hypotension to 90/50 on admission likely 2/2 hypovolemia from diarrhea. Currently resolved s/p 1.5L NS in ED and NS maintenance @ 70 cc/hr  -rectal exam in ED demonstrated dark stools no juanjose blood, can be due to iron supplementation  -Hgb stable Patient hypotension to 90/50 on admission likely 2/2 hypovolemia from diarrhea. - Currently resolved s/p 1.5L NS in ED and NS maintenance @ 70 cc/hr.   - rectal exam in ED demonstrated dark stools no juanjose blood, can be due to iron supplementation  -orthostatics when patient less somnolent    #anemia  -Hgb 9.8, .6  -Baseline Hgb 9s-10s from outpatient records  -f/u AM B12 and folate levels    -Hgb at 9.8 today, .6 f/u AM folate and B12 levels Patient hypotension to 90/50 on admission likely 2/2 hypovolemia from diarrhea. - Currently resolved s/p 1.5L NS in ED and NS maintenance @ 70 cc/hr.   - rectal exam in ED demonstrated dark stools no juanjose blood, can be due to iron supplementation  -patient somnolent at bedside and unable to fully answer questions before falling asleep  -ABG sent 7/8 PM, f/u results  -orthostatics when patient less somnolent    #anemia  -Hgb 9.8, .6  -Baseline Hgb 9s-10s from outpatient records, appears chronic  -f/u AM B12 and folate levels    -Hgb at 9.8 today, .6 f/u AM folate and B12 levels Patient hypotension to 90/50 on admission likely 2/2 hypovolemia from diarrhea. - Currently resolved s/p 1.5L NS in ED and NS maintenance @ 70 cc/hr.   - rectal exam in ED demonstrated dark stools no juanjose blood, can be due to iron supplementation  -patient somnolent at bedside and unable to fully answer questions before falling asleep  -stat ABG unremarkable, pH 7.38/pCO2 44/HCO3 25/94% sat  -orthostatics when patient less somnolent    #anemia  -Hgb 9.8, .6  -Baseline Hgb 9s-10s from outpatient records, appears chronic  -f/u AM B12 and folate levels    -Hgb at 9.8 today, .6 f/u AM folate and B12 levels

## 2020-07-08 NOTE — H&P ADULT - NSHPLABSRESULTS_GEN_ALL_CORE
LABS:                         10.9   8.26  )-----------( 291      ( 2020 21:35 )             36.0     07-    134<L>  |  91<L>  |  51<H>  ----------------------------<  129<H>  4.4   |  29  |  4.84<H>    Ca    9.1      2020 21:35  Mg     2.1         TPro  6.5  /  Alb  3.3  /  TBili  0.3  /  DBili  x   /  AST  34  /  ALT  19  /  AlkPhos  113  -    PT/INR - ( 2020 21:35 )   PT: 11.6 sec;   INR: 0.97          PTT - ( 2020 21:35 )  PTT:32.5 sec  Urinalysis Basic - ( 2020 23:12 )    Color: Yellow / Appearance: SL Cloudy / S.010 / pH: x  Gluc: x / Ketone: NEGATIVE  / Bili: Negative / Urobili: 0.2 E.U./dL   Blood: x / Protein: 30 mg/dL / Nitrite: POSITIVE   Leuk Esterase: Small / RBC: < 5 /HPF / WBC < 5 /HPF   Sq Epi: x / Non Sq Epi: 0-5 /HPF / Bacteria: Present /HPF      CARDIAC MARKERS ( 2020 01:14 )  x     / 0.03 ng/mL / x     / x     / x      CARDIAC MARKERS ( 2020 21:35 )  x     / 0.03 ng/mL / x     / x     / x          Serum Pro-Brain Natriuretic Peptide: 754 pg/mL ( @ 21:35)    Lactate, Blood: 2.0 mmol/L ( @ 21:35)      RADIOLOGY, EKG & ADDITIONAL TESTS:

## 2020-07-08 NOTE — CONSULT NOTE ADULT - SUBJECTIVE AND OBJECTIVE BOX
Patient is a 89y old  Female who presents with a chief complaint of hypotension and right heel ulcer (2020 09:07)      HPI:  89F PMH HTN, HLD, CKD, hypothyroid COPD/reactive airway disease, depression, gout presents with lightheadedness, weakness, and diarrhea for the past several months. She has a home health aid at bedside who reports continued dark-colored diarrhea for the 3 months with multiple episodes per day and decreased PO intake. She does note her stools have been black and foul smelling but that she is also on supplemental iron. She also states that she has been experiencing nausea and nonbilious, nonbloody vomiting intermittently for the past week intermittently after eating. She was also noted to have decreased blood pressures at home for the past week, reporting systolic blood pressures as low as 70. Of note, patient has infected right heel ulcer. She states ulcer has been present for about 5 months. She has been seeing Dr. Oliva who debrided the ulcer about 1-2 weeks ago. She was instructed to do wet to dry dressing with betadine twice a day which she has been doing. Currently patient denies any fever, chills, chest pain, dizziness, lightheadedness, abdominal pain, dysuria or frequent urination.    In the ED- VS were T99, HR 68-72, BP 90//60 RR 18, SPO2 94% RA  Labs significant for Hgb 10.9, BUN 51, Cr 4.84, Troponin 0.03, , VBG 7.34 PCO2 60 HCO3 32 UA+ EKG: NSR, no acute ischemic findings   CXR showed pulmonary vascular congestion  Given Vancomycin IV 1g x1, Zosyn IV 3.375g x1, Protonix 80mg, and 1.5L NS (2020 02:03)      PAST MEDICAL & SURGICAL HISTORY:  Gout  Depression  Urinary retention  Essential hypertension  Other specified hypothyroidism  Chronic kidney disease, stage IV (severe)  History of cholecystectomy  H/O abdominal hysterectomy        Allergies:  No Known Allergies      Home Medications:   ALBUTerol    90 MICROgram(s) HFA Inhaler 1 Puff(s) Inhalation four times a day  atorvastatin 40 milliGRAM(s) Oral at bedtime  DULoxetine 20 milliGRAM(s) Oral daily  heparin   Injectable 5000 Unit(s) SubCutaneous every 8 hours  levothyroxine 75 MICROGram(s) Oral daily  pantoprazole    Tablet 40 milliGRAM(s) Oral before breakfast  sodium chloride 0.9%. 1000 milliLiter(s) IV Continuous <Continuous>      Hospital Medications:   MEDICATIONS  (STANDING):  ALBUTerol    90 MICROgram(s) HFA Inhaler 1 Puff(s) Inhalation four times a day  atorvastatin 40 milliGRAM(s) Oral at bedtime  DULoxetine 20 milliGRAM(s) Oral daily  heparin   Injectable 5000 Unit(s) SubCutaneous every 8 hours  levothyroxine 75 MICROGram(s) Oral daily  pantoprazole    Tablet 40 milliGRAM(s) Oral before breakfast  sodium chloride 0.9%. 1000 milliLiter(s) (100 mL/Hr) IV Continuous <Continuous>      SOCIAL HISTORY:  Denies ETOh, Smoking,     Family History:  FAMILY HISTORY:  No pertinent family history in first degree relatives      ROS:  CONSTITUTIONAL: No fever or chills.  HEENT: No headche, visual disturbances, hearing impairment.  RESPIRATORY: No shortness of breath, cough, wheezing or hemoptysis  CARDIOVASCULAR: No Chest pain, shortness of breath, palpitations, dizziness, syncope, orthopnea, PND or leg swelling.  GASTROINTESTINAL: No abdominal pain, nausea, vomiting, diarrhea, hematemesis or blood per rectum.  GENITOURINARY: No urinary frequency, urgency, gross hematuria, dysuria, foamy urine, flank or supra pubic pain, no prior history of kidney stones.  NEUROLOGICAL: No headache,  focal limb weakness, tingling or numbness sensation or seizure like activity  SKIN: No rash or skin lesion  MUSCULOSKELETAL: No leg pain, calf pain or swelling  Psych: Denies suicidal or homicidal ideation    VITALS:  T(F): 97.5 (20 @ 10:50), Max: 99 (20 @ 21:27)  HR: 68 (20 @ 10:50)  BP: 114/63 (20 @ 10:50)  RR: 18 (20 @ 10:50)  SpO2: 97% (20 @ 10:50)  Wt(kg): --     @ 07:01  -   @ 07:00  --------------------------------------------------------  IN: 400 mL / OUT: 590 mL / NET: -190 mL      Height (cm): 160 ( @ 03:00)  Weight (kg): 63.5 ( @ 03:00)  BMI (kg/m2): 24.8 ( @ 03:00)  BSA (m2): 1.66 ( @ 03:00)  CAPILLARY BLOOD GLUCOSE          	PHYSICAL EXAM:  	Constitutional: elderly female, NAD, comfortable in bed  	Neck: Supple, no JVD  	Respiratory: crackles to the bases bilaterally   	Cardiovascular: RRR, normal S1 and S2, no m/r/g.   	Gastrointestinal: +BS, soft NTND   	Extremities: Right heel ulcer wrapped   	Neurological: Alert w/o gross deficits     LABS:      137  |  99  |  44<H>  ----------------------------<  102<H>  4.2   |  24  |  4.31<H>    Ca    8.1<L>      2020 06:46  Phos  4.9       Mg     1.9         TPro  5.4<L>  /  Alb  2.3<L>  /  TBili  0.3  /  DBili      /  AST  28  /  ALT  14  /  AlkPhos  88      Creatinine Trend: 4.31 <--, 4.84 <--                        9.8    8.45  )-----------( 243      ( 2020 06:46 )             32.3     Urine Studies:  Urinalysis Basic - ( 2020 23:12 )    Color: Yellow / Appearance: SL Cloudy / S.010 / pH:   Gluc:  / Ketone: NEGATIVE  / Bili: Negative / Urobili: 0.2 E.U./dL   Blood:  / Protein: 30 mg/dL / Nitrite: POSITIVE   Leuk Esterase: Small / RBC: < 5 /HPF / WBC < 5 /HPF   Sq Epi:  / Non Sq Epi: 0-5 /HPF / Bacteria: Present /HPF      Sodium, Random Urine: 28 mmol/L ( @ 04:19)  Creatinine, Random Urine: 17 mg/dL ( @ 04:19)      RADIOLOGY & ADDITIONAL STUDIES:    EKG findings reviewed.    Imaging Personally Reviewed:  [x] YES  [ ] NO    Consultant(s) and primary physician Notes Reviewed:  [x] YES  [ ] NO    Care Discussed with Primary team/ Consultants/Other Providers [x] YES  [ ] NO        Assessment/Plan:     #nonoliguric WAI 2/2 dehydration vs less likely obstruction   - electrolyte and volume status noted  - no absolute indication for hemodialysis   - keep MAP > 65   - monitor urine output   - UOP 590cc/24 hr   - pt w/simpson   - UA positive but asymptomatic and straight cath hx, likely asymptomatic bacteruria    - Cr 4.84, baseline Cr ~2.2   - known CKD Stage V   - Urine Na 28; Ur Cr 17   - FENa 5.2%   - recommend renal sono/bladder scan  - avoid ACE/ARB/NSAIDS and other nephrotoxic medications   - renal dosing of antibiotics   - repeat UA and urine lytes tomorrow AM   - recommend CPK level   - renal diet     Thank you for the opportunity to participate in the care of your patient. The nephrology service remains available to assist with any questions or concerns. Please feel free to reach us by paging the on-call nephrology fellow for urgent issues or as below.     Pierre Marquez M.D.   PGY-4  [phone] Patient is a 89y old  Female who presents with a chief complaint of hypotension and right heel ulcer (2020 09:07)      HPI:  89F PMH HTN, HLD, CKD, hypothyroid COPD/reactive airway disease, depression, gout presents with lightheadedness, weakness, and diarrhea for the past several months. She has a home health aid at bedside who reports continued dark-colored diarrhea for the 3 months with multiple episodes per day and decreased PO intake. She does note her stools have been black and foul smelling but that she is also on supplemental iron. She also states that she has been experiencing nausea and nonbilious, nonbloody vomiting intermittently for the past week intermittently after eating. She was also noted to have decreased blood pressures at home for the past week, reporting systolic blood pressures as low as 70. Of note, patient has infected right heel ulcer. She states ulcer has been present for about 5 months. She has been seeing Dr. Oliva who debrided the ulcer about 1-2 weeks ago. She was instructed to do wet to dry dressing with betadine twice a day which she has been doing. Currently patient denies any fever, chills, chest pain, dizziness, lightheadedness, abdominal pain, dysuria or frequent urination.    In the ED- VS were T99, HR 68-72, BP 90//60 RR 18, SPO2 94% RA  Labs significant for Hgb 10.9, BUN 51, Cr 4.84, Troponin 0.03, , VBG 7.34 PCO2 60 HCO3 32 UA+ EKG: NSR, no acute ischemic findings   CXR showed pulmonary vascular congestion  Given Vancomycin IV 1g x1, Zosyn IV 3.375g x1, Protonix 80mg, and 1.5L NS (2020 02:03)      PAST MEDICAL & SURGICAL HISTORY:  Gout  Depression  Urinary retention  Essential hypertension  Other specified hypothyroidism  Chronic kidney disease, stage IV (severe)  History of cholecystectomy  H/O abdominal hysterectomy        Allergies:  No Known Allergies      Home Medications:   ALBUTerol    90 MICROgram(s) HFA Inhaler 1 Puff(s) Inhalation four times a day  atorvastatin 40 milliGRAM(s) Oral at bedtime  DULoxetine 20 milliGRAM(s) Oral daily  heparin   Injectable 5000 Unit(s) SubCutaneous every 8 hours  levothyroxine 75 MICROGram(s) Oral daily  pantoprazole    Tablet 40 milliGRAM(s) Oral before breakfast  sodium chloride 0.9%. 1000 milliLiter(s) IV Continuous <Continuous>      Hospital Medications:   MEDICATIONS  (STANDING):  ALBUTerol    90 MICROgram(s) HFA Inhaler 1 Puff(s) Inhalation four times a day  atorvastatin 40 milliGRAM(s) Oral at bedtime  DULoxetine 20 milliGRAM(s) Oral daily  heparin   Injectable 5000 Unit(s) SubCutaneous every 8 hours  levothyroxine 75 MICROGram(s) Oral daily  pantoprazole    Tablet 40 milliGRAM(s) Oral before breakfast  sodium chloride 0.9%. 1000 milliLiter(s) (100 mL/Hr) IV Continuous <Continuous>      SOCIAL HISTORY:  Denies ETOh, Smoking,     Family History:  FAMILY HISTORY:  No pertinent family history in first degree relatives      ROS:  CONSTITUTIONAL: No fever or chills.  HEENT: No headche, visual disturbances, hearing impairment.  RESPIRATORY: No shortness of breath, cough, wheezing or hemoptysis  CARDIOVASCULAR: No Chest pain, shortness of breath, palpitations, dizziness, syncope, orthopnea, PND or leg swelling.  GASTROINTESTINAL: No abdominal pain, nausea, vomiting, diarrhea, hematemesis or blood per rectum.  GENITOURINARY: No urinary frequency, urgency, gross hematuria, dysuria, foamy urine, flank or supra pubic pain, no prior history of kidney stones.  NEUROLOGICAL: No headache,  focal limb weakness, tingling or numbness sensation or seizure like activity  SKIN: No rash or skin lesion  MUSCULOSKELETAL: No leg pain, calf pain or swelling  Psych: Denies suicidal or homicidal ideation    VITALS:  T(F): 97.5 (20 @ 10:50), Max: 99 (20 @ 21:27)  HR: 68 (20 @ 10:50)  BP: 114/63 (20 @ 10:50)  RR: 18 (20 @ 10:50)  SpO2: 97% (20 @ 10:50)  Wt(kg): --     @ 07:01  -   @ 07:00  --------------------------------------------------------  IN: 400 mL / OUT: 590 mL / NET: -190 mL      Height (cm): 160 ( @ 03:00)  Weight (kg): 63.5 ( @ 03:00)  BMI (kg/m2): 24.8 ( @ 03:00)  BSA (m2): 1.66 ( @ 03:00)  CAPILLARY BLOOD GLUCOSE          	PHYSICAL EXAM:  	Constitutional: elderly female, NAD, comfortable in bed  	Neck: Supple, no JVD  	Respiratory: crackles to the bases bilaterally   	Cardiovascular: RRR, normal S1 and S2   	Gastrointestinal: +BS, soft NTND   	Extremities: Right heel ulcer wrapped, 1+ pitting edema L > R   	Neurological: Alert w/o gross deficits     LABS:      137  |  99  |  44<H>  ----------------------------<  102<H>  4.2   |  24  |  4.31<H>    Ca    8.1<L>      2020 06:46  Phos  4.9       Mg     1.9         TPro  5.4<L>  /  Alb  2.3<L>  /  TBili  0.3  /  DBili      /  AST  28  /  ALT  14  /  AlkPhos  88      Creatinine Trend: 4.31 <--, 4.84 <--                        9.8    8.45  )-----------( 243      ( 2020 06:46 )             32.3     Urine Studies:  Urinalysis Basic - ( 2020 23:12 )    Color: Yellow / Appearance: SL Cloudy / S.010 / pH:   Gluc:  / Ketone: NEGATIVE  / Bili: Negative / Urobili: 0.2 E.U./dL   Blood:  / Protein: 30 mg/dL / Nitrite: POSITIVE   Leuk Esterase: Small / RBC: < 5 /HPF / WBC < 5 /HPF   Sq Epi:  / Non Sq Epi: 0-5 /HPF / Bacteria: Present /HPF      Sodium, Random Urine: 28 mmol/L ( @ 04:19)  Creatinine, Random Urine: 17 mg/dL ( @ 04:19)      RADIOLOGY & ADDITIONAL STUDIES:    EKG findings reviewed.    Imaging Personally Reviewed:  [x] YES  [ ] NO    Consultant(s) and primary physician Notes Reviewed:  [x] YES  [ ] NO    Care Discussed with Primary team/ Consultants/Other Providers [x] YES  [ ] NO    89F PMH HTN, HLD, CKD Stage V, hypothyroid COPD/reactive airway disease, depression, gout presents with lightheadedness, weakness, and diarrhea, found to be hypotensive; consult for WAI.     Assessment/Plan:     #nonoliguric WAI   - likely cause is pre-renal/dehydration vs ATN 2/2 hypotension vs less likely obstruction   - electrolyte and volume status noted   - no absolute indication for hemodialysis   - keep MAP > 65   - monitor urine output   - UOP 590cc/24 hr   - pt w/simpson   - UA positive but asymptomatic and straight cath hx, likely asymptomatic bacteruria    - Cr 4.84, baseline Cr ~2.2   - known CKD Stage V   - Urine Na 28; Ur Cr 17   - FENa 5.2% but on lasix   - denies NSAID use   - avoid ACE/ARB/NSAIDS and other nephrotoxic medications   - renal dosing of antibiotics   - recommend renal sono/bladder scan   - repeat BMP today   - repeat BMP, UA, and urine lytes (including urea) tomorrow AM, recheck FENa   - recommend CPK level r/o gabapentin-induced rhabdomyolysis   - renal diet     Thank you for the opportunity to participate in the care of your patient. The nephrology service remains available to assist with any questions or concerns. Please feel free to reach us by paging the on-call nephrology fellow for urgent issues or as below.     Pierre Marquez M.D.   PGY-4  [phone] Patient is a 89y old  Female who presents with a chief complaint of hypotension and right heel ulcer (2020 09:07)      HPI:  89F PMH HTN, HLD, CKD, hypothyroid COPD/reactive airway disease, depression, gout presents with lightheadedness, weakness, and diarrhea for the past several months. She has a home health aid at bedside who reports continued dark-colored diarrhea for the 3 months with multiple episodes per day and decreased PO intake. She does note her stools have been black and foul smelling but that she is also on supplemental iron. She also states that she has been experiencing nausea and nonbilious, nonbloody vomiting intermittently for the past week intermittently after eating. She was also noted to have decreased blood pressures at home for the past week, reporting systolic blood pressures as low as 70. Of note, patient has infected right heel ulcer. She states ulcer has been present for about 5 months. She has been seeing Dr. Oliva who debrided the ulcer about 1-2 weeks ago. She was instructed to do wet to dry dressing with betadine twice a day which she has been doing. Currently patient denies any fever, chills, chest pain, dizziness, lightheadedness, abdominal pain, dysuria or frequent urination.    In the ED- VS were T99, HR 68-72, BP 90//60 RR 18, SPO2 94% RA  Labs significant for Hgb 10.9, BUN 51, Cr 4.84, Troponin 0.03, , VBG 7.34 PCO2 60 HCO3 32 UA+ EKG: NSR, no acute ischemic findings   CXR showed pulmonary vascular congestion  Given Vancomycin IV 1g x1, Zosyn IV 3.375g x1, Protonix 80mg, and 1.5L NS (2020 02:03)      PAST MEDICAL & SURGICAL HISTORY:  Gout  Depression  Urinary retention  Essential hypertension  Other specified hypothyroidism  Chronic kidney disease, stage IV (severe)  History of cholecystectomy  H/O abdominal hysterectomy        Allergies:  No Known Allergies      Home Medications:   ALBUTerol    90 MICROgram(s) HFA Inhaler 1 Puff(s) Inhalation four times a day  atorvastatin 40 milliGRAM(s) Oral at bedtime  DULoxetine 20 milliGRAM(s) Oral daily  heparin   Injectable 5000 Unit(s) SubCutaneous every 8 hours  levothyroxine 75 MICROGram(s) Oral daily  pantoprazole    Tablet 40 milliGRAM(s) Oral before breakfast  sodium chloride 0.9%. 1000 milliLiter(s) IV Continuous <Continuous>      Hospital Medications:   MEDICATIONS  (STANDING):  ALBUTerol    90 MICROgram(s) HFA Inhaler 1 Puff(s) Inhalation four times a day  atorvastatin 40 milliGRAM(s) Oral at bedtime  DULoxetine 20 milliGRAM(s) Oral daily  heparin   Injectable 5000 Unit(s) SubCutaneous every 8 hours  levothyroxine 75 MICROGram(s) Oral daily  pantoprazole    Tablet 40 milliGRAM(s) Oral before breakfast  sodium chloride 0.9%. 1000 milliLiter(s) (100 mL/Hr) IV Continuous <Continuous>      SOCIAL HISTORY:  Denies ETOh, Smoking,     Family History:  FAMILY HISTORY:  No pertinent family history in first degree relatives      ROS:  CONSTITUTIONAL: No fever or chills.  HEENT: No headche, visual disturbances, hearing impairment.  RESPIRATORY: No shortness of breath, cough, wheezing or hemoptysis  CARDIOVASCULAR: No Chest pain, shortness of breath, palpitations, dizziness, syncope, orthopnea, PND or leg swelling.  GASTROINTESTINAL: No abdominal pain, nausea, vomiting, diarrhea, hematemesis or blood per rectum.  GENITOURINARY: No urinary frequency, urgency, gross hematuria, dysuria, foamy urine, flank or supra pubic pain, no prior history of kidney stones.  NEUROLOGICAL: No headache,  focal limb weakness, tingling or numbness sensation or seizure like activity  SKIN: No rash or skin lesion  MUSCULOSKELETAL: No leg pain, calf pain or swelling  Psych: Denies suicidal or homicidal ideation    VITALS:  T(F): 97.5 (20 @ 10:50), Max: 99 (20 @ 21:27)  HR: 68 (20 @ 10:50)  BP: 114/63 (20 @ 10:50)  RR: 18 (20 @ 10:50)  SpO2: 97% (20 @ 10:50)  Wt(kg): --     @ 07:01  -   @ 07:00  --------------------------------------------------------  IN: 400 mL / OUT: 590 mL / NET: -190 mL      Height (cm): 160 ( @ 03:00)  Weight (kg): 63.5 ( @ 03:00)  BMI (kg/m2): 24.8 ( @ 03:00)  BSA (m2): 1.66 ( @ 03:00)  CAPILLARY BLOOD GLUCOSE          	PHYSICAL EXAM:  	Constitutional: elderly female, NAD, comfortable in bed  	Neck: Supple, no JVD  	Respiratory: crackles to the bases bilaterally   	Cardiovascular: RRR, normal S1 and S2   	Gastrointestinal: +BS, soft NTND   	Extremities: Right heel ulcer wrapped, 1+ pitting edema L > R   	Neurological: Alert w/o gross deficits     LABS:      137  |  99  |  44<H>  ----------------------------<  102<H>  4.2   |  24  |  4.31<H>    Ca    8.1<L>      2020 06:46  Phos  4.9       Mg     1.9         TPro  5.4<L>  /  Alb  2.3<L>  /  TBili  0.3  /  DBili      /  AST  28  /  ALT  14  /  AlkPhos  88      Creatinine Trend: 4.31 <--, 4.84 <--                        9.8    8.45  )-----------( 243      ( 2020 06:46 )             32.3     Urine Studies:  Urinalysis Basic - ( 2020 23:12 )    Color: Yellow / Appearance: SL Cloudy / S.010 / pH:   Gluc:  / Ketone: NEGATIVE  / Bili: Negative / Urobili: 0.2 E.U./dL   Blood:  / Protein: 30 mg/dL / Nitrite: POSITIVE   Leuk Esterase: Small / RBC: < 5 /HPF / WBC < 5 /HPF   Sq Epi:  / Non Sq Epi: 0-5 /HPF / Bacteria: Present /HPF      Sodium, Random Urine: 28 mmol/L ( @ 04:19)  Creatinine, Random Urine: 17 mg/dL ( @ 04:19)      RADIOLOGY & ADDITIONAL STUDIES:    EKG findings reviewed.    Imaging Personally Reviewed:  [x] YES  [ ] NO    Consultant(s) and primary physician Notes Reviewed:  [x] YES  [ ] NO    Care Discussed with Primary team/ Consultants/Other Providers [x] YES  [ ] NO    89F PMH HTN, HLD, CKD Stage V, hypothyroid COPD/reactive airway disease, depression, gout presents with lightheadedness, weakness, and diarrhea, found to be hypotensive; consult for WAI.     Assessment/Plan:     #nonoliguric WAI on CKD Stage V   - likely cause is pre-renal/dehydration vs ischemic ATN 2/2 hypotension vs toxic ATN 2/2 rhabdo vs less likely obstruction   - electrolyte and volume status noted   - no absolute indication for hemodialysis   - keep MAP > 65   - monitor urine output   - UOP 590cc/24 hr   - pt w/simpson   - UA positive but asymptomatic and straight cath hx, likely asymptomatic bacteruria    - Cr 4.84, baseline Cr ~2.2   - known CKD Stage V   - Urine Na 28; Ur Cr 17   - FENa 5.2% but on lasix   - denies NSAID use   - avoid ACE/ARB/NSAIDS and other nephrotoxic medications   - renal dosing of antibiotics   - recommend saline flushes of urinary catheter   - consider renal sono   - repeat BMP today   - repeat BMP, UA, and urine lytes (including urea) tomorrow AM, check FEUrea    - recommend CPK level r/o gabapentin-induced rhabdomyolysis   - renal diet     Thank you for the opportunity to participate in the care of your patient. The nephrology service remains available to assist with any questions or concerns. Please feel free to reach us by paging the on-call nephrology fellow for urgent issues or as below.     Pierre Marquez M.D.   PGY-4  [phone] Patient is a 89y old  Female who presents with a chief complaint of hypotension and right heel ulcer (2020 09:07)      HPI:  89F PMH HTN, HLD, CKD, hypothyroid COPD/reactive airway disease, depression, gout presents with lightheadedness, weakness, and diarrhea for the past several months. She has a home health aid at bedside who reports continued dark-colored diarrhea for the 3 months with multiple episodes per day and decreased PO intake. She does note her stools have been black and foul smelling but that she is also on supplemental iron. She also states that she has been experiencing nausea and nonbilious, nonbloody vomiting intermittently for the past week intermittently after eating. She was also noted to have decreased blood pressures at home for the past week, reporting systolic blood pressures as low as 70. Of note, patient has infected right heel ulcer. She states ulcer has been present for about 5 months. She has been seeing Dr. Oliva who debrided the ulcer about 1-2 weeks ago. She was instructed to do wet to dry dressing with betadine twice a day which she has been doing. Currently patient denies any fever, chills, chest pain, dizziness, lightheadedness, abdominal pain, dysuria or frequent urination.    In the ED- VS were T99, HR 68-72, BP 90//60 RR 18, SPO2 94% RA  Labs significant for Hgb 10.9, BUN 51, Cr 4.84, Troponin 0.03, , VBG 7.34 PCO2 60 HCO3 32 UA+ EKG: NSR, no acute ischemic findings   CXR showed pulmonary vascular congestion  Given Vancomycin IV 1g x1, Zosyn IV 3.375g x1, Protonix 80mg, and 1.5L NS (2020 02:03)      PAST MEDICAL & SURGICAL HISTORY:  Gout  Depression  Urinary retention  Essential hypertension  Other specified hypothyroidism  Chronic kidney disease, stage IV (severe)  History of cholecystectomy  H/O abdominal hysterectomy        Allergies:  No Known Allergies      Home Medications:   ALBUTerol    90 MICROgram(s) HFA Inhaler 1 Puff(s) Inhalation four times a day  atorvastatin 40 milliGRAM(s) Oral at bedtime  DULoxetine 20 milliGRAM(s) Oral daily  heparin   Injectable 5000 Unit(s) SubCutaneous every 8 hours  levothyroxine 75 MICROGram(s) Oral daily  pantoprazole    Tablet 40 milliGRAM(s) Oral before breakfast  sodium chloride 0.9%. 1000 milliLiter(s) IV Continuous <Continuous>      Hospital Medications:   MEDICATIONS  (STANDING):  ALBUTerol    90 MICROgram(s) HFA Inhaler 1 Puff(s) Inhalation four times a day  atorvastatin 40 milliGRAM(s) Oral at bedtime  DULoxetine 20 milliGRAM(s) Oral daily  heparin   Injectable 5000 Unit(s) SubCutaneous every 8 hours  levothyroxine 75 MICROGram(s) Oral daily  pantoprazole    Tablet 40 milliGRAM(s) Oral before breakfast  sodium chloride 0.9%. 1000 milliLiter(s) (100 mL/Hr) IV Continuous <Continuous>      SOCIAL HISTORY:  Denies ETOh, Smoking,     Family History:  FAMILY HISTORY:  No pertinent family history in first degree relatives      ROS:  CONSTITUTIONAL: No fever or chills.  HEENT: No headche, visual disturbances, hearing impairment.  RESPIRATORY: No shortness of breath, cough, wheezing or hemoptysis  CARDIOVASCULAR: No Chest pain, shortness of breath, palpitations, dizziness, syncope, orthopnea, PND or leg swelling.  GASTROINTESTINAL: No abdominal pain, nausea, vomiting, diarrhea, hematemesis or blood per rectum.  GENITOURINARY: No urinary frequency, urgency, gross hematuria, dysuria, foamy urine, flank or supra pubic pain, no prior history of kidney stones.  NEUROLOGICAL: No headache,  focal limb weakness, tingling or numbness sensation or seizure like activity  SKIN: No rash or skin lesion  MUSCULOSKELETAL: No leg pain, calf pain or swelling  Psych: Denies suicidal or homicidal ideation    VITALS:  T(F): 97.5 (20 @ 10:50), Max: 99 (20 @ 21:27)  HR: 68 (20 @ 10:50)  BP: 114/63 (20 @ 10:50)  RR: 18 (20 @ 10:50)  SpO2: 97% (20 @ 10:50)  Wt(kg): --     @ 07:01  -   @ 07:00  --------------------------------------------------------  IN: 400 mL / OUT: 590 mL / NET: -190 mL      Height (cm): 160 ( @ 03:00)  Weight (kg): 63.5 ( @ 03:00)  BMI (kg/m2): 24.8 ( @ 03:00)  BSA (m2): 1.66 ( @ 03:00)  CAPILLARY BLOOD GLUCOSE          	PHYSICAL EXAM:  	Constitutional: elderly female, NAD, comfortable in bed  	Neck: Supple, no JVD  	Respiratory: crackles to the bases bilaterally   	Cardiovascular: RRR, normal S1 and S2   	Gastrointestinal: +BS, soft NTND   	Extremities: Right heel ulcer wrapped, 1+ pitting edema L > R   	Neurological: Alert w/o gross deficits     LABS:      137  |  99  |  44<H>  ----------------------------<  102<H>  4.2   |  24  |  4.31<H>    Ca    8.1<L>      2020 06:46  Phos  4.9       Mg     1.9         TPro  5.4<L>  /  Alb  2.3<L>  /  TBili  0.3  /  DBili      /  AST  28  /  ALT  14  /  AlkPhos  88      Creatinine Trend: 4.31 <--, 4.84 <--                        9.8    8.45  )-----------( 243      ( 2020 06:46 )             32.3     Urine Studies:  Urinalysis Basic - ( 2020 23:12 )    Color: Yellow / Appearance: SL Cloudy / S.010 / pH:   Gluc:  / Ketone: NEGATIVE  / Bili: Negative / Urobili: 0.2 E.U./dL   Blood:  / Protein: 30 mg/dL / Nitrite: POSITIVE   Leuk Esterase: Small / RBC: < 5 /HPF / WBC < 5 /HPF   Sq Epi:  / Non Sq Epi: 0-5 /HPF / Bacteria: Present /HPF      Sodium, Random Urine: 28 mmol/L ( @ 04:19)  Creatinine, Random Urine: 17 mg/dL ( @ 04:19)      RADIOLOGY & ADDITIONAL STUDIES:    EKG findings reviewed.    Imaging Personally Reviewed:  [x] YES  [ ] NO    Consultant(s) and primary physician Notes Reviewed:  [x] YES  [ ] NO    Care Discussed with Primary team/ Consultants/Other Providers [x] YES  [ ] NO    89F PMH HTN, HLD, CKD Stage V, hypothyroid COPD/reactive airway disease, depression, gout presents with lightheadedness, weakness, and diarrhea, found to be hypotensive; hx of urosepsis, self-catheterization multiple times daily; consult for WAI.     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, antibiotic-induced nephritis)   - likely cause is obstructive vs pre-renal/dehydration vs ischemic ATN 2/2 hypotension vs toxic ATN 2/2 rhabdo   - electrolyte and volume status noted   - no absolute indication for hemodialysis   - keep MAP > 65   - monitor urine output   - UOP 590cc/24 hr   - pt w/simpson   - UA positive but asymptomatic and straight cath hx, likely asymptomatic bacteruria    - Cr 4.84, baseline Cr ~2.2   - known CKD Stage V   - Urine Na 28; Ur Cr 17   - FENa 5.2% but on lasix   - denies NSAID use   - avoid ACE/ARB/NSAIDS and other nephrotoxic medications   - renal dosing of antibiotics   - recommend saline flushes of urinary catheter   - consider renal sono   - repeat BMP today   - repeat BMP, UA, and urine lytes (including urea) tomorrow AM, check FEUrea    - recommend CPK level r/o gabapentin-induced rhabdomyolysis   - renal diet     Thank you for the opportunity to participate in the care of your patient. The nephrology service remains available to assist with any questions or concerns. Please feel free to reach us by paging the on-call nephrology fellow for urgent issues or as below.     Pierre Marquez M.D.   PGY-4  [phone]

## 2020-07-08 NOTE — H&P ADULT - PROBLEM SELECTOR PLAN 6
history of hypertension  - holding home valsartan 320mg daily and lasix 80mg BID given hypotension on admission history of hypertension  - holding home valsartan 320mg daily and lasix 80mg BID given hypotension on admission    #hyperlipidemia  - take Atorvastatin 40mg daily at home

## 2020-07-08 NOTE — H&P ADULT - NSHPSOCIALHISTORY_GEN_ALL_CORE
No illicit drug use or alcohol use. Lives at home with her  and has a 24 hour home attendant. Requires assistance with ambulation and daily tasks.

## 2020-07-08 NOTE — H&P ADULT - PROBLEM SELECTOR PLAN 5
presenting with elevated troponin 0.03, EKG with NSR and no acute ischemic changes. Patient denies any chest pain or shortness of breath.  - likely secondary to demand ischemia  - trend to peak presenting with elevated troponin 0.03, EKG with NSR and no acute ischemic changes. Patient denies any chest pain or shortness of breath.  - likely secondary to demand ischemia  - now plateued 0.03 x2 UA on admission positive for bacteria, LE, and nitrites, likely in the setting of frequent straight cath at home. Denies any dysuria, hematuria, or suprapubic pain. S/p zosyn in the ED  - no tx indicated at this time given afebrile, no leukocytosis, and asymptomatic  - f/u urine culture

## 2020-07-08 NOTE — H&P ADULT - PROBLEM SELECTOR PLAN 10
F: s/p 1.5L NS in the ED  E: Replete K<4, Mg<2  N: NPO pending possible debridement  Dispo: F  Code Status: Full code F: s/p 1.5L NS in the ED, on maintenance fluids NS @100cc/hr  E: Replete K<4, Mg<2  N: NPO pending possible debridement  Dispo: RMF  Code Status: Full code F: s/p 1.5L NS in the ED, on maintenance fluids NS @100cc/hr  E: Replete K<4, Mg<2  N: NPO pending possible debridement  DVT PPX: hep subq  Dispo: F  Code Status: Full code

## 2020-07-09 ENCOUNTER — TRANSCRIPTION ENCOUNTER (OUTPATIENT)
Age: 85
End: 2020-07-09

## 2020-07-09 DIAGNOSIS — R19.7 DIARRHEA, UNSPECIFIED: ICD-10-CM

## 2020-07-09 LAB
ANION GAP SERPL CALC-SCNC: 7 MMOL/L — SIGNIFICANT CHANGE UP (ref 5–17)
BUN SERPL-MCNC: 38 MG/DL — HIGH (ref 7–23)
CALCIUM SERPL-MCNC: 8.1 MG/DL — LOW (ref 8.4–10.5)
CHLORIDE SERPL-SCNC: 108 MMOL/L — SIGNIFICANT CHANGE UP (ref 96–108)
CK SERPL-CCNC: 32 U/L — SIGNIFICANT CHANGE UP (ref 25–170)
CO2 SERPL-SCNC: 23 MMOL/L — SIGNIFICANT CHANGE UP (ref 22–31)
CREAT ?TM UR-MCNC: 65 MG/DL — SIGNIFICANT CHANGE UP
CREAT SERPL-MCNC: 3.64 MG/DL — HIGH (ref 0.5–1.3)
FOLATE SERPL-MCNC: 10 NG/ML — SIGNIFICANT CHANGE UP
GLUCOSE SERPL-MCNC: 114 MG/DL — HIGH (ref 70–99)
HCT VFR BLD CALC: 33.2 % — LOW (ref 34.5–45)
HGB BLD-MCNC: 10.2 G/DL — LOW (ref 11.5–15.5)
MAGNESIUM SERPL-MCNC: 1.8 MG/DL — SIGNIFICANT CHANGE UP (ref 1.6–2.6)
MCHC RBC-ENTMCNC: 30.7 GM/DL — LOW (ref 32–36)
MCHC RBC-ENTMCNC: 31 PG — SIGNIFICANT CHANGE UP (ref 27–34)
MCV RBC AUTO: 100.9 FL — HIGH (ref 80–100)
NRBC # BLD: 0 /100 WBCS — SIGNIFICANT CHANGE UP (ref 0–0)
PLATELET # BLD AUTO: 252 K/UL — SIGNIFICANT CHANGE UP (ref 150–400)
POTASSIUM SERPL-MCNC: 4.4 MMOL/L — SIGNIFICANT CHANGE UP (ref 3.5–5.3)
POTASSIUM SERPL-SCNC: 4.4 MMOL/L — SIGNIFICANT CHANGE UP (ref 3.5–5.3)
RBC # BLD: 3.29 M/UL — LOW (ref 3.8–5.2)
RBC # FLD: 14.3 % — SIGNIFICANT CHANGE UP (ref 10.3–14.5)
SODIUM SERPL-SCNC: 138 MMOL/L — SIGNIFICANT CHANGE UP (ref 135–145)
SODIUM UR-SCNC: 45 MMOL/L — SIGNIFICANT CHANGE UP
UUN UR-MCNC: 388 MG/DL — SIGNIFICANT CHANGE UP
VIT B12 SERPL-MCNC: 740 PG/ML — SIGNIFICANT CHANGE UP (ref 232–1245)
WBC # BLD: 7.43 K/UL — SIGNIFICANT CHANGE UP (ref 3.8–10.5)
WBC # FLD AUTO: 7.43 K/UL — SIGNIFICANT CHANGE UP (ref 3.8–10.5)

## 2020-07-09 PROCEDURE — 99233 SBSQ HOSP IP/OBS HIGH 50: CPT | Mod: GC

## 2020-07-09 PROCEDURE — 76770 US EXAM ABDO BACK WALL COMP: CPT | Mod: 26

## 2020-07-09 RX ORDER — DULOXETINE HYDROCHLORIDE 30 MG/1
1 CAPSULE, DELAYED RELEASE ORAL
Qty: 0 | Refills: 0 | DISCHARGE

## 2020-07-09 RX ORDER — CHOLECALCIFEROL (VITAMIN D3) 125 MCG
1 CAPSULE ORAL
Qty: 0 | Refills: 0 | DISCHARGE

## 2020-07-09 RX ORDER — MULTIVIT WITH MIN/MFOLATE/K2 340-15/3 G
1 POWDER (GRAM) ORAL ONCE
Refills: 0 | Status: COMPLETED | OUTPATIENT
Start: 2020-07-09 | End: 2020-07-09

## 2020-07-09 RX ORDER — DESIPRAMINE HYDROCHLORIDE 100 MG/1
1 TABLET ORAL
Qty: 0 | Refills: 0 | DISCHARGE

## 2020-07-09 RX ORDER — SENNA PLUS 8.6 MG/1
2 TABLET ORAL
Qty: 60 | Refills: 0
Start: 2020-07-09 | End: 2020-08-07

## 2020-07-09 RX ORDER — ESTROGENS, CONJUGATED 0.625 MG
1 TABLET ORAL
Qty: 0 | Refills: 0 | DISCHARGE

## 2020-07-09 RX ORDER — COLCHICINE 0.6 MG
1 TABLET ORAL
Qty: 0 | Refills: 0 | DISCHARGE

## 2020-07-09 RX ORDER — FUROSEMIDE 40 MG
1 TABLET ORAL
Qty: 0 | Refills: 0 | DISCHARGE

## 2020-07-09 RX ORDER — ROSUVASTATIN CALCIUM 5 MG/1
1 TABLET ORAL
Qty: 0 | Refills: 0 | DISCHARGE

## 2020-07-09 RX ORDER — ACETAMINOPHEN 500 MG
650 TABLET ORAL ONCE
Refills: 0 | Status: COMPLETED | OUTPATIENT
Start: 2020-07-09 | End: 2020-07-09

## 2020-07-09 RX ORDER — VALSARTAN 80 MG/1
325 TABLET ORAL
Qty: 0 | Refills: 0 | DISCHARGE

## 2020-07-09 RX ORDER — SENNA PLUS 8.6 MG/1
2 TABLET ORAL AT BEDTIME
Refills: 0 | Status: DISCONTINUED | OUTPATIENT
Start: 2020-07-09 | End: 2020-07-10

## 2020-07-09 RX ORDER — POLYETHYLENE GLYCOL 3350 17 G/17G
17 POWDER, FOR SOLUTION ORAL
Qty: 510 | Refills: 0
Start: 2020-07-09 | End: 2020-08-07

## 2020-07-09 RX ORDER — POLYETHYLENE GLYCOL 3350 17 G/17G
17 POWDER, FOR SOLUTION ORAL DAILY
Refills: 0 | Status: DISCONTINUED | OUTPATIENT
Start: 2020-07-09 | End: 2020-07-10

## 2020-07-09 RX ORDER — OMEPRAZOLE 10 MG/1
1 CAPSULE, DELAYED RELEASE ORAL
Qty: 0 | Refills: 0 | DISCHARGE

## 2020-07-09 RX ADMIN — SENNA PLUS 2 TABLET(S): 8.6 TABLET ORAL at 21:54

## 2020-07-09 RX ADMIN — PANTOPRAZOLE SODIUM 40 MILLIGRAM(S): 20 TABLET, DELAYED RELEASE ORAL at 06:09

## 2020-07-09 RX ADMIN — ALBUTEROL 1 PUFF(S): 90 AEROSOL, METERED ORAL at 06:09

## 2020-07-09 RX ADMIN — SODIUM CHLORIDE 70 MILLILITER(S): 9 INJECTION INTRAMUSCULAR; INTRAVENOUS; SUBCUTANEOUS at 06:12

## 2020-07-09 RX ADMIN — ALBUTEROL 1 PUFF(S): 90 AEROSOL, METERED ORAL at 12:30

## 2020-07-09 RX ADMIN — Medication 1 ENEMA: at 12:30

## 2020-07-09 RX ADMIN — Medication 25 MILLIGRAM(S): at 06:11

## 2020-07-09 RX ADMIN — POLYETHYLENE GLYCOL 3350 17 GRAM(S): 17 POWDER, FOR SOLUTION ORAL at 12:30

## 2020-07-09 RX ADMIN — HEPARIN SODIUM 5000 UNIT(S): 5000 INJECTION INTRAVENOUS; SUBCUTANEOUS at 06:09

## 2020-07-09 RX ADMIN — DULOXETINE HYDROCHLORIDE 20 MILLIGRAM(S): 30 CAPSULE, DELAYED RELEASE ORAL at 12:30

## 2020-07-09 RX ADMIN — HEPARIN SODIUM 5000 UNIT(S): 5000 INJECTION INTRAVENOUS; SUBCUTANEOUS at 21:54

## 2020-07-09 RX ADMIN — Medication 650 MILLIGRAM(S): at 21:53

## 2020-07-09 RX ADMIN — HEPARIN SODIUM 5000 UNIT(S): 5000 INJECTION INTRAVENOUS; SUBCUTANEOUS at 13:53

## 2020-07-09 RX ADMIN — Medication 1 ENEMA: at 17:20

## 2020-07-09 RX ADMIN — Medication 75 MICROGRAM(S): at 06:09

## 2020-07-09 RX ADMIN — ALBUTEROL 1 PUFF(S): 90 AEROSOL, METERED ORAL at 17:20

## 2020-07-09 RX ADMIN — ATORVASTATIN CALCIUM 40 MILLIGRAM(S): 80 TABLET, FILM COATED ORAL at 21:54

## 2020-07-09 NOTE — PROGRESS NOTE ADULT - PROBLEM SELECTOR PLAN 4
Patient presenting with [id number] right heel ulcer that has been present for 5 months. Managed by Dr. Oliva who debrided ulcer 1-2 weeks ago. S/p Vanc and zosyn in ED  - Afebrile without any leukocytosis  - Vancomycin 1g IV discontinued on 7/8  - LE duplex U/S: no DVT  - foot xray: fx deformities involving base of proximal phalanx of 2nd and 4th digits with overlying soft tissue swelling. Moderate OA changes in first metatarsophalangeal joint space  - wound cultures: normal skin srikanth  - vascular recs: c/w wound care (betadine+peroxide with kerlix WTD). F/u Dr. Oliva in 2 weeks ([phone])
WAI on CKD Stage V. Patient came in with Cr 4.84 with baseline ~2.2 (August 2019). Likely cause is 2/2 pre-renal/dehydration   - Cr improving to 4.31  - Patient with simpson  - Per nephro, no absolute indication for hemodialysis   - FENa 5.2% (post-renal etiology), but on lasix  - denies NSAID use   - avoid ACE/ARB/NSAIDS and other nephrotoxic medications   - renal dosing of antibiotics   - f/u renal sono  - f/u repeat BMP, UA, and urine lytes (including urea) tomorrow AM, calculate FEurea

## 2020-07-09 NOTE — PROGRESS NOTE ADULT - ASSESSMENT
Pt is a 89y Female w/ PMHx of HTN, HLD, CKD, hypothyroid, depression, gout who is being admitted for hypotensive with acute on chronic CKD, elevated troponin to 0.03 and UTI. Vascular consulted for right infected right heel ulcer.    - Continue wound care: Betadine+Peroxide with Kerlix WTD to R heel wound  - If patient is to be discharged, continue daily wound care, and follow-up with Dr. Oliva in 2 weeks  - Call [phone] for appointments

## 2020-07-09 NOTE — PROGRESS NOTE ADULT - ATTENDING COMMENTS
Patient with diarrhea and obstripation developed dehydration and pre-renal azotemia.  Underogoing cathartic and enema treatments at this time.  Creatinine is iimproving slowly with hydration due to [id number] CKD.  Patient does not appear toxic and is feeling better.  Renal follow-up as an outpatient in 2-3 weeks.  Patient to get a chem panel and CBC prior to that visit to determine that the renal function is improving.  Baseline creatinine was 2.2 mg;dl 11 months ago when the patient was last in my office.
patient with abdominal x-ray showing significant stool burden, concern for impaction w/ hx of diarrhea intermittently for past few months, worsening n/v and hx of requiring disimpaction  patient disimpacted on rounds this morning- GI PCR sent but diarrhea resolved, no leukocytosis, afebrile   will give fleet enema x 1 today   will start bowel regimen     patient's cr improving, renal u/s showing medical renal disease -   lasix and colchicine to be stopped and f/u with nephrology service outpatient    will d/c home today

## 2020-07-09 NOTE — PROGRESS NOTE ADULT - PROBLEM SELECTOR PLAN 1
WAI on CKD Stage V. Patient came in with Cr 4.84 with baseline ~2.2 (August 2019). Likely cause is 2/2 pre-renal/dehydration   - Cr improving to 3.64 today, continuing to downtrend  - Patient with simpson   - AM FENa 1.8%, FEurea 57.2%, both consistent with intrinsic renal etiology  - denies NSAID use   - avoid ACE/ARB/NSAIDS and other nephrotoxic medications   - renal dosing of antibiotics   - f/u renal sono scheduled today 7/9  - awaiting renal recs for potential dispo plans WAI on CKD Stage V. Patient came in with Cr 4.84 with baseline ~2.2 (August 2019). Likely cause is 2/2 pre-renal/dehydration   - Cr improving to 3.64 today, continuing to downtrend  - Patient with simpson   - AM FENa 1.8%, FEurea 57.2%, both consistent with intrinsic renal etiology  - denies NSAID use   - avoid ACE/ARB/NSAIDS and other nephrotoxic medications   - renal dosing of antibiotics   - renal U/S: small kidneys with increased echogenicity, consistent with chronic disease, no hydro  - awaiting renal recs for potential dispo plans WAI on CKD Stage V. Patient came in with Cr 4.84 with baseline ~2.2 (August 2019). Likely cause is 2/2 pre-renal/dehydration   - Cr improving to 3.64 today, continuing to downtrend  - Patient with simpson   - AM FENa 1.8%, FEurea 57.2%, both consistent with intrinsic renal etiology  - denies NSAID use   - avoid ACE/ARB/NSAIDS and other nephrotoxic medications   - renal dosing of antibiotics   - renal U/S: small kidneys with increased echogenicity, consistent with chronic disease, no hydro  - renal recs: stop lasix and colchicine for one week and outpatient f/u with Dr. Garcia in one week

## 2020-07-09 NOTE — PROGRESS NOTE ADULT - PROBLEM SELECTOR PLAN 2
Patient hypotension to 90/50 on admission likely 2/2 hypovolemia from diarrhea. - Currently resolved s/p 1.5L NS in ED and NS maintenance @ 70 cc/hr.   - rectal exam in ED demonstrated dark stools no juanjose blood, can be due to iron supplementation  -7/8 ABG unremarkable, pH 7.38/pCO2 44/HCO3 25/94% sat    #anemia  -Hgb 10.2, .9  -Baseline Hgb 9s-10s from outpatient records, appears chronic  -f/u AM B12 and folate levels

## 2020-07-09 NOTE — PROGRESS NOTE ADULT - PROBLEM SELECTOR PLAN 5
UA on admission positive for bacteria, LE, and nitrites. History of chronic bladder dysfunction hx with frequent UTIs. Aide catheterizes patient 4x/day. Patient denies dysuria or suprapubic pain. S/p zosyn in the ED  - no treatment indicated at this time as she is afebrile, asymptomatic, and without leukocytosis  - f/u urine culture
UA on admission positive for bacteria, LE, and nitrites. History of chronic bladder dysfunction hx with frequent UTIs. Aide catheterizes patient 4x/day. Patient denies dysuria or suprapubic pain. S/p zosyn in the ED  - no treatment indicated at this time as she is afebrile, asymptomatic, and without leukocytosis  - f/u urine culture

## 2020-07-09 NOTE — PROGRESS NOTE ADULT - PROBLEM SELECTOR PLAN 3
Patient complaining 3 months of black and foul smelling loose stools with poor PO intake. Denies recent antibiotic use. Per patient, recent colonoscopy a few years ago was normal.   - patient afebrile and without leukocytosis  - low suspicion for infectious etiology  - no episodes this admission  - Send GI PCR if episodes recur
Likely secondary to fecal impaction vs. viral etiology  -no diarrhea since admission  -afebrile without leukocytosis  -CXR Abd - nonobstructive bowel gas pattern  -s/p stool disimpaction 7/9 AM with Dr. Jewell. Black foul smelling stool  -f/u GI PCR, stool O+P, fecal occult results  -started on miralax 17 g oral daily and fleet enema. Monitor for response to regimen.

## 2020-07-09 NOTE — PROGRESS NOTE ADULT - SUBJECTIVE AND OBJECTIVE BOX
Patient is a 89y Female seen and evaluated at bedside.       Meds:    ALBUTerol    90 MICROgram(s) HFA Inhaler 1 four times a day  atorvastatin 40 at bedtime  bisacodyl Suppository 10 daily PRN  DULoxetine 20 daily  heparin   Injectable 5000 every 8 hours  levothyroxine 75 daily  pantoprazole    Tablet 40 before breakfast  polyethylene glycol 3350 17 daily  pregabalin 25 every 24 hours  senna 2 at bedtime  sodium chloride 0.9%. 1000 <Continuous>      T(C): , Max: 37 (20 @ 06:18)  T(F): , Max: 98.6 (20 @ 06:18)  HR: 73 (20 @ 10:20)  BP: 115/65 (20 @ 10:20)  BP(mean): --  RR: 18 (20 @ 10:20)  SpO2: 97% (20 @ 10:20)  Wt(kg): --     @ 07:01  -   @ 07:00  --------------------------------------------------------  IN: 1320 mL / OUT: 500 mL / NET: 820 mL     @ 07:01  -   @ 13:40  --------------------------------------------------------  IN: 0 mL / OUT: 600 mL / NET: -600 mL          Review of Systems:  CONSTITUTIONAL: No fever or chills, No fatigue or tiredness  RESPIRATORY: No shortness of breath, cough, hemoptysis  CARDIOVASCULAR: No chest pain or shortness of breath  GASTROINTESTINAL: No abdominal or flank pain, No nausea or vomiting, No diarrhea  GENITOURINARY: No dysuria or urinary burning, No difficulty passing urine, No hematuria  NEUROLOGICAL: No headaches or blurred vision  SKIN: No skin rashes   MUSCULOSKELETAL: No arthralgia, No leg edema, No muscle pain      PHYSICAL EXAM:  	Constitutional: elderly female, NAD, comfortable in bed  	Neck: Supple, no JVD  	Respiratory: crackles to the bases bilaterally   	Cardiovascular: RRR, normal S1 and S2   	Gastrointestinal: +BS, soft NTND   	Extremities: Right heel ulcer wrapped, 1+ pitting edema L > R   	Neurological: Alert w/o gross deficits       LABS:                        10.2   7.43  )-----------( 252      ( 2020 08:23 )             33.2     07-    138  |  108  |  38<H>  ----------------------------<  114<H>  4.4   |  23  |  3.64<H>    Ca    8.1<L>      2020 08:23  Phos  4.9       Mg     1.8         TPro  5.4<L>  /  Alb  2.3<L>  /  TBili  0.3  /  DBili  x   /  AST  28  /  ALT  14  /  AlkPhos  88  08      PT/INR - ( 2020 21:35 )   PT: 11.6 sec;   INR: 0.97          PTT - ( 2020 21:35 )  PTT:32.5 sec  Urinalysis Basic - ( 2020 23:12 )    Color: Yellow / Appearance: SL Cloudy / S.010 / pH: x  Gluc: x / Ketone: NEGATIVE  / Bili: Negative / Urobili: 0.2 E.U./dL   Blood: x / Protein: 30 mg/dL / Nitrite: POSITIVE   Leuk Esterase: Small / RBC: < 5 /HPF / WBC < 5 /HPF   Sq Epi: x / Non Sq Epi: 0-5 /HPF / Bacteria: Present /HPF      Sodium, Random Urine: 45 mmol/L ( @ 09:48)  Creatinine, Random Urine: 65 mg/dL ( @ 09:48)  Sodium, Random Urine: 39 mmol/L ( @ 16:22)  Creatinine, Random Urine: 44 mg/dL ( @ 16:22)  Sodium, Random Urine: 28 mmol/L ( @ 04:19)  Creatinine, Random Urine: 17 mg/dL ( @ 04:19)        RADIOLOGY & ADDITIONAL STUDIES:

## 2020-07-09 NOTE — PROGRESS NOTE ADULT - SUBJECTIVE AND OBJECTIVE BOX
O/N Events: No acute overnight events  Subjective/ROS: Patient seen and examined at bedside. Says she did not sleep well last night. Currently complaining of slight discomfort around the heel ulcer site but admits that has been a chronic issue. She denies any recent diarrhea and signs fo weakness. Patient also denies any nausea, headache, CP, SOB, lower leg swelling.       VITALS  Vital Signs Last 24 Hrs  T(C): 37 (2020 10:20), Max: 37 (2020 06:18)  T(F): 98.6 (2020 10:20), Max: 98.6 (2020 06:18)  HR: 73 (2020 10:20) (68 - 82)  BP: 115/65 (2020 10:20) (102/59 - 118/72)  BP(mean): --  RR: 18 (2020 10:20) (18 - 19)  SpO2: 97% (2020 10:20) (95% - 97%)    CAPILLARY BLOOD GLUCOSE      PHYSICAL EXAM  General: A&Ox2 (person and place), NAD, lying comfortably in bed  HEENT: no scleral icterus, MMM  Neck: Supple; no JVD  Respiratory: CTA B/L, no wheezes/crackles   Cardiovascular: Regular rhythm/rate; +S1 +S2, systolic murmur  Gastrointestinal: Soft, NTND, positive BS x4 quadrants, no rebound, no guarding, no suprapubic tenderness  Extremities: Trace edema from ankles to knees bilaterally; No cyanosis, no clubbing; pulses equal  Neurological:  no obvious focal deficits, follows commands    MEDICATIONS  (STANDING):  ALBUTerol    90 MICROgram(s) HFA Inhaler 1 Puff(s) Inhalation four times a day  atorvastatin 40 milliGRAM(s) Oral at bedtime  DULoxetine 20 milliGRAM(s) Oral daily  heparin   Injectable 5000 Unit(s) SubCutaneous every 8 hours  levothyroxine 75 MICROGram(s) Oral daily  pantoprazole    Tablet 40 milliGRAM(s) Oral before breakfast  polyethylene glycol 3350 17 Gram(s) Oral daily  pregabalin 25 milliGRAM(s) Oral every 24 hours  saline laxative (FLEET) Rectal Enema 1 Enema Rectal once  senna 2 Tablet(s) Oral at bedtime  sodium chloride 0.9%. 1000 milliLiter(s) (70 mL/Hr) IV Continuous <Continuous>    MEDICATIONS  (PRN):  bisacodyl Suppository 10 milliGRAM(s) Rectal daily PRN Constipation      No Known Allergies      LABS                        10.2   7.43  )-----------( 252      ( 2020 08:23 )             33.2     07-09    138  |  108  |  38<H>  ----------------------------<  114<H>  4.4   |  23  |  3.64<H>    Ca    8.1<L>      2020 08:23  Phos  4.9     07-08  Mg     1.8     07-09    TPro  5.4<L>  /  Alb  2.3<L>  /  TBili  0.3  /  DBili  x   /  AST  28  /  ALT  14  /  AlkPhos  88  07-08    PT/INR - ( 2020 21:35 )   PT: 11.6 sec;   INR: 0.97          PTT - ( 2020 21:35 )  PTT:32.5 sec  Urinalysis Basic - ( 2020 23:12 )    Color: Yellow / Appearance: SL Cloudy / S.010 / pH: x  Gluc: x / Ketone: NEGATIVE  / Bili: Negative / Urobili: 0.2 E.U./dL   Blood: x / Protein: 30 mg/dL / Nitrite: POSITIVE   Leuk Esterase: Small / RBC: < 5 /HPF / WBC < 5 /HPF   Sq Epi: x / Non Sq Epi: 0-5 /HPF / Bacteria: Present /HPF      CARDIAC MARKERS ( 2020 08:23 )  x     / x     / 32 U/L / x     / x      CARDIAC MARKERS ( 2020 01:14 )  x     / 0.03 ng/mL / x     / x     / x      CARDIAC MARKERS ( 2020 21:35 )  x     / 0.03 ng/mL / x     / x     / x            IMAGING/EKG/ETC: Reviewed O/N Events: No acute overnight events  Subjective/ROS: Patient seen and examined at bedside. Says she did not sleep well last night. Currently complaining of slight discomfort around the heel ulcer site but admits that has been a chronic issue. She denies any recent diarrhea and signs fo weakness. Patient also denies any nausea, headache, CP, SOB, lower leg swelling.     Due to abdominal X-ray showing stool, there was concern for stool impaction. Fecal disimpaction was done at bedside with Dr. Jewell, which resulted in moderate release of black foul-odored stool. Patient noted slight relief from disimpaction.       VITALS  Vital Signs Last 24 Hrs  T(C): 37 (2020 10:20), Max: 37 (2020 06:18)  T(F): 98.6 (2020 10:20), Max: 98.6 (2020 06:18)  HR: 73 (2020 10:20) (68 - 82)  BP: 115/65 (2020 10:20) (102/59 - 118/72)  BP(mean): --  RR: 18 (2020 10:20) (18 - 19)  SpO2: 97% (2020 10:20) (95% - 97%)    CAPILLARY BLOOD GLUCOSE      PHYSICAL EXAM  General: A&Ox2 (person and place), NAD, lying comfortably in bed  HEENT: no scleral icterus, MMM  Neck: Supple; no JVD  Respiratory: CTA B/L, no wheezes/crackles   Cardiovascular: Regular rhythm/rate; +S1 +S2, systolic murmur  Gastrointestinal: Soft, NTND, positive BS x4 quadrants, no rebound, no guarding, no suprapubic tenderness  Extremities: Trace edema from ankles to knees bilaterally; No cyanosis, no clubbing; pulses equal  Neurological:  no obvious focal deficits, follows commands    MEDICATIONS  (STANDING):  ALBUTerol    90 MICROgram(s) HFA Inhaler 1 Puff(s) Inhalation four times a day  atorvastatin 40 milliGRAM(s) Oral at bedtime  DULoxetine 20 milliGRAM(s) Oral daily  heparin   Injectable 5000 Unit(s) SubCutaneous every 8 hours  levothyroxine 75 MICROGram(s) Oral daily  pantoprazole    Tablet 40 milliGRAM(s) Oral before breakfast  polyethylene glycol 3350 17 Gram(s) Oral daily  pregabalin 25 milliGRAM(s) Oral every 24 hours  saline laxative (FLEET) Rectal Enema 1 Enema Rectal once  senna 2 Tablet(s) Oral at bedtime  sodium chloride 0.9%. 1000 milliLiter(s) (70 mL/Hr) IV Continuous <Continuous>    MEDICATIONS  (PRN):  bisacodyl Suppository 10 milliGRAM(s) Rectal daily PRN Constipation      No Known Allergies      LABS                        10.2   7.43  )-----------( 252      ( 2020 08:23 )             33.2     07-09    138  |  108  |  38<H>  ----------------------------<  114<H>  4.4   |  23  |  3.64<H>    Ca    8.1<L>      2020 08:23  Phos  4.9     07-08  Mg     1.8     07-09    TPro  5.4<L>  /  Alb  2.3<L>  /  TBili  0.3  /  DBili  x   /  AST  28  /  ALT  14  /  AlkPhos  88  07-08    PT/INR - ( 2020 21:35 )   PT: 11.6 sec;   INR: 0.97          PTT - ( 2020 21:35 )  PTT:32.5 sec  Urinalysis Basic - ( 2020 23:12 )    Color: Yellow / Appearance: SL Cloudy / S.010 / pH: x  Gluc: x / Ketone: NEGATIVE  / Bili: Negative / Urobili: 0.2 E.U./dL   Blood: x / Protein: 30 mg/dL / Nitrite: POSITIVE   Leuk Esterase: Small / RBC: < 5 /HPF / WBC < 5 /HPF   Sq Epi: x / Non Sq Epi: 0-5 /HPF / Bacteria: Present /HPF      CARDIAC MARKERS ( 2020 08:23 )  x     / x     / 32 U/L / x     / x      CARDIAC MARKERS ( 2020 01:14 )  x     / 0.03 ng/mL / x     / x     / x      CARDIAC MARKERS ( 2020 21:35 )  x     / 0.03 ng/mL / x     / x     / x            IMAGING/EKG/ETC: Reviewed

## 2020-07-09 NOTE — DISCHARGE NOTE PROVIDER - NSDCCPCAREPLAN_GEN_ALL_CORE_FT
PRINCIPAL DISCHARGE DIAGNOSIS  Diagnosis: Acute kidney injury  Assessment and Plan of Treatment: You had an acute kidney injury that was likely due to low volume from lasix and colchicine adverse effects. You are evaluated by nephrology and told to stop taking those medications for one week. Please call to schedule an appointment with Dr. Carlos Garcia.      SECONDARY DISCHARGE DIAGNOSES  Diagnosis: Hypotension  Assessment and Plan of Treatment: You came into the emergency department with a low blood pressure. You responded to IV fluids and have remained with stable blood pressure.    Diagnosis: Diarrhea  Assessment and Plan of Treatment: You did not have diarrhea since admission. Your abdominal X-ray showed nonobstructive bowel, but with significant stool. We fecal disimpacted you with black foul-smelling stool. You were given 2 fleet enemas along with magnesium citrate to help with the constipation. We assessed you at bedside and reported some relief. We are sending you home with miralax and senna tablets.    Diagnosis: Heel ulcer  Assessment and Plan of Treatment: You have a 5 month history of a chronic ulcer. The ulcer was evaluated by vascular surgery and they were not concerned for it being infected. They recommended wound care (betadine+peroxide with kerlix WTD) and follow up with Dr. Oliva in 2 weeks ([phone]).    Diagnosis: Asymptomatic bacteriuria  Assessment and Plan of Treatment: You were found to have a positive urinanalysis for bacteria, but due to your history of chronic bladder dysfunction and frequent UTIs along with being asymptomatic we dediced not to treat you. Nephrology also agreed with this decision.    Diagnosis: Gout  Assessment and Plan of Treatment: You have no acute gout flare during this admission. Due to colchicine potentially affecting your kidney function please hold on taking this medication for one week.    Diagnosis: History of COPD  Assessment and Plan of Treatment: You have a history of COPD. Please continue with home albuterol as needed PRINCIPAL DISCHARGE DIAGNOSIS  Diagnosis: Acute kidney injury  Assessment and Plan of Treatment: You had an acute kidney injury that was likely due to low volume from lasix and colchicine adverse effects. You are evaluated by nephrology and told to stop taking those medications for one week. Please call to schedule an appointment with Dr. Carlos Garcia.      SECONDARY DISCHARGE DIAGNOSES  Diagnosis: Hypotension  Assessment and Plan of Treatment: You came into the emergency department with a low blood pressure. You responded to IV fluids and have remained with stable blood pressure.    Diagnosis: Diarrhea  Assessment and Plan of Treatment: You did not have diarrhea since admission. Your abdominal X-ray showed nonobstructive bowel, but with significant stool. We fecal disimpacted you with black foul-smelling stool. You were given 2 fleet enemas along with magnesium citrate to help with the constipation. We assessed you at bedside and reported some relief. We are sending you home with dulcolex and senna tablets.    Diagnosis: Heel ulcer  Assessment and Plan of Treatment: You have a 5 month history of a chronic ulcer. The ulcer was evaluated by vascular surgery and they were not concerned for it being infected. They recommended wound care (betadine+peroxide with kerlix WTD) and follow up with Dr. Oliva in 2 weeks ([phone]).    Diagnosis: Asymptomatic bacteriuria  Assessment and Plan of Treatment: You were found to have a positive urinanalysis for bacteria, but due to your history of chronic bladder dysfunction and frequent UTIs along with being asymptomatic we dediced not to treat you. Nephrology also agreed with this decision.    Diagnosis: Gout  Assessment and Plan of Treatment: You have no acute gout flare during this admission. Due to colchicine potentially affecting your kidney function please hold on taking this medication for one week.    Diagnosis: History of COPD  Assessment and Plan of Treatment: You have a history of COPD. Please continue with home albuterol as needed

## 2020-07-09 NOTE — PROGRESS NOTE ADULT - PROBLEM SELECTOR PLAN 6
History of HTN  - holding home valsartan 320 mg daily and lasix 80 mg BID due to WAI and hypotension on admission
History of HTN  - holding home valsartan 320 mg daily and lasix 80 mg BID due to WAI and hypotension on admission

## 2020-07-09 NOTE — PROGRESS NOTE ADULT - ASSESSMENT
89F PMH HTN, HLD, CKD Stage V, hypothyroid COPD/reactive airway disease, depression, gout presents with lightheadedness, weakness, and diarrhea, found to be hypotensive; hx of urosepsis, self-catheterization multiple times daily; consult for WAI.     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 Garcia office; pt to call Tuesday to make appt     Thank you for the opportunity to participate in the care of your patient. The nephrology service remains available to assist with any questions or concerns. Please feel free to reach us by paging the on-call nephrology fellow for urgent issues or as below.     Pierre Marquez M.D.   PGY-4  [phone]

## 2020-07-09 NOTE — DISCHARGE NOTE PROVIDER - NSDCHC_MEDRECSTATUS_GEN_ALL_CORE
Admission Reconciliation is Not Complete  Discharge Reconciliation is Completed Admission Reconciliation is Not Complete  Discharge Reconciliation is Not Complete Admission Reconciliation is Completed  Discharge Reconciliation is Not Complete Admission Reconciliation is Completed  Discharge Reconciliation is Completed

## 2020-07-09 NOTE — PROGRESS NOTE ADULT - ASSESSMENT
89 yo F PMHx of HTN, HLD, CKD, hypothyroidism, COPD/reactive airway disease, depression, gout presents with lightheadedness, diarrhea, vomiting for the past week and 5 month right heel ulcer found to be hypotensive with WAI on CKD.

## 2020-07-09 NOTE — PROGRESS NOTE ADULT - SUBJECTIVE AND OBJECTIVE BOX
Vascular Surgery Consult - Progress Note    Subjective:  No acute complaints. Denies CP/SOB. Denies pain in foot. Denies N/V.    Vital Signs Last 24 Hrs  T(C): 37 (2020 06:18), Max: 37 (2020 06:18)  T(F): 98.6 (2020 06:18), Max: 98.6 (2020 06:18)  HR: 74 (2020 06:18) (68 - 82)  BP: 118/72 (2020 06:18) (102/59 - 118/72)  BP(mean): --  RR: 19 (2020 06:18) (18 - 19)  SpO2: 97% (2020 06:18) (95% - 97%)    I&O's Summary    2020 07:01  -  2020 07:00  --------------------------------------------------------  IN: 1320 mL / OUT: 500 mL / NET: 820 mL    2020 07:01  -  2020 09:39  --------------------------------------------------------  IN: 0 mL / OUT: 600 mL / NET: -600 mL    PHYSICAL EXAM:  Constitutional: NAD, awake  Respiratory: Unlabored breathing, no respiratory distress  Cardiovascular: RRR  Gastrointestinal: Soft, non distended, nontender  Extremities: Right heel ulcer with a small area of granulating tissue the rest of ulcer covered with dry eschar, right heel mild erythema, no warmth; no foul odor, LLE swelling   Vascular: Palpable DP/PT/Pop/Fem    LABS:                     10.2   7.43  )-----------( 252      ( 2020 08:23 )             33.2     07-09  138  |  108  |  38<H>  ----------------------------<  114<H>  4.4   |  23  |  3.64<H>    Ca    8.1<L>      2020 08:23  Phos  4.9     07-08  Mg     1.8     07-09    TPro  5.4<L>  /  Alb  2.3<L>  /  TBili  0.3  /  DBili  x   /  AST  28  /  ALT  14  /  AlkPhos  88  07-08    PT/INR - ( 2020 21:35 )   PT: 11.6 sec;   INR: 0.97     PTT - ( 2020 21:35 )  PTT:32.5 sec    Urinalysis Basic - ( 2020 23:12 )  Color: Yellow / Appearance: SL Cloudy / S.010 / pH: x  Gluc: x / Ketone: NEGATIVE  / Bili: Negative / Urobili: 0.2 E.U./dL   Blood: x / Protein: 30 mg/dL / Nitrite: POSITIVE   Leuk Esterase: Small / RBC: < 5 /HPF / WBC < 5 /HPF   Sq Epi: x / Non Sq Epi: 0-5 /HPF / Bacteria: Present /HPF    LIVER FUNCTIONS - ( 2020 06:46 )  Alb: 2.3 g/dL / Pro: 5.4 g/dL / ALK PHOS: 88 U/L / ALT: 14 U/L / AST: 28 U/L / GGT: x

## 2020-07-09 NOTE — DISCHARGE NOTE PROVIDER - HOSPITAL COURSE
#Discharge: do not delete        Patient is a 89 yo F PMHX of HTN, HLD, CKD, hypothyroidism, COPD/reactive airway disesase, depression, gout presents with lightheadedness, diarrhea, vomiting for the past week and 5 month right heel ulcer found to be hypotensive with WAI on CKD.         Problem List/Main Diagnoses (system-based):    1. WAI on CKD: Likely 2/2 pre-renal/dehydration. Cr 4.84 on admission, gradually improving to 3.64. Normal saline @ 70 cc/hr.        2. Hypotension: resolved s/p 1.5L NS in the ED and NS maintenence @ 70 cc/hr.        3. Anemia: no management required, stable Hgb 10.2 appears chronic from outpatient records A        4. Diarrhea: Likely secondary to fecal impaction vs. viral etiology. s/p 2x fleet enema and 1x magnesium citrate. Sent out stool O+P, GI PCR, fecal occult.          5. heel ulcer: continue with wound care and f/u with Dr. Oliva in 2 weeks ([phone])        6. Asymptomatic bacteriuria: No treatment indicated, history of chronic bladder dysfunction with frequent UTIs         7. Hypothyroidism: synthroid 75 mcg daily        8. Hypertension: held home valsartan 320 mg daily and lasix 80 mg BID        9. COPD: albuterol         10. Gout: held colchicine 0.6 mg in setting of WAI on CKD.        Inpatient treatment course:     -responded to 1.5L NS in the ED and maintained on NS @ 70 cc/hr    -s/p vanco 1g IV x1 and zosyn 3.375 IV x1 in the ED    -Abdominal Xray notable for nonobstructed bowel gas pattern with visible stools    -fecal disimpaction on 7/9 with dark stool removal, sent lab samples for O+P, GI PCR, and fecal occult testing    -Received 2 doses of fleet enema and one dose of magnesium citrate for constipation    -Creatinine continued to improve with maintenance normal saline         New medications: None    Labs to be followed outpatient: None    Exam to be followed outpatient: #Discharge: do not delete        Patient is a 89 yo F PMHX of HTN, HLD, CKD, hypothyroidism, COPD/reactive airway disesase, depression, gout presents with lightheadedness, diarrhea, vomiting for the past week and 5 month right heel ulcer found to be hypotensive with WAI on CKD.         Problem List/Main Diagnoses (system-based):    1. WAI on CKD: Likely 2/2 pre-renal/dehydration. Cr 4.84 on admission, gradually improving to 3.64 with normal saline @ 70 cc/hr. Stopped lasix per nephro recs for one week.         2. Hypotension: resolved s/p 1.5L NS in the ED and NS maintenence @ 70 cc/hr.        3. Anemia: no management required, stable Hgb 10.2 appears chronic from outpatient records         4. Diarrhea: Likely secondary to fecal impaction vs. viral etiology. s/p 2x fleet enema and 1x magnesium citrate. Sent out stool O+P, GI PCR, fecal occult.          5. heel ulcer: continue with wound care and f/u with Dr. Oliva in 2 weeks ([phone])        6. Asymptomatic bacteriuria: No treatment indicated, history of chronic bladder dysfunction with frequent UTIs         7. Hypothyroidism: synthroid 75 mcg daily        8. Hypertension: held home valsartan 320 mg daily and lasix 80 mg BID        9. COPD: albuterol         10. Gout: held colchicine 0.6 mg in setting of WAI on CKD. Patient informed not to take med for one week.        Inpatient treatment course:     -responded to 1.5L NS in the ED and maintained on NS @ 70 cc/hr    -s/p vanco 1g IV x1 and zosyn 3.375 IV x1 in the ED    -Abdominal Xray notable for nonobstructed bowel gas pattern with visible stools    -fecal disimpaction on 7/9 with dark stool removal, sent lab samples for O+P, GI PCR, and fecal occult testing    -Received 2 doses of fleet enema and one dose of magnesium citrate for constipation    -Creatinine continued to improve with maintenance normal saline         New medications: None    Labs to be followed outpatient: None    Exam to be followed outpatient: None #Discharge: do not delete        Patient is a 89 yo F PMHX of HTN, HLD, CKD, hypothyroidism, COPD/reactive airway disesase, depression, gout presents with lightheadedness, diarrhea, vomiting for the past week and 5 month right heel ulcer found to be hypotensive with WAI on CKD.         Problem List/Main Diagnoses (system-based):    1. WAI on CKD: Likely 2/2 pre-renal/dehydration. Cr 4.84 on admission, gradually improving to 3.64 with normal saline @ 70 cc/hr. Stopped lasix 80 mg q24h for one week per nephro recs        2. Hypotension: resolved s/p 1.5L NS in the ED and NS maintenence @ 70 cc/hr.        3. Anemia: no management required, stable Hgb 10.2 appears chronic from outpatient records         4. Diarrhea: Likely secondary to fecal impaction vs. viral etiology. s/p 2x fleet enema and 1x magnesium citrate. Sent out stool O+P, GI PCR, fecal occult.             5. heel ulcer: continue with wound care and f/u with Dr. Oliva in 2 weeks ([phone])        6. Asymptomatic bacteriuria: No treatment indicated, history of chronic bladder dysfunction with frequent UTIs        7. Hypothyroidism: synthroid 75 mcg daily        8. Hypertension: held home valsartan 320 mg daily and lasix 80 mg BID        9. COPD: albuterol         10. Gout: held colchicine 0.6 mg in setting of WAI on CKD. Patient informed not to take med for one week.        Inpatient treatment course:     -responded to 1.5L NS in the ED and maintained on NS @ 70 cc/hr    -s/p vanco 1g IV x1 and zosyn 3.375 IV x1 in the ED    -Becerra inserted 7/8 to monitor for I/Os    -7/8 Abdominal Xray notable for nonobstructed bowel gas pattern with visible stools    -fecal disimpaction on 7/9 with dark stool removal, negative GI PCR result    -7/9 Received 2 doses of fleet enema and one dose of magnesium citrate for constipation    -patient in pain s/p enema, KUB unremarkable for obstruction, decreased stool burden vs. 7/8 scan    -7/10 repeat fecal disimpaction done. Patient without any abdominal pain    -Creatinine continued to improve with maintenance normal saline     -Becerra catheter removed on 7/10 and patient passed TOV        New medications: senna, Dulcolax suppository    Labs to be followed outpatient: None    Exam to be followed outpatient: None

## 2020-07-09 NOTE — PROGRESS NOTE ADULT - PROBLEM SELECTOR PLAN 7
History of hypothyroidism  -c/w home synthroid 75 mcg daily  -TSH wnl   -free T3 1.25 (low)
History of hypothyroidism  -c/w home synthroid 75 mcg daily  -TSH wnl   -free T3 1.25 (low)

## 2020-07-09 NOTE — PROGRESS NOTE ADULT - PROBLEM SELECTOR PLAN 9
History of gout. Currently not in acute flare  - hold colchicine 0.6 mg in setting of WAI on CKD  - c/w pregabalin 150 mg BID
History of gout. Currently not in acute flare  - hold colchicine 0.6 mg in setting of WAI on CKD  - c/w pregabalin 150 mg BID

## 2020-07-09 NOTE — DISCHARGE NOTE PROVIDER - NSDCMRMEDTOKEN_GEN_ALL_CORE_FT
MiraLax oral powder for reconstitution: 17 gram(s) orally once a day   senna oral tablet: 2 tab(s) orally once a day (at bedtime) Dulcolax Bowel Cleansing oral and rectal kit: 1 each orally once a day   senna oral tablet: 2 tab(s) orally once a day (at bedtime) atorvastatin 10 mg oral tablet: 1 tab(s) orally once a day (at bedtime)   desipramine 10 mg oral tablet: 1 tab(s) orally once a day   Diovan 320 mg oral tablet: 1 tab(s) orally once a day  Dulcolax Bowel Cleansing oral and rectal kit: 1 each orally once a day   DULoxetine 20 mg oral delayed release capsule: 1 cap(s) orally once a day  levothyroxine 75 mcg (0.075 mg) oral tablet: 1 tab(s) orally once a day  omeprazole 40 mg oral delayed release capsule: 1 cap(s) orally once a day  senna oral tablet: 2 tab(s) orally once a day (at bedtime)   Vitamin D3 5000 intl units oral tablet: 1 tab(s) orally once a day atorvastatin 10 mg oral tablet: 1 tab(s) orally once a day (at bedtime)   Betadine 5% topical cream: Apply topically to affected area 2 times a day   desipramine 10 mg oral tablet: 1 tab(s) orally once a day   Diovan 320 mg oral tablet: 1 tab(s) orally once a day  Dulcolax Bowel Cleansing oral and rectal kit: 1 each orally once a day   DULoxetine 20 mg oral delayed release capsule: 1 cap(s) orally once a day  hydrogen peroxide 3% topical solution: Apply topically to affected area once a day   Kerlix: 1 application  once a day   levothyroxine 75 mcg (0.075 mg) oral tablet: 1 tab(s) orally once a day  omeprazole 40 mg oral delayed release capsule: 1 cap(s) orally once a day  senna oral tablet: 2 tab(s) orally once a day (at bedtime)   Vitamin D3 5000 intl units oral tablet: 1 tab(s) orally once a day

## 2020-07-10 ENCOUNTER — TRANSCRIPTION ENCOUNTER (OUTPATIENT)
Age: 85
End: 2020-07-10

## 2020-07-10 VITALS
SYSTOLIC BLOOD PRESSURE: 135 MMHG | DIASTOLIC BLOOD PRESSURE: 87 MMHG | OXYGEN SATURATION: 94 % | RESPIRATION RATE: 18 BRPM | TEMPERATURE: 97 F | HEART RATE: 75 BPM

## 2020-07-10 LAB
-  AMPICILLIN/SULBACTAM: SIGNIFICANT CHANGE UP
-  AMPICILLIN/SULBACTAM: SIGNIFICANT CHANGE UP
-  AMPICILLIN: SIGNIFICANT CHANGE UP
-  AMPICILLIN: SIGNIFICANT CHANGE UP
-  CEFAZOLIN: SIGNIFICANT CHANGE UP
-  CEFTRIAXONE: SIGNIFICANT CHANGE UP
-  CEFTRIAXONE: SIGNIFICANT CHANGE UP
-  CIPROFLOXACIN: SIGNIFICANT CHANGE UP
-  CIPROFLOXACIN: SIGNIFICANT CHANGE UP
-  CLINDAMYCIN: SIGNIFICANT CHANGE UP
-  ERYTHROMYCIN: SIGNIFICANT CHANGE UP
-  GENTAMICIN: SIGNIFICANT CHANGE UP
-  GENTAMICIN: SIGNIFICANT CHANGE UP
-  LINEZOLID: SIGNIFICANT CHANGE UP
-  NITROFURANTOIN: SIGNIFICANT CHANGE UP
-  OXACILLIN: SIGNIFICANT CHANGE UP
-  PIPERACILLIN/TAZOBACTAM: SIGNIFICANT CHANGE UP
-  PIPERACILLIN/TAZOBACTAM: SIGNIFICANT CHANGE UP
-  RIFAMPIN: SIGNIFICANT CHANGE UP
-  TOBRAMYCIN: SIGNIFICANT CHANGE UP
-  TOBRAMYCIN: SIGNIFICANT CHANGE UP
-  TRIMETHOPRIM/SULFAMETHOXAZOLE: SIGNIFICANT CHANGE UP
-  VANCOMYCIN: SIGNIFICANT CHANGE UP
ANION GAP SERPL CALC-SCNC: 13 MMOL/L — SIGNIFICANT CHANGE UP (ref 5–17)
BUN SERPL-MCNC: 33 MG/DL — HIGH (ref 7–23)
CALCIUM SERPL-MCNC: 8.2 MG/DL — LOW (ref 8.4–10.5)
CHLORIDE SERPL-SCNC: 107 MMOL/L — SIGNIFICANT CHANGE UP (ref 96–108)
CO2 SERPL-SCNC: 23 MMOL/L — SIGNIFICANT CHANGE UP (ref 22–31)
CREAT SERPL-MCNC: 3.21 MG/DL — HIGH (ref 0.5–1.3)
CULTURE RESULTS: SIGNIFICANT CHANGE UP
GLUCOSE SERPL-MCNC: 83 MG/DL — SIGNIFICANT CHANGE UP (ref 70–99)
HCT VFR BLD CALC: 32.4 % — LOW (ref 34.5–45)
HGB BLD-MCNC: 9.6 G/DL — LOW (ref 11.5–15.5)
MAGNESIUM SERPL-MCNC: 1.6 MG/DL — SIGNIFICANT CHANGE UP (ref 1.6–2.6)
MCHC RBC-ENTMCNC: 29.6 GM/DL — LOW (ref 32–36)
MCHC RBC-ENTMCNC: 29.9 PG — SIGNIFICANT CHANGE UP (ref 27–34)
MCV RBC AUTO: 100.9 FL — HIGH (ref 80–100)
METHOD TYPE: SIGNIFICANT CHANGE UP
NRBC # BLD: 0 /100 WBCS — SIGNIFICANT CHANGE UP (ref 0–0)
ORGANISM # SPEC MICROSCOPIC CNT: SIGNIFICANT CHANGE UP
PHOSPHATE SERPL-MCNC: 3.4 MG/DL — SIGNIFICANT CHANGE UP (ref 2.5–4.5)
PLATELET # BLD AUTO: 240 K/UL — SIGNIFICANT CHANGE UP (ref 150–400)
POTASSIUM SERPL-MCNC: 3.9 MMOL/L — SIGNIFICANT CHANGE UP (ref 3.5–5.3)
POTASSIUM SERPL-SCNC: 3.9 MMOL/L — SIGNIFICANT CHANGE UP (ref 3.5–5.3)
RBC # BLD: 3.21 M/UL — LOW (ref 3.8–5.2)
RBC # FLD: 14.1 % — SIGNIFICANT CHANGE UP (ref 10.3–14.5)
SODIUM SERPL-SCNC: 143 MMOL/L — SIGNIFICANT CHANGE UP (ref 135–145)
SPECIMEN SOURCE: SIGNIFICANT CHANGE UP
WBC # BLD: 6.72 K/UL — SIGNIFICANT CHANGE UP (ref 3.8–10.5)
WBC # FLD AUTO: 6.72 K/UL — SIGNIFICANT CHANGE UP (ref 3.8–10.5)

## 2020-07-10 PROCEDURE — 86850 RBC ANTIBODY SCREEN: CPT

## 2020-07-10 PROCEDURE — 84481 FREE ASSAY (FT-3): CPT

## 2020-07-10 PROCEDURE — 87186 SC STD MICRODIL/AGAR DIL: CPT

## 2020-07-10 PROCEDURE — 76770 US EXAM ABDO BACK WALL COMP: CPT

## 2020-07-10 PROCEDURE — 82746 ASSAY OF FOLIC ACID SERUM: CPT

## 2020-07-10 PROCEDURE — 74018 RADEX ABDOMEN 1 VIEW: CPT

## 2020-07-10 PROCEDURE — 80048 BASIC METABOLIC PNL TOTAL CA: CPT

## 2020-07-10 PROCEDURE — 83880 ASSAY OF NATRIURETIC PEPTIDE: CPT

## 2020-07-10 PROCEDURE — 96365 THER/PROPH/DIAG IV INF INIT: CPT | Mod: XU

## 2020-07-10 PROCEDURE — 85025 COMPLETE CBC W/AUTO DIFF WBC: CPT

## 2020-07-10 PROCEDURE — 82803 BLOOD GASES ANY COMBINATION: CPT

## 2020-07-10 PROCEDURE — 93005 ELECTROCARDIOGRAM TRACING: CPT | Mod: XU

## 2020-07-10 PROCEDURE — 84439 ASSAY OF FREE THYROXINE: CPT

## 2020-07-10 PROCEDURE — 87086 URINE CULTURE/COLONY COUNT: CPT

## 2020-07-10 PROCEDURE — 96375 TX/PRO/DX INJ NEW DRUG ADDON: CPT | Mod: XU

## 2020-07-10 PROCEDURE — 97161 PT EVAL LOW COMPLEX 20 MIN: CPT

## 2020-07-10 PROCEDURE — 71045 X-RAY EXAM CHEST 1 VIEW: CPT

## 2020-07-10 PROCEDURE — 93970 EXTREMITY STUDY: CPT

## 2020-07-10 PROCEDURE — 73620 X-RAY EXAM OF FOOT: CPT

## 2020-07-10 PROCEDURE — 82330 ASSAY OF CALCIUM: CPT

## 2020-07-10 PROCEDURE — 99239 HOSP IP/OBS DSCHRG MGMT >30: CPT

## 2020-07-10 PROCEDURE — 85610 PROTHROMBIN TIME: CPT

## 2020-07-10 PROCEDURE — 84295 ASSAY OF SERUM SODIUM: CPT

## 2020-07-10 PROCEDURE — 36415 COLL VENOUS BLD VENIPUNCTURE: CPT

## 2020-07-10 PROCEDURE — 96361 HYDRATE IV INFUSION ADD-ON: CPT

## 2020-07-10 PROCEDURE — U0003: CPT

## 2020-07-10 PROCEDURE — 84443 ASSAY THYROID STIM HORMONE: CPT

## 2020-07-10 PROCEDURE — 80053 COMPREHEN METABOLIC PANEL: CPT

## 2020-07-10 PROCEDURE — 99285 EMERGENCY DEPT VISIT HI MDM: CPT | Mod: 25

## 2020-07-10 PROCEDURE — 83605 ASSAY OF LACTIC ACID: CPT

## 2020-07-10 PROCEDURE — 82607 VITAMIN B-12: CPT

## 2020-07-10 PROCEDURE — 84132 ASSAY OF SERUM POTASSIUM: CPT

## 2020-07-10 PROCEDURE — 84484 ASSAY OF TROPONIN QUANT: CPT

## 2020-07-10 PROCEDURE — 93321 DOPPLER ECHO F-UP/LMTD STD: CPT

## 2020-07-10 PROCEDURE — 94640 AIRWAY INHALATION TREATMENT: CPT

## 2020-07-10 PROCEDURE — 83690 ASSAY OF LIPASE: CPT

## 2020-07-10 PROCEDURE — 85730 THROMBOPLASTIN TIME PARTIAL: CPT

## 2020-07-10 PROCEDURE — 86901 BLOOD TYPING SEROLOGIC RH(D): CPT

## 2020-07-10 PROCEDURE — 82570 ASSAY OF URINE CREATININE: CPT

## 2020-07-10 PROCEDURE — 83735 ASSAY OF MAGNESIUM: CPT

## 2020-07-10 PROCEDURE — 82550 ASSAY OF CK (CPK): CPT

## 2020-07-10 PROCEDURE — 84540 ASSAY OF URINE/UREA-N: CPT

## 2020-07-10 PROCEDURE — 51702 INSERT TEMP BLADDER CATH: CPT

## 2020-07-10 PROCEDURE — 84300 ASSAY OF URINE SODIUM: CPT

## 2020-07-10 PROCEDURE — 85027 COMPLETE CBC AUTOMATED: CPT

## 2020-07-10 PROCEDURE — 87507 IADNA-DNA/RNA PROBE TQ 12-25: CPT

## 2020-07-10 PROCEDURE — 74018 RADEX ABDOMEN 1 VIEW: CPT | Mod: 26

## 2020-07-10 PROCEDURE — 84100 ASSAY OF PHOSPHORUS: CPT

## 2020-07-10 PROCEDURE — 81001 URINALYSIS AUTO W/SCOPE: CPT

## 2020-07-10 PROCEDURE — 87070 CULTURE OTHR SPECIMN AEROBIC: CPT

## 2020-07-10 RX ORDER — HYDROGEN PEROXIDE 0.3 KG/100L
1 LIQUID TOPICAL
Qty: 1 | Refills: 0
Start: 2020-07-10

## 2020-07-10 RX ORDER — POVIDONE-IODINE 5 %
1 AEROSOL (ML) TOPICAL
Qty: 1 | Refills: 0
Start: 2020-07-10

## 2020-07-10 RX ORDER — CHOLECALCIFEROL (VITAMIN D3) 125 MCG
1 CAPSULE ORAL
Qty: 30 | Refills: 0
Start: 2020-07-10

## 2020-07-10 RX ORDER — LACTULOSE 10 G/15ML
10 SOLUTION ORAL ONCE
Refills: 0 | Status: DISCONTINUED | OUTPATIENT
Start: 2020-07-10 | End: 2020-07-10

## 2020-07-10 RX ORDER — OMEPRAZOLE 10 MG/1
1 CAPSULE, DELAYED RELEASE ORAL
Qty: 30 | Refills: 0
Start: 2020-07-10

## 2020-07-10 RX ORDER — DULOXETINE HYDROCHLORIDE 30 MG/1
1 CAPSULE, DELAYED RELEASE ORAL
Qty: 0 | Refills: 0 | DISCHARGE
Start: 2020-07-10

## 2020-07-10 RX ORDER — VALSARTAN 80 MG/1
1 TABLET ORAL
Qty: 30 | Refills: 0
Start: 2020-07-10

## 2020-07-10 RX ORDER — DESIPRAMINE HYDROCHLORIDE 100 MG/1
1 TABLET ORAL
Qty: 30 | Refills: 0
Start: 2020-07-10

## 2020-07-10 RX ORDER — LEVOTHYROXINE SODIUM 125 MCG
1 TABLET ORAL
Qty: 0 | Refills: 0 | DISCHARGE
Start: 2020-07-10

## 2020-07-10 RX ORDER — SODIUM HYPOCHLORITE 0.125 %
1 SOLUTION, NON-ORAL MISCELLANEOUS DAILY
Refills: 0 | Status: DISCONTINUED | OUTPATIENT
Start: 2020-07-10 | End: 2020-07-10

## 2020-07-10 RX ORDER — BISACODYL/MAGNESIUM CITRATE
1 COMBINATION PACKAGE (EA) MISCELLANEOUS
Qty: 30 | Refills: 0
Start: 2020-07-10 | End: 2020-08-08

## 2020-07-10 RX ORDER — ATORVASTATIN CALCIUM 80 MG/1
1 TABLET, FILM COATED ORAL
Qty: 30 | Refills: 0
Start: 2020-07-10

## 2020-07-10 RX ADMIN — PANTOPRAZOLE SODIUM 40 MILLIGRAM(S): 20 TABLET, DELAYED RELEASE ORAL at 06:54

## 2020-07-10 RX ADMIN — ALBUTEROL 1 PUFF(S): 90 AEROSOL, METERED ORAL at 06:53

## 2020-07-10 RX ADMIN — Medication 75 MICROGRAM(S): at 06:54

## 2020-07-10 RX ADMIN — SODIUM CHLORIDE 70 MILLILITER(S): 9 INJECTION INTRAMUSCULAR; INTRAVENOUS; SUBCUTANEOUS at 13:36

## 2020-07-10 RX ADMIN — HEPARIN SODIUM 5000 UNIT(S): 5000 INJECTION INTRAVENOUS; SUBCUTANEOUS at 06:54

## 2020-07-10 RX ADMIN — HEPARIN SODIUM 5000 UNIT(S): 5000 INJECTION INTRAVENOUS; SUBCUTANEOUS at 13:36

## 2020-07-10 RX ADMIN — Medication 25 MILLIGRAM(S): at 06:54

## 2020-07-10 RX ADMIN — ALBUTEROL 1 PUFF(S): 90 AEROSOL, METERED ORAL at 00:42

## 2020-07-10 RX ADMIN — POLYETHYLENE GLYCOL 3350 17 GRAM(S): 17 POWDER, FOR SOLUTION ORAL at 12:16

## 2020-07-10 RX ADMIN — ALBUTEROL 1 PUFF(S): 90 AEROSOL, METERED ORAL at 12:16

## 2020-07-10 RX ADMIN — DULOXETINE HYDROCHLORIDE 20 MILLIGRAM(S): 30 CAPSULE, DELAYED RELEASE ORAL at 12:16

## 2020-07-10 NOTE — CONSULT NOTE ADULT - ASSESSMENT
1F PMH HTN, HLD, CKD, hypothyroid COPD/reactive airway disease, depression, gout presents with lightheadedness, weakness, and diarrhea x 1 month with radiologic findings of fecal obstipation. 89F PMH HTN, HLD, CKD, hypothyroid COPD/reactive airway disease, depression, gout presents with lightheadedness, weakness, and diarrhea x 1 month with radiologic findings of fecal obstipation now s/p manual disimpaction by the medical team on 7/8 with minimal stool evacuated and several fleet enemas and mag citrate with only mucus output w/o any stool. Surgery now consulted for assistance with obstipation management.     AXR showing no signs of SBO and improving stool burden s/p disimpaction and several   Continue aggressive bowel regiment (enemas, suppositories, laxatives, etc)  On our rectal exam, no stool ball was palpated  Repeat manual disimpaction as per primary team  Con't diet as long as patient tolerating  Con't vascular recs for heel ulcer  Con't nephro recs for WAI on CKD    No surgical intervention at this time  Surgery signing off  Please re-consult as needed if patient begins to have signs of obstruction (Not passing gas, severe abdominal pain, vomiting unable to tolerate PO, increased distention, etc.)  Plan discussed with chief resident and attending on call

## 2020-07-10 NOTE — PROGRESS NOTE ADULT - SUBJECTIVE AND OBJECTIVE BOX
Patient was seen and examined at bedside. No acute event overnight. No complaints. Denies SOB, chest pain, RLE pain.       Vital Signs Last 24 Hrs  T(C): 36.5 (10 Jul 2020 10:04), Max: 36.9 (09 Jul 2020 15:43)  T(F): 97.7 (10 Jul 2020 10:04), Max: 98.5 (09 Jul 2020 15:43)  HR: 72 (10 Jul 2020 10:04) (71 - 73)  BP: 121/73 (10 Jul 2020 10:04) (120/61 - 129/66)  BP(mean): --  RR: 18 (10 Jul 2020 10:04) (18 - 20)  SpO2: 91% (10 Jul 2020 10:04) (91% - 93%)    Physical Exam:  General: NAD  Pulmonary: Nonlabored breathing, no respiratory distress  Cardiovascular: NSR  Abdominal: soft, NT/ND  Extremities: WWP, wound on R heel 6x6cm with a confined eschar in medial side. No erythema, no warmth, non tender to palpation no discharge.   Neuro: A/O x3, no focal deficits, normal sensation  Pulses: Palpable DP/PT/Pop/Fem      Lines/drains/tubes:    I&O's Summary    09 Jul 2020 07:01  -  10 Jul 2020 07:00  --------------------------------------------------------  IN: 350 mL / OUT: 1300 mL / NET: -950 mL        LABS:                        9.6    6.72  )-----------( 240      ( 10 Jul 2020 06:55 )             32.4     07-10    143  |  107  |  33<H>  ----------------------------<  83  3.9   |  23  |  3.21<H>    Ca    8.2<L>      10 Jul 2020 06:55  Phos  3.4     07-10  Mg     1.6     07-10

## 2020-07-10 NOTE — ADVANCED PRACTICE NURSE CONSULT - REASON FOR CONSULT
WOC nurse consult to assess heel ulcer. Vascular surgery assessed patient and recommended dressing orders. WOCN consult not indicated at this time. WOCN informed Dr Hensley of the same.

## 2020-07-10 NOTE — PROGRESS NOTE ADULT - ASSESSMENT
89F PMH HTN, HLD, CKD Stage V, hypothyroid COPD/reactive airway disease, depression, gout presents with lightheadedness, weakness, and diarrhea, found to be hypotensive; hx of urosepsis, self-catheterization multiple times daily; consult for WAI.     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 Garcia office; pt to call Tuesday to make appt     Thank you for the opportunity to participate in the care of your patient. The nephrology service remains available to assist with any questions or concerns. Please feel free to reach us by paging the on-call nephrology fellow for urgent issues or as below.     Pierre Marquez M.D.   PGY-4  [phone] 89F PMH HTN, HLD, CKD Stage V, hypothyroid COPD/reactive airway disease, depression, gout presents with lightheadedness, weakness, and diarrhea, found to be hypotensive; hx of urosepsis, self-catheterization multiple times daily; consult for WAI.     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 Garcia office; pt to call Tuesday to make appt     Thank you for the opportunity to participate in the care of your patient. The nephrology service remains available to assist with any questions or concerns. Please feel free to reach us by paging the on-call nephrology fellow for urgent issues or as below.     Pierre Marquez M.D.   PGY-4  [phone]

## 2020-07-10 NOTE — PHYSICAL THERAPY INITIAL EVALUATION ADULT - ADDITIONAL COMMENTS
Pt states she lives in an elevator building , lives with  and has a 24/7 aide. Aide assists pt with ambulation, transfers, adls in home. Pt uses a wheelchair for community

## 2020-07-10 NOTE — PHYSICAL THERAPY INITIAL EVALUATION ADULT - PERTINENT HX OF CURRENT PROBLEM, REHAB EVAL
89F  reports continued dark-colored diarrhea for the 3 months with multiple episodes per day and decreased PO intake.  experiencing nausea and nonbilious, nonbloody vomiting intermittently for the past week intermittently after eating. has infected right heel ulcer. She states ulcer has been present for about 5 months.

## 2020-07-10 NOTE — PROGRESS NOTE ADULT - REASON FOR ADMISSION
hypotension and right heel ulcer

## 2020-07-10 NOTE — PROGRESS NOTE ADULT - SUBJECTIVE AND OBJECTIVE BOX
Physical Medicine and Rehabilitation Progress Note:    Patient is a 89y old  Female who presents with a chief complaint of hypotension and right heel ulcer (10 Jul 2020 10:46)      HPI:  89F PMH HTN, HLD, CKD, hypothyroid COPD/reactive airway disease, depression, gout presents with lightheadedness, weakness, and diarrhea for the past several months. She has a home health aid at bedside who reports continued dark-colored diarrhea for the 3 months with multiple episodes per day and decreased PO intake. She does note her stools have been black and foul smelling but that she is also on supplemental iron. She also states that she has been experiencing nausea and nonbilious, nonbloody vomiting intermittently for the past week intermittently after eating. She was also noted to have decreased blood pressures at home for the past week, reporting systolic blood pressures as low as 70. Of note, patient has infected right heel ulcer. She states ulcer has been present for about 5 months. She has been seeing Dr. Oliva who debrided the ulcer about 1-2 weeks ago. She was instructed to do wet to dry dressing with betadine twice a day which she has been doing. Currently patient denies any fever, chills, chest pain, dizziness, lightheadedness, abdominal pain, dysuria or frequent urination.    In the ED- VS were T99, HR 68-72, BP 90//60 RR 18, SPO2 94% RA  Labs significant for Hgb 10.9, BUN 51, Cr 4.84, Troponin 0.03, , VBG 7.34 PCO2 60 HCO3 32 UA+ EKG: NSR, no acute ischemic findings   CXR showed pulmonary vascular congestion  Given Vancomycin IV 1g x1, Zosyn IV 3.375g x1, Protonix 80mg, and 1.5L NS (08 Jul 2020 02:03)                            9.6    6.72  )-----------( 240      ( 10 Jul 2020 06:55 )             32.4       07-10    143  |  107  |  33<H>  ----------------------------<  83  3.9   |  23  |  3.21<H>    Ca    8.2<L>      10 Jul 2020 06:55  Phos  3.4     07-10  Mg     1.6     07-10      Vital Signs Last 24 Hrs  T(C): 36.5 (10 Jul 2020 10:04), Max: 36.9 (09 Jul 2020 15:43)  T(F): 97.7 (10 Jul 2020 10:04), Max: 98.5 (09 Jul 2020 15:43)  HR: 72 (10 Jul 2020 10:04) (71 - 73)  BP: 121/73 (10 Jul 2020 10:04) (120/61 - 129/66)  BP(mean): --  RR: 18 (10 Jul 2020 10:04) (18 - 20)  SpO2: 91% (10 Jul 2020 10:04) (91% - 93%)    MEDICATIONS  (STANDING):  ALBUTerol    90 MICROgram(s) HFA Inhaler 1 Puff(s) Inhalation four times a day  atorvastatin 40 milliGRAM(s) Oral at bedtime  Dakins Solution - 1/2 Strength 1 Application(s) Topical daily  DULoxetine 20 milliGRAM(s) Oral daily  heparin   Injectable 5000 Unit(s) SubCutaneous every 8 hours  lactulose Syrup 10 Gram(s) Oral once  levothyroxine 75 MICROGram(s) Oral daily  pantoprazole    Tablet 40 milliGRAM(s) Oral before breakfast  polyethylene glycol 3350 17 Gram(s) Oral daily  pregabalin 25 milliGRAM(s) Oral every 12 hours  senna 2 Tablet(s) Oral at bedtime  sodium chloride 0.9%. 1000 milliLiter(s) (70 mL/Hr) IV Continuous <Continuous>    MEDICATIONS  (PRN):  bisacodyl Suppository 10 milliGRAM(s) Rectal daily PRN Constipation    Currently Undergoing Physical/ Occupational Therapy at bedside.    Functional Status Assessment:       Previous Level of Function:     · Ambulation Skills	needs device and assist	  · Transfer Skills	needs device and assist	  · ADL Skills	needs device and assist	  · Work/Leisure Activity	needs device and assist	  · Additional Comments	Pt states she lives in an elevator building , lives with  and has a 24/7 aide. Aide assists pt with ambulation, transfers, adls in home. Pt uses a wheelchair for community	    Cognitive Status Examination:   · Orientation	oriented to person, place, time and situation	  · Level of Consciousness	alert	  · Follows Commands and Answers Questions	100% of the time	  · Personal Safety and Judgment	intact	    Range of Motion Exam:   · Range of Motion Examination	no ROM deficits were identified	    Manual Muscle Testing:   · Manual Muscle Testing Results	Right DF 0/5, PF 5/5	    Bed Mobility: Sit to Supine:     · Level of Mershon	moderate assist (50% patients effort)	  · Physical Assist/Nonphysical Assist	verbal cues; 1 person assist	    Bed Mobility: Supine to Sit:     · Level of M[place]	moderate assist (50% patients effort)	  · Physical Assist/Nonphysical Assist	verbal cues; 1 person assist	    Bed Mobility Analysis:     · Bed Mobility Limitations	decreased ability to use arms for pushing/pulling; decreased ability to use legs for bridging/pushing	  · Impairments Contributing to Impaired Bed Mobility	decreased strength	    Transfer: Sit to Stand:     · Level of Mershon	moderate assist (50% patients effort)	  · Physical Assist/Nonphysical Assist	verbal cues; 2 person assist	  · Weight-Bearing Restrictions	weight-bearing as tolerated	  · Assistive Device	rolling walker	    Transfer: Stand to Sit:     · Level of M[place]hon	contact guard	  · Physical Assist/Nonphysical Assist	verbal cues; 2 person assist	  · Weight-Bearing Restrictions	weight-bearing as tolerated	    Gait Skills:     · Level of M[place]hon	moderate assist (50% patients effort)	  · Physical Assist/Nonphysical Assist	verbal cues; 2 person assist	  · Weight-Bearing Restrictions	weight-bearing as tolerated	  · Assistive Device	rolling walker	  · Gait Distance	2 steps forward, 2 side steps	    Gait Analysis:     · Gait Pattern Used	3-point gait	  · Gait Deviations Noted	decreased gold; increased time in double stance; decreased step length; decreased stride length	    Balance Skills Assessment:     · Sitting Balance: Static	good balance	  · Sitting Balance: Dynamic	good balance	  · Sit-to-Stand Balance	fair plus	  · Standing Balance: Static	fair plus	  · Standing Balance: Dynamic	fair plus	  · Systems Impairment Contributing to Balance Disturbance	musculoskeletal	  · Identified Impairments Contributing to Balance Disturbance	decreased strength	    Sensory Examination:   Sensory Examination:    Light Touch Sensation:   · Left LE	mild impairment	  · Right LE	mild impairment	      Clinical Impressions:   · Criteria for Skilled Therapeutic Interventions	impairments found; rehab potential; therapy frequency; anticipated discharge recommendation	  · Impairments Found (describe specific impairments)	aerobic capacity/endurance; gait, locomotion, and balance	  · Rehab Potential	good, to achieve stated therapy goals	  · Therapy Frequency	2-3x/week	        PM&R Impression: as above    Current Disposition Plan Recommendations:    d/c home, home physical therapy, resume 24 hr x 7 day home care

## 2020-07-10 NOTE — PROGRESS NOTE ADULT - ASSESSMENT
per Internal Medicine    89 yo F PMHx of HTN, HLD, CKD, hypothyroidism, COPD/reactive airway disease, depression, gout presents with lightheadedness, diarrhea, vomiting for the past week and 5 month right heel ulcer found to be hypotensive with WAI on CKD.     COVID-19 Negative Lab Result:  · COVID-19 Negative Lab Result	COVID-19 ruled out	    Problem/Plan - 1:  ·  Problem: Acute kidney injury superimposed on chronic kidney disease.  Plan: WAI on CKD Stage V. Patient came in with Cr 4.84 with baseline ~2.2 (August 2019). Likely cause is 2/2 pre-renal/dehydration   - Cr improving to 3.64 today, continuing to downtrend  - Patient with simpson   - AM FENa 1.8%, FEurea 57.2%, both consistent with intrinsic renal etiology  - denies NSAID use   - avoid ACE/ARB/NSAIDS and other nephrotoxic medications   - renal dosing of antibiotics   - renal U/S: small kidneys with increased echogenicity, consistent with chronic disease, no hydro  - renal recs: stop lasix and colchicine for one week and outpatient f/u with Dr. Garcia in one week.     Problem/Plan - 2:  ·  Problem: Hypotension.  Plan: Patient hypotension to 90/50 on admission likely 2/2 hypovolemia from diarrhea. - Currently resolved s/p 1.5L NS in ED and NS maintenance @ 70 cc/hr.   - rectal exam in ED demonstrated dark stools no juanjose blood, can be due to iron supplementation  -7/8 ABG unremarkable, pH 7.38/pCO2 44/HCO3 25/94% sat    #anemia  -Hgb 10.2, .9  -Baseline Hgb 9s-10s from outpatient records, appears chronic  -f/u AM B12 and folate levels.     Problem/Plan - 3:  ·  Problem: Diarrhea.  Plan: Likely secondary to fecal impaction vs. viral etiology  -no diarrhea since admission  -afebrile without leukocytosis  -CXR Abd - nonobstructive bowel gas pattern  -s/p stool disimpaction 7/9 AM with Dr. Jewell. Black foul smelling stool  -f/u GI PCR, stool O+P, fecal occult results  -started on miralax 17 g oral daily and fleet enema. Monitor for response to regimen.     Problem/Plan - 4:  ·  Problem: Heel ulcer.  Plan: Patient presenting with [id number] right heel ulcer that has been present for 5 months. Managed by Dr. Oliva who debrided ulcer 1-2 weeks ago. S/p Vanc and zosyn in ED  - Afebrile without any leukocytosis  - Vancomycin 1g IV discontinued on 7/8  - LE duplex U/S: no DVT  - foot xray: fx deformities involving base of proximal phalanx of 2nd and 4th digits with overlying soft tissue swelling. Moderate OA changes in first metatarsophalangeal joint space  - wound cultures: normal skin srikanth  - vascular recs: c/w wound care (betadine+peroxide with kerlix WTD). F/u Dr. Oliva in 2 weeks ([phone]).     Problem/Plan - 5:  ·  Problem: Asymptomatic bacteriuria.  Plan: UA on admission positive for bacteria, LE, and nitrites. History of chronic bladder dysfunction hx with frequent UTIs. Aide catheterizes patient 4x/day. Patient denies dysuria or suprapubic pain. S/p zosyn in the ED  - no treatment indicated at this time as she is afebrile, asymptomatic, and without leukocytosis  - f/u urine culture.     Problem/Plan - 6:  Problem: Hypertension. Plan: History of HTN  - holding home valsartan 320 mg daily and lasix 80 mg BID due to WAI and hypotension on admission.    Problem/Plan - 7:  ·  Problem: Hypothyroidism.  Plan: History of hypothyroidism  -c/w home synthroid 75 mcg daily  -TSH wnl   -free T3 1.25 (low).     Problem/Plan - 8:  ·  Problem: COPD (chronic obstructive pulmonary disease).  Plan: History of COPD/reactive airway disease. CXR with pulmonary vascular congestion. Saturating well on room air  - c/w home albuterol.     Problem/Plan - 9:  ·  Problem: Gout.  Plan: History of gout. Currently not in acute flare  - hold colchicine 0.6 mg in setting of WAI on CKD  - c/w pregabalin 150 mg BID.     Problem/Plan - 10:  Problem: Prophylactic measure. Plan; F: NS @ 70 cc/hr  E: Replete K < 4, Mg < 2  N: consistent carbohydrate renal w/ evening snack  DVT ppx: Heparin subQ  Dispo: F    Code status: Full code.

## 2020-07-10 NOTE — DISCHARGE NOTE NURSING/CASE MANAGEMENT/SOCIAL WORK - PATIENT PORTAL LINK FT
You can access the FollowMyHealth Patient Portal offered by [facility] by registering at the following website: [email or web address]. By joining [facility]’s FollowMyHealth portal, you will also be able to view your health information using other applications (apps) compatible with our system.

## 2020-07-10 NOTE — PROGRESS NOTE ADULT - SUBJECTIVE AND OBJECTIVE BOX
Patient is a 89y Female seen and evaluated at bedside.       Meds:    ALBUTerol    90 MICROgram(s) HFA Inhaler 1 four times a day  atorvastatin 40 at bedtime  bisacodyl Suppository 10 daily PRN  DULoxetine 20 daily  heparin   Injectable 5000 every 8 hours  lactulose Syrup 10 once  levothyroxine 75 daily  pantoprazole    Tablet 40 before breakfast  polyethylene glycol 3350 17 daily  pregabalin 25 every 24 hours  senna 2 at bedtime  sodium chloride 0.9%. 1000 <Continuous>      T(C): , Max: 37 (07-09-20 @ 10:20)  T(F): , Max: 98.6 (07-09-20 @ 10:20)  HR: 72 (07-10-20 @ 05:11)  BP: 129/66 (07-10-20 @ 05:11)  BP(mean): --  RR: 18 (07-10-20 @ 05:11)  SpO2: 93% (07-10-20 @ 05:11)  Wt(kg): --    07-09 @ 07:01  -  07-10 @ 07:00  --------------------------------------------------------  IN: 0 mL / OUT: 1300 mL / NET: -1300 mL          Review of Systems:  CONSTITUTIONAL: No fever or chills, No fatigue or tiredness  RESPIRATORY: No shortness of breath, cough, hemoptysis  CARDIOVASCULAR: No chest pain or shortness of breath  GASTROINTESTINAL: No abdominal or flank pain, No nausea or vomiting, No diarrhea  GENITOURINARY: No dysuria or urinary burning, No difficulty passing urine, No hematuria  NEUROLOGICAL: No headaches or blurred vision  SKIN: No skin rashes   MUSCULOSKELETAL: No arthralgia, No leg edema, No muscle pain      PHYSICAL EXAM:  	Constitutional: elderly female, NAD, comfortable in bed  	Neck: Supple, no JVD  	Respiratory: crackles to the bases bilaterally   	Cardiovascular: RRR, normal S1 and S2   	Gastrointestinal: +BS, soft NTND   	Extremities: Right heel ulcer wrapped, 1+ pitting edema L > R     Neurological: Alert w/o gross deficits         LABS:                        9.6    6.72  )-----------( 240      ( 10 Jul 2020 06:55 )             32.4     07-10    143  |  107  |  33<H>  ----------------------------<  83  3.9   |  23  |  3.21<H>    Ca    8.2<L>      10 Jul 2020 06:55  Phos  3.4     07-10  Mg     1.6     07-10            Sodium, Random Urine: 45 mmol/L (07-09 @ 09:48)  Creatinine, Random Urine: 65 mg/dL (07-09 @ 09:48)  Sodium, Random Urine: 39 mmol/L (07-08 @ 16:22)  Creatinine, Random Urine: 44 mg/dL (07-08 @ 16:22)        RADIOLOGY & ADDITIONAL STUDIES: Patient is a 89y Female seen and evaluated at bedside.   no complaints   Pending Surgery consult for stool impaction.       Meds:    ALBUTerol    90 MICROgram(s) HFA Inhaler 1 four times a day  atorvastatin 40 at bedtime  bisacodyl Suppository 10 daily PRN  DULoxetine 20 daily  heparin   Injectable 5000 every 8 hours  lactulose Syrup 10 once  levothyroxine 75 daily  pantoprazole    Tablet 40 before breakfast  polyethylene glycol 3350 17 daily  pregabalin 25 every 24 hours  senna 2 at bedtime  sodium chloride 0.9%. 1000 <Continuous>      T(C): , Max: 37 (07-09-20 @ 10:20)  T(F): , Max: 98.6 (07-09-20 @ 10:20)  HR: 72 (07-10-20 @ 05:11)  BP: 129/66 (07-10-20 @ 05:11)  BP(mean): --  RR: 18 (07-10-20 @ 05:11)  SpO2: 93% (07-10-20 @ 05:11)  Wt(kg): --    07-09 @ 07:01  -  07-10 @ 07:00  --------------------------------------------------------  IN: 0 mL / OUT: 1300 mL / NET: -1300 mL          Review of Systems:  CONSTITUTIONAL: No fever or chills, No fatigue or tiredness  RESPIRATORY: No shortness of breath, cough, hemoptysis  CARDIOVASCULAR: No chest pain or shortness of breath  GASTROINTESTINAL: No abdominal or flank pain, No nausea or vomiting, No diarrhea  GENITOURINARY: No dysuria or urinary burning  NEUROLOGICAL: No headaches or blurred vision  SKIN: No skin rashes   MUSCULOSKELETAL: No arthralgia, No leg edema, No muscle pain      PHYSICAL EXAM:  	Constitutional: elderly female, NAD, comfortable in bed  	Neck: Supple, no JVD  	Respiratory: crackles to the bases bilaterally   	Cardiovascular: RRR, normal S1 and S2   	Gastrointestinal: +BS, soft NTND   	Extremities: Right heel ulcer wrapped, 1+ pitting edema L > R     Neurological: Alert w/o gross deficits         LABS:                        9.6    6.72  )-----------( 240      ( 10 Jul 2020 06:55 )             32.4     07-10    143  |  107  |  33<H>  ----------------------------<  83  3.9   |  23  |  3.21<H>    Ca    8.2<L>      10 Jul 2020 06:55  Phos  3.4     07-10  Mg     1.6     07-10            Sodium, Random Urine: 45 mmol/L (07-09 @ 09:48)  Creatinine, Random Urine: 65 mg/dL (07-09 @ 09:48)  Sodium, Random Urine: 39 mmol/L (07-08 @ 16:22)  Creatinine, Random Urine: 44 mg/dL (07-08 @ 16:22)        RADIOLOGY & ADDITIONAL STUDIES:

## 2020-07-10 NOTE — PROGRESS NOTE ADULT - ASSESSMENT
Pt is a 89y Female w/ PMHx of HTN, HLD, CKD, hypothyroid, depression, gout who is being admitted for hypotensive with acute on chronic CKD, elevated troponin to 0.03 and UTI. Vascular consulted for right infected right heel ulcer.    - Continue wound care: Betadine+Peroxide with Kerlix WTD to R heel wound + ace wrap to knee level (don't make the ace dressing tight )   - If patient is to be discharged, continue daily wound care, and follow-up with Dr. Oliva in 2 weeks  - Call [phone] for appointments

## 2020-07-10 NOTE — CONSULT NOTE ADULT - SUBJECTIVE AND OBJECTIVE BOX
[facility]    HPI:  89F PMH HTN, HLD, CKD, hypothyroid COPD/reactive airway disease, depression, gout presents with lightheadedness, weakness, and diarrhea x 1 month. Pt reports for the past few weeks she has been having large bouts of diarrhea every 2-3 days following meals a/w nausea and NBNB vomiting. Pt denies blood in her stool but reports that her stool has been dark black for years due to supplemental iron that she takes.  Pt states that she has contiued to pass gas. Pt says that she has never had this issue before and normally has very consistent bowel movements. Pt states that due to her diarrhea and nasuea she has been   for the past several months. She has a home health aid at bedside who reports continued dark-colored diarrhea for the 3 months with multiple episodes per day and decreased PO intake. She does note her stools have been black and foul smelling but that she is also on supplemental iron. She also states that she has been experiencing nausea and nonbilious, nonbloody vomiting intermittently for the past week intermittently after eating. She was also noted to have decreased blood pressures at home for the past week, reporting systolic blood pressures as low as 70. Of note, patient has infected right heel ulcer. She states ulcer has been present for about 5 months. She has been seeing Dr. Oliva who debrided the ulcer about 1-2 weeks ago. She was instructed to do wet to dry dressing with betadine twice a day which she has been doing. Currently patient denies any fever, chills, chest pain, dizziness, lightheadedness, abdominal pain, dysuria or frequent urination.    In the ED- VS were T99, HR 68-72, BP 90//60 RR 18, SPO2 94% RA  Labs significant for Hgb 10.9, BUN 51, Cr 4.84, Troponin 0.03, , VBG 7.34 PCO2 60 HCO3 32 UA+ EKG: NSR, no acute ischemic findings   CXR showed pulmonary vascular congestion  Given Vancomycin IV 1g x1, Zosyn IV 3.375g x1, Protonix 80mg, and 1.5L NS (08 Jul 2020 02:03)      PMH:  PSH:  Meds:  Allerg:  SH:  FH:    PAST MEDICAL & SURGICAL HISTORY:  Gout  Depression  Urinary retention  Essential hypertension  Other specified hypothyroidism  Chronic kidney disease, stage IV (severe)  History of cholecystectomy  H/O abdominal hysterectomy      MEDICATIONS  (STANDING):  ALBUTerol    90 MICROgram(s) HFA Inhaler 1 Puff(s) Inhalation four times a day  atorvastatin 40 milliGRAM(s) Oral at bedtime  Dakins Solution - 1/2 Strength 1 Application(s) Topical daily  DULoxetine 20 milliGRAM(s) Oral daily  heparin   Injectable 5000 Unit(s) SubCutaneous every 8 hours  lactulose Syrup 10 Gram(s) Oral once  levothyroxine 75 MICROGram(s) Oral daily  pantoprazole    Tablet 40 milliGRAM(s) Oral before breakfast  polyethylene glycol 3350 17 Gram(s) Oral daily  pregabalin 25 milliGRAM(s) Oral every 12 hours  senna 2 Tablet(s) Oral at bedtime  sodium chloride 0.9%. 1000 milliLiter(s) (70 mL/Hr) IV Continuous <Continuous>    MEDICATIONS  (PRN):  bisacodyl Suppository 10 milliGRAM(s) Rectal daily PRN Constipation      Allergies    No Known Allergies    Intolerances        SOCIAL HISTORY:    FAMILY HISTORY:  No pertinent family history in first degree relatives      REVIEW OF SYSTEMS      General:	    Skin/Breast:  	  Ophthalmologic:  	  ENMT:	    Respiratory and Thorax:  	  Cardiovascular:	    Gastrointestinal:	    Genitourinary:	    Musculoskeletal:	    Neurological:	    Psychiatric:	    Hematology/Lymphatics:	    Endocrine:	    Allergic/Immunologic:	    Vital Signs Last 24 Hrs  T(C): 36.5 (10 Jul 2020 10:04), Max: 36.9 (09 Jul 2020 15:43)  T(F): 97.7 (10 Jul 2020 10:04), Max: 98.5 (09 Jul 2020 15:43)  HR: 72 (10 Jul 2020 10:04) (71 - 73)  BP: 121/73 (10 Jul 2020 10:04) (120/61 - 129/66)  BP(mean): --  RR: 18 (10 Jul 2020 10:04) (18 - 20)  SpO2: 91% (10 Jul 2020 10:04) (91% - 93%)    I&O's Summary    09 Jul 2020 07:01  -  10 Jul 2020 07:00  --------------------------------------------------------  IN: 350 mL / OUT: 1300 mL / NET: -950 mL        Physical Exam:  General: NAD, resting comfortably  HEENT: NC/AT, EOMI, normal hearing, no carotid bruits  Pulmonary: normal resp effort, CTA-B  Cardiovascular: NSR  Abdominal: soft, NT/ND, no organomegaly  Extremities: WWP, normal strength, no clubbing/cyanosis/edema  Neuro: A/O x 3, CNs II-XII grossly intact, normal sensation, no focal deficits  Pulses:   Right:  FEM [ ]2+ [ ]1+ [ ]doppler    POP [ ]2+ [ ]1+ [ ]doppler  DP [ ]2+ [ ]1+ [ ]doppler  PT[ ]2+ [ ]1+ [ ]doppler    Left:  FEM [ ]2+ [ ]1+ [ ]doppler  POP [ ]2+ [ ]1+ [ ]doppler  DP [ ]2+ [ ]1+ [ ]doppler  PT [ ]2+ [ ]1+ [ ]doppler    Lines/drains/tubes:    LABS:                        9.6    6.72  )-----------( 240      ( 10 Jul 2020 06:55 )             32.4     07-10    143  |  107  |  33<H>  ----------------------------<  83  3.9   |  23  |  3.21<H>    Ca    8.2<L>      10 Jul 2020 06:55  Phos  3.4     07-10  Mg     1.6     07-10          CAPILLARY BLOOD GLUCOSE            Cultures:  Culture Results:   GI PCR Results: NOT detected  *******Please Note:*******  GI panel PCR evaluates for:  Campylobacter, Plesiomonas shigelloides, Salmonella,  Vibrio, Yersinia enterocolitica, Enteroaggregative  Escherichia coli (EAEC), Enteropathogenic E.coli (EPEC),  Enterotoxigenic E. coli (ETEC) lt/st, Shiga-like  toxin-producing E. coli (STEC) stx1/stx2,  Shigella/ Enteroinvasive E. coli (EIEC), Cryptosporidium,  Cyclospora cayetanensis, Entamoeba histolytica,  Giardia lamblia, Adenovirus F 40/41, Astrovirus,  Norovirus GI/GII, Rotavirus A, Sapovirus (07-10 @ 01:01)      RADIOLOGY & ADDITIONAL STUDIES:      Assessment: 89y Female    Recommendations:  -   - discussed with Chief on call  - call x [phone] with questions [facility]    HPI:  89F PMH HTN, HLD, CKD, hypothyroid COPD/reactive airway disease, depression, gout presents with lightheadedness, weakness, and diarrhea x 1 month. Pt reports for the past few weeks she has been having large bouts of diarrhea every 2-3 days following meals a/w nausea and NBNB vomiting every 3 days. Pt denies blood in her stool but reports that her stool has been dark black for years due to supplemental iron that she takes.  Pt states that she has continued to pass gas. Pt says that she has never had this issue before and normally has very consistent bowel movements. Pt states that shes had poor PO intake due to her diarrhea and nausea. Pt denies fever, chills, urinary sx. Pt was brought on 7/8 due to weakness and in the ED found to be hypotensive (BP 90/50s) and WAI an CKD (4.8 on admission w/ baseline Cr 2.5). In the ED pt was fluid resuscitated BP improved and Cr improving. AXR was obtained showing severe stool burden in the right colon and transverse colon, no signs of SBO.     In the ED- VS were T99, HR 68-72, BP 90//60 RR 18, SPO2 94% RA  Labs significant for Hgb 10.9, BUN 51, Cr 4.84, Troponin 0.03, , VBG 7.34 PCO2 60 HCO3 32 UA+ EKG: NSR, no acute ischemic findings   CXR showed pulmonary vascular congestion  Given Vancomycin IV 1g x1, Zosyn IV 3.375g x1, Protonix 80mg, and 1.5L NS (08 Jul 2020 02:03)    PAST MEDICAL & SURGICAL HISTORY:  Gout  Depression  Urinary retention  Essential hypertension  Other specified hypothyroidism  Chronic kidney disease, stage IV (severe)  History of cholecystectomy  H/O abdominal hysterectomy      MEDICATIONS  (STANDING):  ALBUTerol    90 MICROgram(s) HFA Inhaler 1 Puff(s) Inhalation four times a day  atorvastatin 40 milliGRAM(s) Oral at bedtime  Dakins Solution - 1/2 Strength 1 Application(s) Topical daily  DULoxetine 20 milliGRAM(s) Oral daily  heparin   Injectable 5000 Unit(s) SubCutaneous every 8 hours  lactulose Syrup 10 Gram(s) Oral once  levothyroxine 75 MICROGram(s) Oral daily  pantoprazole    Tablet 40 milliGRAM(s) Oral before breakfast  polyethylene glycol 3350 17 Gram(s) Oral daily  pregabalin 25 milliGRAM(s) Oral every 12 hours  senna 2 Tablet(s) Oral at bedtime  sodium chloride 0.9%. 1000 milliLiter(s) (70 mL/Hr) IV Continuous <Continuous>    MEDICATIONS  (PRN):  bisacodyl Suppository 10 milliGRAM(s) Rectal daily PRN Constipation      Allergies: No Known Allergies        FAMILY HISTORY:  No pertinent family history in first degree relatives      Vital Signs Last 24 Hrs  T(C): 36.5 (10 Jul 2020 10:04), Max: 36.9 (09 Jul 2020 15:43)  T(F): 97.7 (10 Jul 2020 10:04), Max: 98.5 (09 Jul 2020 15:43)  HR: 72 (10 Jul 2020 10:04) (71 - 73)  BP: 121/73 (10 Jul 2020 10:04) (120/61 - 129/66)  BP(mean): --  RR: 18 (10 Jul 2020 10:04) (18 - 20)  SpO2: 91% (10 Jul 2020 10:04) (91% - 93%)    I&O's Summary    09 Jul 2020 07:01  -  10 Jul 2020 07:00  --------------------------------------------------------  IN: 350 mL / OUT: 1300 mL / NET: -950 mL    Physical Exam:  General: NAD, resting comfortably  Pulmonary: normal resp effort  Abdominal: softly distended, tender to deep palpation in the LUQ and LLQ. no rebound or guarding.   Rectum: rectal exam wnl, clean, no stool burden felt  Extremities: WWP, Right heel ulcer with dressing clean dry and intact.   Neuro: A/O x 3    Lines/drains/tubes:    LABS:                        9.6    6.72  )-----------( 240      ( 10 Jul 2020 06:55 )             32.4     07-10    143  |  107  |  33<H>  ----------------------------<  83  3.9   |  23  |  3.21<H>    Ca    8.2<L>      10 Jul 2020 06:55  Phos  3.4     07-10  Mg     1.6     07-10      Cultures:  Culture Results:   GI PCR Results: NOT detected  *******Please Note:*******  GI panel PCR evaluates for:  Campylobacter, Plesiomonas shigelloides, Salmonella,  Vibrio, Yersinia enterocolitica, Enteroaggregative  Escherichia coli (EAEC), Enteropathogenic E.coli (EPEC),  Enterotoxigenic E. coli (ETEC) lt/st, Shiga-like  toxin-producing E. coli (STEC) stx1/stx2,  Shigella/ Enteroinvasive E. coli (EIEC), Cryptosporidium,  Cyclospora cayetanensis, Entamoeba histolytica,  Giardia lamblia, Adenovirus F 40/41, Astrovirus,  Norovirus GI/GII, Rotavirus A, Sapovirus (07-10 @ 01:01)      RADIOLOGY & ADDITIONAL STUDIES:      Assessment: 89y Female    Recommendations:  -   - discussed with Chief on call  - call x [phone] with questions [facility]    HPI:  89F PMH HTN, HLD, CKD, hypothyroid COPD/reactive airway disease, depression, gout presents with lightheadedness, weakness, and diarrhea x 1 month. Pt reports for the past few weeks she has been having large bouts of diarrhea every 2-3 days following meals a/w nausea and NBNB vomiting every 3 days. PT reports last bowel movement was 7/8 before admission. Last episode of emesis a few days ago. Pt denies blood in her stool but reports that her stool has been dark black for years due to supplemental iron that she takes.  Pt states that she has continued to pass gas. Pt says that she has never had this issue before and normally has very consistent bowel movements. Pt states that shes had poor PO intake due to her diarrhea and nausea. Pt denies fever, chills, urinary sx. Pt was brought on 7/8 due to weakness and in the ED found to be hypotensive (BP 90/50s) and WAI an CKD (4.8 on admission w/ baseline Cr 2.5). In the ED pt was fluid resuscitated BP improved and Cr improving. AXR was obtained showing severe stool burden in the right colon and transverse colon, no signs of SBO now s/p manual disimpaction by the medical team on 7/8 with minimal stool evacuated and several fleet enemas and mag citrate with only mucus output w/o any stool. Surgery now consulted for assistance with obstipation management.     PAST MEDICAL & SURGICAL HISTORY:  Gout  Depression  Urinary retention  Essential hypertension  Other specified hypothyroidism  Chronic kidney disease, stage IV (severe)  History of cholecystectomy  H/O abdominal hysterectomy      MEDICATIONS  (STANDING):  ALBUTerol    90 MICROgram(s) HFA Inhaler 1 Puff(s) Inhalation four times a day  atorvastatin 40 milliGRAM(s) Oral at bedtime  Dakins Solution - 1/2 Strength 1 Application(s) Topical daily  DULoxetine 20 milliGRAM(s) Oral daily  heparin   Injectable 5000 Unit(s) SubCutaneous every 8 hours  lactulose Syrup 10 Gram(s) Oral once  levothyroxine 75 MICROGram(s) Oral daily  pantoprazole    Tablet 40 milliGRAM(s) Oral before breakfast  polyethylene glycol 3350 17 Gram(s) Oral daily  pregabalin 25 milliGRAM(s) Oral every 12 hours  senna 2 Tablet(s) Oral at bedtime  sodium chloride 0.9%. 1000 milliLiter(s) (70 mL/Hr) IV Continuous <Continuous>    MEDICATIONS  (PRN):  bisacodyl Suppository 10 milliGRAM(s) Rectal daily PRN Constipation      Allergies: No Known Allergies        FAMILY HISTORY:  No pertinent family history in first degree relatives      Vital Signs Last 24 Hrs  T(C): 36.5 (10 Jul 2020 10:04), Max: 36.9 (09 Jul 2020 15:43)  T(F): 97.7 (10 Jul 2020 10:04), Max: 98.5 (09 Jul 2020 15:43)  HR: 72 (10 Jul 2020 10:04) (71 - 73)  BP: 121/73 (10 Jul 2020 10:04) (120/61 - 129/66)  BP(mean): --  RR: 18 (10 Jul 2020 10:04) (18 - 20)  SpO2: 91% (10 Jul 2020 10:04) (91% - 93%)    I&O's Summary    09 Jul 2020 07:01  -  10 Jul 2020 07:00  --------------------------------------------------------  IN: 350 mL / OUT: 1300 mL / NET: -950 mL    Physical Exam:  General: NAD, resting comfortably  Pulmonary: normal resp effort  Abdominal: softly distended, tender to deep palpation in the LUQ and LLQ. no rebound or guarding.   Rectum: rectal exam wnl, clean, no stool burden felt  Extremities: WWP, Right heel ulcer with dressing clean dry and intact.   Neuro: A/O x 3    Lines/drains/tubes:    LABS:                        9.6    6.72  )-----------( 240      ( 10 Jul 2020 06:55 )             32.4     07-10    143  |  107  |  33<H>  ----------------------------<  83  3.9   |  23  |  3.21<H>    Ca    8.2<L>      10 Jul 2020 06:55  Phos  3.4     07-10  Mg     1.6     07-10      Cultures:  Culture Results:   GI PCR Results: NOT detected  *******Please Note:*******  GI panel PCR evaluates for:  Campylobacter, Plesiomonas shigelloides, Salmonella,  Vibrio, Yersinia enterocolitica, Enteroaggregative  Escherichia coli (EAEC), Enteropathogenic E.coli (EPEC),  Enterotoxigenic E. coli (ETEC) lt/st, Shiga-like  toxin-producing E. coli (STEC) stx1/stx2,  Shigella/ Enteroinvasive E. coli (EIEC), Cryptosporidium,  Cyclospora cayetanensis, Entamoeba histolytica,  Giardia lamblia, Adenovirus F 40/41, Astrovirus,  Norovirus GI/GII, Rotavirus A, Sapovirus (07-10 @ 01:01)      RADIOLOGY & ADDITIONAL STUDIES:      Assessment: 89y Female    Recommendations:  -   - discussed with Chief on call  - call x [phone] with questions

## 2020-07-15 DIAGNOSIS — E78.5 HYPERLIPIDEMIA, UNSPECIFIED: ICD-10-CM

## 2020-07-15 DIAGNOSIS — L97.419 NON-PRESSURE CHRONIC ULCER OF RIGHT HEEL AND MIDFOOT WITH UNSPECIFIED SEVERITY: ICD-10-CM

## 2020-07-15 DIAGNOSIS — E86.0 DEHYDRATION: ICD-10-CM

## 2020-07-15 DIAGNOSIS — N17.9 ACUTE KIDNEY FAILURE, UNSPECIFIED: ICD-10-CM

## 2020-07-15 DIAGNOSIS — R33.9 RETENTION OF URINE, UNSPECIFIED: ICD-10-CM

## 2020-07-15 DIAGNOSIS — I12.0 HYPERTENSIVE CHRONIC KIDNEY DISEASE WITH STAGE 5 CHRONIC KIDNEY DISEASE OR END STAGE RENAL DISEASE: ICD-10-CM

## 2020-07-15 DIAGNOSIS — D64.9 ANEMIA, UNSPECIFIED: ICD-10-CM

## 2020-07-15 DIAGNOSIS — K56.41 FECAL IMPACTION: ICD-10-CM

## 2020-07-15 DIAGNOSIS — J44.9 CHRONIC OBSTRUCTIVE PULMONARY DISEASE, UNSPECIFIED: ICD-10-CM

## 2020-07-15 DIAGNOSIS — M10.9 GOUT, UNSPECIFIED: ICD-10-CM

## 2020-07-15 DIAGNOSIS — N18.5 CHRONIC KIDNEY DISEASE, STAGE 5: ICD-10-CM

## 2020-07-15 DIAGNOSIS — F32.9 MAJOR DEPRESSIVE DISORDER, SINGLE EPISODE, UNSPECIFIED: ICD-10-CM

## 2020-07-15 DIAGNOSIS — E86.1 HYPOVOLEMIA: ICD-10-CM

## 2020-07-15 DIAGNOSIS — E03.9 HYPOTHYROIDISM, UNSPECIFIED: ICD-10-CM

## 2020-07-15 DIAGNOSIS — I95.9 HYPOTENSION, UNSPECIFIED: ICD-10-CM

## 2020-07-15 DIAGNOSIS — R19.7 DIARRHEA, UNSPECIFIED: ICD-10-CM

## 2020-07-29 ENCOUNTER — APPOINTMENT (OUTPATIENT)
Dept: VASCULAR SURGERY | Facility: CLINIC | Age: 85
End: 2020-07-29
Payer: MEDICARE

## 2020-07-29 PROBLEM — F32.9 MAJOR DEPRESSIVE DISORDER, SINGLE EPISODE, UNSPECIFIED: Chronic | Status: ACTIVE | Noted: 2020-07-08

## 2020-07-29 PROBLEM — M10.9 GOUT, UNSPECIFIED: Chronic | Status: ACTIVE | Noted: 2020-07-08

## 2020-07-29 PROCEDURE — 99213 OFFICE O/P EST LOW 20 MIN: CPT

## 2020-08-06 NOTE — HISTORY OF PRESENT ILLNESS
[FreeTextEntry1] : 89 y/o F w/ PMH of HTN, HLD, COPD, kidney disease, remote hx of sepsis affecting her functional status, and was evaluated May 22nd for a R heel pressure wound with necrotic cap, no signs of infection. Her aide has been cleaning the wound as instructed with betadine/peroxide every day and they have been offloading the area with the boot. during last visit, it was extensively deibrided in the office after it became malodorous. Today, the aide reports it is healing nicely and has no surrouding erythema. Visiting nurses are coming to see her at home as well. Denies any drainage, fever, chills. \par \par She is wheelchair bound and presents with home health aide. Her  stayed home today, Dr Mi who is a well known colleague of mine.

## 2020-08-06 NOTE — ASSESSMENT
[FreeTextEntry1] : 89 y/o F w/ R heel pressure wound with necrotic cap, now healing with lots of granulating tissue and minimal slough, a lot smaller in size with no erythema, drainage, chills or fevers. Heal wound minimally debrided, cleansed with peroxide/betadine. Packed with peroxide moist kerlix and wrapped. Pt and home health aide advised to continue this at home. Also, to continue to relieve pressure off site using CAM boot and pillows. Pt to call with any questions or concerns. No need for antibiotic at this point. F/u in 4 weeks or sooner if needed.

## 2020-08-06 NOTE — PHYSICAL EXAM
[Respiratory Effort] : normal respiratory effort [Normal Rate and Rhythm] : normal rate and rhythm [2+] : left 2+ [Ankle Swelling (On Exam)] : present [Ankle Swelling Bilaterally] : bilaterally  [Ankle Swelling On The Left] : moderate [Varicose Veins Of Lower Extremities] : bilaterally [Ankle Swelling On The Right] : mild [Oriented to Person] : oriented to person [Alert] : alert [Oriented to Place] : oriented to place [Oriented to Time] : oriented to time [Calm] : calm [JVD] : no jugular venous distention  [] : not present [Abdomen Tenderness] : ~T ~M No abdominal tenderness [de-identified] : calm, cooperative, sitting in wheelchair [FreeTextEntry1] : BLEs with mild to moderate swelling, particularly below the knees down to ankles. Non bulging varicose veins throughout. Weak, lightly palpable R PT pulses. Unable to palpable other pedal pulses. Adequate capillary refill throughout. R heel wound with minimal slough tissue, a lot of granulating tissue, with no surrounding erythema, no drainage, no foul odor appreciated. [de-identified] : FROM but slightly limited throughout and arthritic joints  [de-identified] : see cardiovascular section

## 2020-08-06 NOTE — PROCEDURE
[FreeTextEntry1] : Heal wound debrided, cleansed with peroxide/betadine. Packed with peroxide moist kerlix and wrapped.

## 2020-09-16 ENCOUNTER — APPOINTMENT (OUTPATIENT)
Dept: VASCULAR SURGERY | Facility: CLINIC | Age: 85
End: 2020-09-16
Payer: MEDICARE

## 2020-09-16 PROCEDURE — 99213 OFFICE O/P EST LOW 20 MIN: CPT

## 2020-09-17 NOTE — ASSESSMENT
[FreeTextEntry1] : 89 y/o F w/ R heel pressure wound with necrotic cap, now healing with lots of granulating tissue and minimal slough, a lot smaller in size with no erythema, drainage, chills or fevers. Heal wound minimally debrided, cleansed with peroxide/betadine. Packed with peroxide moist kerlix and wrapped. Pt and home health aide advised to continue this at home. Also, to continue to relieve pressure off site using CAM boot and pillows. Pt to call with any questions or concerns. She will  F/u with us  in 4 weeks or sooner if she develops any concerning symptoms. \par \par Emphasized the importance of elevation

## 2020-09-17 NOTE — PHYSICAL EXAM
[Respiratory Effort] : normal respiratory effort [Normal Rate and Rhythm] : normal rate and rhythm [2+] : right 2+ [Ankle Swelling (On Exam)] : present [Ankle Swelling On The Left] : moderate [Ankle Swelling Bilaterally] : bilaterally  [Varicose Veins Of Lower Extremities] : present [Ankle Swelling On The Right] : mild [Oriented to Person] : oriented to person [Alert] : alert [Oriented to Place] : oriented to place [Oriented to Time] : oriented to time [Calm] : calm [JVD] : no jugular venous distention  [] : not present [Abdomen Tenderness] : ~T ~M No abdominal tenderness [de-identified] : Calm, cooperative, sitting in wheelchair [de-identified] : NC/AT [FreeTextEntry1] : BLEs with mild to moderate swelling, particularly below the knees down to ankles. Non bulging varicose veins throughout. Weak, lightly palpable R PT pulses. Unable to palpable other pedal pulses. Adequate capillary refill throughout. R heel wound with minimal slough tissue, a lot of granulating tissue, skin slightly macerated, with no surrounding erythema, no drainage, no foul odor appreciated. [de-identified] : FROM but slightly limited throughout and arthritic joints  [de-identified] : see cardiovascular section

## 2020-09-17 NOTE — HISTORY OF PRESENT ILLNESS
[FreeTextEntry1] : 89 y/o F w/ PMH of HTN, HLD, COPD, kidney disease, remote hx of sepsis affecting her functional status, and was evaluated May 22nd for a R heel pressure wound with necrotic cap, no signs of infection. Her aide has been cleaning the wound as instructed with betadine/peroxide every day and they have been offloading the area with the boot. during last visit, it was extensively debrided in the office after it became malodorous. Today, the aide reports it is healing nicely and has no surrounding erythema but continues to have mild swelling. Visiting nurses are coming to see her at home as well. Denies any drainage, fever, chills. \par \par She is wheelchair bound and presents with home health aide, her  stayed home today.

## 2020-09-17 NOTE — ADDENDUM
[FreeTextEntry1] : This note was written by Trupti Lopez on 09/16/2020 acting as scribe for Dr. Segundo Oliva M.D.\par \par I, Dr. Segundo Oliva, have read and attest that all the information, medical decision making and discharge instructions within are true and accurate.

## 2020-10-14 ENCOUNTER — APPOINTMENT (OUTPATIENT)
Dept: VASCULAR SURGERY | Facility: CLINIC | Age: 85
End: 2020-10-14
Payer: MEDICARE

## 2020-10-14 DIAGNOSIS — S91.309A UNSPECIFIED OPEN WOUND, UNSPECIFIED FOOT, INITIAL ENCOUNTER: ICD-10-CM

## 2020-10-14 DIAGNOSIS — M79.89 OTHER SPECIFIED SOFT TISSUE DISORDERS: ICD-10-CM

## 2020-10-14 PROCEDURE — 99214 OFFICE O/P EST MOD 30 MIN: CPT

## 2020-10-14 RX ORDER — SILVER SULFADIAZINE 10 MG/G
1 CREAM TOPICAL DAILY
Qty: 1 | Refills: 0 | Status: COMPLETED | COMMUNITY
Start: 2020-09-16 | End: 2020-10-14

## 2020-10-14 RX ORDER — GEL DRESSING 2" X 2"
BANDAGE TOPICAL
Qty: 3 | Refills: 1 | Status: ACTIVE | COMMUNITY
Start: 2020-10-14 | End: 1900-01-01

## 2020-10-16 NOTE — HISTORY OF PRESENT ILLNESS
[FreeTextEntry1] : 89 y/o F w/ PMH of HTN, HLD, COPD, kidney disease, remote hx of sepsis affecting her functional status, and was evaluated May 22nd for a R heel pressure wound with necrotic cap, no signs of infection. Her aide has been cleaning the wound as instructed with betadine/peroxide daily and they have been offloading the area with the boot. Today, the aide reports it is healing nicely and has no surrounding erythema but continues to have some swelling and some clear drainage. Visiting nurses are coming to see her at home as well. Denies any drainage, fever, chills. \par \par She is wheelchair bound and presents with home health aide, her  stayed home today.

## 2020-10-16 NOTE — PROCEDURE
[FreeTextEntry1] : Heal wound slightly debrided, cleansed with peroxide/Betadine. Packed with kerlix and wrapped.

## 2020-10-16 NOTE — ADDENDUM
[FreeTextEntry1] : This note was written by Trupti Lopez on 10/14/2020 acting as scribe for Segundo Hope M.D.\par \par I, Segundo Hope  have read and attest that all the information, medical decision making and discharge instructions within are true and accurate.

## 2020-10-16 NOTE — ASSESSMENT
[FreeTextEntry1] : 89 y/o F w/ R heel pressure wound with necrotic cap, now healing with lots of granulating tissue and minimal slough, a lot smaller in size with no erythema, drainage, chills or fevers. Heal wound cleansed with peroxide/betadine. Packed with kerlix and wrapped. Pt and home health aide advised to continue this at home. Also, to continue to relieve pressure off site using CAM boot and pillows. Pt to call with any questions or concerns. Emphasized the importance of elevation. She will  F/u with us in 4-6 weeks or sooner if she develops any concerning symptoms. \par \par

## 2020-10-16 NOTE — PHYSICAL EXAM
[Respiratory Effort] : normal respiratory effort [Normal Rate and Rhythm] : normal rate and rhythm [Ankle Swelling (On Exam)] : present [Ankle Swelling Bilaterally] : bilaterally  [2+] : left 2+ [Varicose Veins Of Lower Extremities] : bilaterally [Ankle Swelling On The Left] : moderate [Ankle Swelling On The Right] : mild [Oriented to Person] : oriented to person [Oriented to Place] : oriented to place [Alert] : alert [Calm] : calm [Oriented to Time] : oriented to time [] : not present [Abdomen Tenderness] : ~T ~M No abdominal tenderness [JVD] : no jugular venous distention  [de-identified] : Calm, cooperative, sitting in wheelchair [de-identified] : NC/AT [de-identified] : FROM but slightly limited throughout and arthritic joints  [FreeTextEntry1] : BLEs with mild to moderate swelling, particularly below the knees down to ankles. Non bulging varicose veins throughout. Weak, lightly palpable R PT pulses. Unable to palpable other pedal pulses. Adequate capillary refill throughout. R heel wound with minimal slough tissue, with great number of granulating tissue, skin slightly macerated, with no surrounding erythema, serous drainage minimal on dressing, no foul odor appreciated, no signs of infection. Significant smaller from previous visit. [de-identified] : see cardiovascular section

## 2020-12-02 ENCOUNTER — APPOINTMENT (OUTPATIENT)
Dept: VASCULAR SURGERY | Facility: CLINIC | Age: 85
End: 2020-12-02
Payer: MEDICARE

## 2020-12-02 PROCEDURE — 99214 OFFICE O/P EST MOD 30 MIN: CPT

## 2020-12-10 NOTE — ASSESSMENT
[FreeTextEntry1] : 89 y/o F w/ R heel pressure wound with necrotic cap s/p office debridements, now almost healed. On exam, RLE minimum  swelling. Adequate capillary refill. R heel wound almost healed, surrounding skin well moisturized, no skin breakdown, no foul odor appreciated, no signs of infection. Small superficial abrasion to the 2nd toe clean, likely from trauma. Pt and home health aide advised to continue this at home. Also, to continue to relieve pressure off site using a loose white sock and pillows. Pt to call with any questions or concerns. Emphasized the importance of elevation. She will  F/u with us in 4-6 weeks or sooner if she develops any concerning symptoms. \par \par

## 2020-12-10 NOTE — PHYSICAL EXAM
[Respiratory Effort] : normal respiratory effort [Normal Rate and Rhythm] : normal rate and rhythm [2+] : left 2+ [Ankle Swelling (On Exam)] : present [Varicose Veins Of Lower Extremities] : bilaterally [Alert] : alert [Oriented to Person] : oriented to person [Oriented to Place] : oriented to place [Oriented to Time] : oriented to time [Calm] : calm [Ankle Swelling On The Right] : mild [JVD] : no jugular venous distention  [] : not present [Abdomen Tenderness] : ~T ~M No abdominal tenderness [de-identified] : Calm, cooperative, sitting in wheelchair [de-identified] : NC/AT [FreeTextEntry1] : RLE with minimum swelling, particularly below the knee down to ankle. Non bulging varicose veins throughout. Weak, lightly palpable R PT pulses. Adequate capillary refill. R heel wound almost completely healed, surrounding skin well moisturized, no new skin breakdown, no foul odor appreciated, no signs of infection.  Small superficial abrasion to the 2nd toe, no signs of infection, lielly from trauma [de-identified] : FROM but slightly limited throughout and arthritic joints  [de-identified] : see cardiovascular section

## 2020-12-10 NOTE — HISTORY OF PRESENT ILLNESS
[FreeTextEntry1] : 89 y/o F w/ PMH of HTN, HLD, COPD, kidney disease, remote hx of sepsis affecting her functional status, she was evaluated May 22nd for a R heel pressure wound with necrotic cap, no signs of infection. Her aide has been cleaning the wound as instructed with betadine/peroxide daily. She has been wrapping RLE and noticed some swelling to her toes with a small superficial ulcer over the 2nd toe. The aide reports pt wound is healing nicely and has no surrounding erythema but continues to have minimal swelling. Visiting nurses are coming to see her at home as well. Denies any drainage, fever, chills. \par \par She is wheelchair bound and presents with home health aide, her  stayed home today.

## 2020-12-10 NOTE — ADDENDUM
[FreeTextEntry1] : This note was written by Trupti Lopez on 12/02/2020 acting as scribe for Segundo Alves M.D.\par \par I, Segundo Hope  have read and attest that all the information, medical decision making and discharge instructions within are true and accurate.

## 2020-12-14 NOTE — ED ADULT TRIAGE NOTE - BANDS:
Pt arrives via triage with laceration to left, upper, inner arm - approximately 1\" long, bleeding controlled. States he was jumping on bed and scratched it on a mattress spring. Mother states she does not vaccinate - refusing tetanus at this time.
Fall Risk;

## 2021-02-03 ENCOUNTER — APPOINTMENT (OUTPATIENT)
Dept: VASCULAR SURGERY | Facility: CLINIC | Age: 86
End: 2021-02-03

## 2021-02-17 ENCOUNTER — APPOINTMENT (OUTPATIENT)
Dept: VASCULAR SURGERY | Facility: CLINIC | Age: 86
End: 2021-02-17
Payer: MEDICARE

## 2021-02-17 PROCEDURE — 99214 OFFICE O/P EST MOD 30 MIN: CPT

## 2021-02-20 NOTE — ASSESSMENT
[FreeTextEntry1] : 89 y/o F w/ R heel pressure wound with necrotic cap s/p office debridement, now healed. On exam, RLE w/ minimal ankle swelling. Adequate capillary refill. R heel wound nicely healed, surrounding skin well moisturized, no skin breakdown, no signs of infection. Pt and home health aide advised to continue moisturizing skin daily. Continue to relieve pressure off site using a loose white sock and pillows. Elevate legs at night after activities. She will follow up with us here PRN.

## 2021-02-20 NOTE — PHYSICAL EXAM
[Respiratory Effort] : normal respiratory effort [Normal Rate and Rhythm] : normal rate and rhythm [2+] : left 2+ [Varicose Veins Of Lower Extremities] : bilaterally [Ankle Swelling On The Right] : mild [Alert] : alert [Oriented to Person] : oriented to person [Oriented to Place] : oriented to place [Oriented to Time] : oriented to time [Calm] : calm [1+] : right 1+ [Ankle Swelling (On Exam)] : present [JVD] : no jugular venous distention  [] : not present [Abdomen Tenderness] : ~T ~M No abdominal tenderness [de-identified] : Calm, cooperative, sitting in wheelchair [de-identified] : NC/AT [de-identified] : Supple  [FreeTextEntry1] : RLE with minimal ankle swelling. Non bulging varicose veins throughout the leg. Weak, lightly palpable R PT pulses. Adequate capillary refill. R heel wound healed with dry callus over it, surrounding skin well moisturized. No new skin breakdown, no foul odor appreciated, no signs of infection.  [de-identified] : FROM but slightly limited throughout and arthritic joints  [de-identified] : see cardiovascular section

## 2021-02-20 NOTE — HISTORY OF PRESENT ILLNESS
[FreeTextEntry1] : 89 y/o F with  PMH of HTN, HLD, COPD, kidney disease, remote hx of sepsis affecting her functional status. She was evaluated May 22nd for a R heel pressure wound with necrotic cap, no signs of infection. The wound was debrided in the office a few times and the home aid plus VNS have been completing daily wound care. Also, keeping area with no pressure. \par \par Today, her aide reports she has has been applying Aquaphor cream over the legs daily to keep skin moisturized. The aide reports pt's wound has healed nicely with no surrounding swelling but has a big scab that has been somewhat bothersome. Denies any drainage, fever, chills. \par \par She is wheelchair bound and presents with home health aide, her  stayed home today.

## 2021-02-20 NOTE — PROCEDURE
[FreeTextEntry1] : RLE wound nicely healed with small dry callus. Callus was debrided with no complications, drainage or bleeding. Area now healed with mild dry skin.

## 2021-02-20 NOTE — ADDENDUM
[FreeTextEntry1] : This note was written by Trupti Lopez on 02/17/2021 acting as scribe for Segundo Alves M.D.\par \par I, Segundo Hope  have read and attest that all the information, medical decision making and discharge instructions within are true and accurate.

## 2022-01-01 ENCOUNTER — APPOINTMENT (OUTPATIENT)
Dept: VASCULAR SURGERY | Facility: CLINIC | Age: 87
End: 2022-01-01
Payer: MEDICARE

## 2022-01-01 ENCOUNTER — INPATIENT (INPATIENT)
Facility: HOSPITAL | Age: 87
LOS: 0 days | Discharge: HOME | DRG: 315 | End: 2022-03-11
Attending: STUDENT IN AN ORGANIZED HEALTH CARE EDUCATION/TRAINING PROGRAM | Admitting: STUDENT IN AN ORGANIZED HEALTH CARE EDUCATION/TRAINING PROGRAM
Payer: COMMERCIAL

## 2022-01-01 ENCOUNTER — NON-APPOINTMENT (OUTPATIENT)
Age: 87
End: 2022-01-01

## 2022-01-01 ENCOUNTER — APPOINTMENT (OUTPATIENT)
Dept: VASCULAR SURGERY | Facility: CLINIC | Age: 87
End: 2022-01-01

## 2022-01-01 ENCOUNTER — INPATIENT (INPATIENT)
Facility: HOSPITAL | Age: 87
LOS: 7 days | DRG: 871 | End: 2022-05-31
Attending: INTERNAL MEDICINE | Admitting: INTERNAL MEDICINE
Payer: MEDICARE

## 2022-01-01 VITALS
WEIGHT: 150 LBS | HEIGHT: 64 IN | SYSTOLIC BLOOD PRESSURE: 107 MMHG | DIASTOLIC BLOOD PRESSURE: 68 MMHG | BODY MASS INDEX: 25.61 KG/M2 | HEART RATE: 84 BPM

## 2022-01-01 VITALS
DIASTOLIC BLOOD PRESSURE: 53 MMHG | RESPIRATION RATE: 18 BRPM | TEMPERATURE: 98 F | HEART RATE: 106 BPM | OXYGEN SATURATION: 96 % | SYSTOLIC BLOOD PRESSURE: 98 MMHG | WEIGHT: 149.91 LBS | HEIGHT: 63 IN

## 2022-01-01 VITALS
OXYGEN SATURATION: 95 % | DIASTOLIC BLOOD PRESSURE: 60 MMHG | HEART RATE: 79 BPM | RESPIRATION RATE: 18 BRPM | SYSTOLIC BLOOD PRESSURE: 144 MMHG | TEMPERATURE: 98 F

## 2022-01-01 VITALS
DIASTOLIC BLOOD PRESSURE: 131 MMHG | WEIGHT: 192.46 LBS | SYSTOLIC BLOOD PRESSURE: 266 MMHG | OXYGEN SATURATION: 95 % | HEART RATE: 105 BPM | RESPIRATION RATE: 22 BRPM | HEIGHT: 63 IN

## 2022-01-01 VITALS — HEART RATE: 122 BPM

## 2022-01-01 VITALS
BODY MASS INDEX: 25.61 KG/M2 | WEIGHT: 150 LBS | HEART RATE: 78 BPM | DIASTOLIC BLOOD PRESSURE: 68 MMHG | HEIGHT: 64 IN | SYSTOLIC BLOOD PRESSURE: 105 MMHG

## 2022-01-01 DIAGNOSIS — R82.71 BACTERIURIA: ICD-10-CM

## 2022-01-01 DIAGNOSIS — R19.7 DIARRHEA, UNSPECIFIED: ICD-10-CM

## 2022-01-01 DIAGNOSIS — J96.01 ACUTE RESPIRATORY FAILURE WITH HYPOXIA: ICD-10-CM

## 2022-01-01 DIAGNOSIS — F32.9 MAJOR DEPRESSIVE DISORDER, SINGLE EPISODE, UNSPECIFIED: ICD-10-CM

## 2022-01-01 DIAGNOSIS — Z90.710 ACQUIRED ABSENCE OF BOTH CERVIX AND UTERUS: Chronic | ICD-10-CM

## 2022-01-01 DIAGNOSIS — Z71.89 OTHER SPECIFIED COUNSELING: ICD-10-CM

## 2022-01-01 DIAGNOSIS — J44.9 CHRONIC OBSTRUCTIVE PULMONARY DISEASE, UNSPECIFIED: ICD-10-CM

## 2022-01-01 DIAGNOSIS — I95.9 HYPOTENSION, UNSPECIFIED: ICD-10-CM

## 2022-01-01 DIAGNOSIS — N18.9 CHRONIC KIDNEY DISEASE, UNSPECIFIED: ICD-10-CM

## 2022-01-01 DIAGNOSIS — R52 PAIN, UNSPECIFIED: ICD-10-CM

## 2022-01-01 DIAGNOSIS — Z98.890 OTHER SPECIFIED POSTPROCEDURAL STATES: Chronic | ICD-10-CM

## 2022-01-01 DIAGNOSIS — R53.81 OTHER MALAISE: ICD-10-CM

## 2022-01-01 DIAGNOSIS — J44.1 CHRONIC OBSTRUCTIVE PULMONARY DISEASE WITH (ACUTE) EXACERBATION: ICD-10-CM

## 2022-01-01 DIAGNOSIS — J06.9 ACUTE UPPER RESPIRATORY INFECTION, UNSPECIFIED: ICD-10-CM

## 2022-01-01 DIAGNOSIS — F32.A DEPRESSION, UNSPECIFIED: ICD-10-CM

## 2022-01-01 DIAGNOSIS — Z98.89 OTHER SPECIFIED POSTPROCEDURAL STATES: Chronic | ICD-10-CM

## 2022-01-01 DIAGNOSIS — L89.619 PRESSURE ULCER OF RIGHT HEEL, UNSPECIFIED STAGE: ICD-10-CM

## 2022-01-01 DIAGNOSIS — A04.5 CAMPYLOBACTER ENTERITIS: ICD-10-CM

## 2022-01-01 DIAGNOSIS — A41.9 SEPSIS, UNSPECIFIED ORGANISM: ICD-10-CM

## 2022-01-01 DIAGNOSIS — Z51.5 ENCOUNTER FOR PALLIATIVE CARE: ICD-10-CM

## 2022-01-01 DIAGNOSIS — N39.0 URINARY TRACT INFECTION, SITE NOT SPECIFIED: ICD-10-CM

## 2022-01-01 DIAGNOSIS — E03.9 HYPOTHYROIDISM, UNSPECIFIED: ICD-10-CM

## 2022-01-01 DIAGNOSIS — I50.20 UNSPECIFIED SYSTOLIC (CONGESTIVE) HEART FAILURE: ICD-10-CM

## 2022-01-01 DIAGNOSIS — R63.8 OTHER SYMPTOMS AND SIGNS CONCERNING FOOD AND FLUID INTAKE: ICD-10-CM

## 2022-01-01 DIAGNOSIS — M10.9 GOUT, UNSPECIFIED: ICD-10-CM

## 2022-01-01 DIAGNOSIS — I12.9 HYPERTENSIVE CHRONIC KIDNEY DISEASE WITH STAGE 1 THROUGH STAGE 4 CHRONIC KIDNEY DISEASE, OR UNSPECIFIED CHRONIC KIDNEY DISEASE: ICD-10-CM

## 2022-01-01 DIAGNOSIS — I10 ESSENTIAL (PRIMARY) HYPERTENSION: ICD-10-CM

## 2022-01-01 DIAGNOSIS — L97.409 NON-PRESSURE CHRONIC ULCER OF UNSPECIFIED HEEL AND MIDFOOT WITH UNSPECIFIED SEVERITY: ICD-10-CM

## 2022-01-01 DIAGNOSIS — Z29.9 ENCOUNTER FOR PROPHYLACTIC MEASURES, UNSPECIFIED: ICD-10-CM

## 2022-01-01 LAB
-  AMPICILLIN/SULBACTAM: SIGNIFICANT CHANGE UP
-  AMPICILLIN: SIGNIFICANT CHANGE UP
-  CEFAZOLIN: SIGNIFICANT CHANGE UP
-  CEFTRIAXONE: SIGNIFICANT CHANGE UP
-  CIPROFLOXACIN: SIGNIFICANT CHANGE UP
-  CLINDAMYCIN: SIGNIFICANT CHANGE UP
-  CLINDAMYCIN: SIGNIFICANT CHANGE UP
-  COAGULASE NEGATIVE STAPHYLOCOCCUS: SIGNIFICANT CHANGE UP
-  ERTAPENEM: SIGNIFICANT CHANGE UP
-  ERYTHROMYCIN: SIGNIFICANT CHANGE UP
-  ERYTHROMYCIN: SIGNIFICANT CHANGE UP
-  GENTAMICIN: SIGNIFICANT CHANGE UP
-  LINEZOLID: SIGNIFICANT CHANGE UP
-  LINEZOLID: SIGNIFICANT CHANGE UP
-  NITROFURANTOIN: SIGNIFICANT CHANGE UP
-  OXACILLIN: SIGNIFICANT CHANGE UP
-  OXACILLIN: SIGNIFICANT CHANGE UP
-  PIPERACILLIN/TAZOBACTAM: SIGNIFICANT CHANGE UP
-  RIFAMPIN: SIGNIFICANT CHANGE UP
-  RIFAMPIN: SIGNIFICANT CHANGE UP
-  TOBRAMYCIN: SIGNIFICANT CHANGE UP
-  TRIMETHOPRIM/SULFAMETHOXAZOLE: SIGNIFICANT CHANGE UP
-  VANCOMYCIN: SIGNIFICANT CHANGE UP
-  VANCOMYCIN: SIGNIFICANT CHANGE UP
ALBUMIN SERPL ELPH-MCNC: 2.9 G/DL — LOW (ref 3.3–5)
ALBUMIN SERPL ELPH-MCNC: 3 G/DL — LOW (ref 3.3–5)
ALBUMIN SERPL ELPH-MCNC: 3.1 G/DL — LOW (ref 3.3–5)
ALBUMIN SERPL ELPH-MCNC: 3.2 G/DL — LOW (ref 3.3–5)
ALBUMIN SERPL ELPH-MCNC: 3.2 G/DL — LOW (ref 3.3–5)
ALBUMIN SERPL ELPH-MCNC: 3.6 G/DL — SIGNIFICANT CHANGE UP (ref 3.3–5)
ALBUMIN SERPL ELPH-MCNC: 3.7 G/DL — SIGNIFICANT CHANGE UP (ref 3.3–5)
ALP SERPL-CCNC: 149 U/L — HIGH (ref 40–120)
ALP SERPL-CCNC: 168 U/L — HIGH (ref 40–120)
ALP SERPL-CCNC: 173 U/L — HIGH (ref 40–120)
ALP SERPL-CCNC: 60 U/L — SIGNIFICANT CHANGE UP (ref 40–120)
ALP SERPL-CCNC: 64 U/L — SIGNIFICANT CHANGE UP (ref 40–120)
ALP SERPL-CCNC: 73 U/L — SIGNIFICANT CHANGE UP (ref 40–120)
ALP SERPL-CCNC: 90 U/L — SIGNIFICANT CHANGE UP (ref 40–120)
ALT FLD-CCNC: 10 U/L — SIGNIFICANT CHANGE UP (ref 10–45)
ALT FLD-CCNC: 10 U/L — SIGNIFICANT CHANGE UP (ref 10–45)
ALT FLD-CCNC: 13 U/L — SIGNIFICANT CHANGE UP (ref 10–45)
ALT FLD-CCNC: 30 U/L — SIGNIFICANT CHANGE UP (ref 10–45)
ALT FLD-CCNC: 43 U/L — SIGNIFICANT CHANGE UP (ref 10–45)
ALT FLD-CCNC: 44 U/L — SIGNIFICANT CHANGE UP (ref 10–45)
ALT FLD-CCNC: 9 U/L — LOW (ref 10–45)
ANION GAP SERPL CALC-SCNC: 12 MMOL/L — SIGNIFICANT CHANGE UP (ref 5–17)
ANION GAP SERPL CALC-SCNC: 12 MMOL/L — SIGNIFICANT CHANGE UP (ref 5–17)
ANION GAP SERPL CALC-SCNC: 14 MMOL/L — SIGNIFICANT CHANGE UP (ref 5–17)
ANION GAP SERPL CALC-SCNC: 14 MMOL/L — SIGNIFICANT CHANGE UP (ref 5–17)
ANION GAP SERPL CALC-SCNC: 16 MMOL/L — SIGNIFICANT CHANGE UP (ref 5–17)
ANION GAP SERPL CALC-SCNC: 17 MMOL/L — SIGNIFICANT CHANGE UP (ref 5–17)
ANION GAP SERPL CALC-SCNC: 17 MMOL/L — SIGNIFICANT CHANGE UP (ref 5–17)
ANION GAP SERPL CALC-SCNC: 19 MMOL/L — HIGH (ref 5–17)
ANION GAP SERPL CALC-SCNC: 24 MMOL/L — HIGH (ref 5–17)
ANION GAP SERPL CALC-SCNC: 9 MMOL/L — SIGNIFICANT CHANGE UP (ref 5–17)
ANISOCYTOSIS BLD QL: SLIGHT — SIGNIFICANT CHANGE UP
ANISOCYTOSIS BLD QL: SLIGHT — SIGNIFICANT CHANGE UP
APPEARANCE UR: ABNORMAL
APPEARANCE UR: CLEAR — SIGNIFICANT CHANGE UP
APPEARANCE UR: CLEAR — SIGNIFICANT CHANGE UP
APTT BLD: 27.9 SEC — SIGNIFICANT CHANGE UP (ref 27.5–35.5)
APTT BLD: 30.4 SEC — SIGNIFICANT CHANGE UP (ref 27.5–35.5)
AST SERPL-CCNC: 14 U/L — SIGNIFICANT CHANGE UP (ref 10–40)
AST SERPL-CCNC: 21 U/L — SIGNIFICANT CHANGE UP (ref 10–40)
AST SERPL-CCNC: 27 U/L — SIGNIFICANT CHANGE UP (ref 10–40)
AST SERPL-CCNC: 43 U/L — HIGH (ref 10–40)
AST SERPL-CCNC: 49 U/L — HIGH (ref 10–40)
AST SERPL-CCNC: 56 U/L — HIGH (ref 10–40)
AST SERPL-CCNC: 73 U/L — HIGH (ref 10–40)
BACTERIA # UR AUTO: ABNORMAL /HPF
BACTERIA # UR AUTO: PRESENT /HPF
BACTERIA # UR AUTO: PRESENT /HPF
BASE EXCESS BLDA CALC-SCNC: -1.7 MMOL/L — SIGNIFICANT CHANGE UP (ref -2–3)
BASE EXCESS BLDA CALC-SCNC: -2.2 MMOL/L — LOW (ref -2–3)
BASE EXCESS BLDA CALC-SCNC: -2.5 MMOL/L — LOW (ref -2–3)
BASE EXCESS BLDA CALC-SCNC: -3.1 MMOL/L — LOW (ref -2–3)
BASE EXCESS BLDA CALC-SCNC: -6.4 MMOL/L — LOW (ref -2–3)
BASE EXCESS BLDA CALC-SCNC: -6.6 MMOL/L — LOW (ref -2–3)
BASE EXCESS BLDA CALC-SCNC: 1 MMOL/L — SIGNIFICANT CHANGE UP (ref -2–3)
BASE EXCESS BLDV CALC-SCNC: 3.5 MMOL/L — HIGH (ref -2–3)
BASE EXCESS BLDV CALC-SCNC: 6.1 MMOL/L — HIGH (ref -2–3)
BASOPHILS # BLD AUTO: 0 K/UL — SIGNIFICANT CHANGE UP (ref 0–0.2)
BASOPHILS # BLD AUTO: 0 K/UL — SIGNIFICANT CHANGE UP (ref 0–0.2)
BASOPHILS # BLD AUTO: 0.02 K/UL — SIGNIFICANT CHANGE UP (ref 0–0.2)
BASOPHILS # BLD AUTO: 0.03 K/UL — SIGNIFICANT CHANGE UP (ref 0–0.2)
BASOPHILS # BLD AUTO: 0.03 K/UL — SIGNIFICANT CHANGE UP (ref 0–0.2)
BASOPHILS # BLD AUTO: 0.04 K/UL — SIGNIFICANT CHANGE UP (ref 0–0.2)
BASOPHILS # BLD AUTO: 0.04 K/UL — SIGNIFICANT CHANGE UP (ref 0–0.2)
BASOPHILS # BLD AUTO: 0.05 K/UL — SIGNIFICANT CHANGE UP (ref 0–0.2)
BASOPHILS NFR BLD AUTO: 0 % — SIGNIFICANT CHANGE UP (ref 0–2)
BASOPHILS NFR BLD AUTO: 0 % — SIGNIFICANT CHANGE UP (ref 0–2)
BASOPHILS NFR BLD AUTO: 0.1 % — SIGNIFICANT CHANGE UP (ref 0–2)
BASOPHILS NFR BLD AUTO: 0.2 % — SIGNIFICANT CHANGE UP (ref 0–2)
BASOPHILS NFR BLD AUTO: 0.3 % — SIGNIFICANT CHANGE UP (ref 0–2)
BASOPHILS NFR BLD AUTO: 0.3 % — SIGNIFICANT CHANGE UP (ref 0–2)
BASOPHILS NFR BLD AUTO: 0.4 % — SIGNIFICANT CHANGE UP (ref 0–2)
BASOPHILS NFR BLD AUTO: 0.4 % — SIGNIFICANT CHANGE UP (ref 0–2)
BILIRUB SERPL-MCNC: 0.4 MG/DL — SIGNIFICANT CHANGE UP (ref 0.2–1.2)
BILIRUB SERPL-MCNC: 0.5 MG/DL — SIGNIFICANT CHANGE UP (ref 0.2–1.2)
BILIRUB SERPL-MCNC: 0.5 MG/DL — SIGNIFICANT CHANGE UP (ref 0.2–1.2)
BILIRUB SERPL-MCNC: 0.6 MG/DL — SIGNIFICANT CHANGE UP (ref 0.2–1.2)
BILIRUB SERPL-MCNC: 0.7 MG/DL — SIGNIFICANT CHANGE UP (ref 0.2–1.2)
BILIRUB UR-MCNC: NEGATIVE — SIGNIFICANT CHANGE UP
BLD GP AB SCN SERPL QL: NEGATIVE — SIGNIFICANT CHANGE UP
BUN SERPL-MCNC: 39 MG/DL — HIGH (ref 7–23)
BUN SERPL-MCNC: 40 MG/DL — HIGH (ref 7–23)
BUN SERPL-MCNC: 41 MG/DL — HIGH (ref 7–23)
BUN SERPL-MCNC: 45 MG/DL — HIGH (ref 7–23)
BUN SERPL-MCNC: 48 MG/DL — HIGH (ref 7–23)
BUN SERPL-MCNC: 53 MG/DL — HIGH (ref 7–23)
BUN SERPL-MCNC: 56 MG/DL — HIGH (ref 7–23)
BUN SERPL-MCNC: 59 MG/DL — HIGH (ref 7–23)
BUN SERPL-MCNC: 59 MG/DL — HIGH (ref 7–23)
BUN SERPL-MCNC: 61 MG/DL — HIGH (ref 7–23)
BURR CELLS BLD QL SMEAR: PRESENT — SIGNIFICANT CHANGE UP
C DIFF GDH STL QL: NEGATIVE — SIGNIFICANT CHANGE UP
C DIFF GDH STL QL: SIGNIFICANT CHANGE UP
CA-I SERPL-SCNC: 1.12 MMOL/L — LOW (ref 1.15–1.33)
CA-I SERPL-SCNC: 1.13 MMOL/L — LOW (ref 1.15–1.33)
CALCIUM SERPL-MCNC: 7.6 MG/DL — LOW (ref 8.4–10.5)
CALCIUM SERPL-MCNC: 7.8 MG/DL — LOW (ref 8.4–10.5)
CALCIUM SERPL-MCNC: 8 MG/DL — LOW (ref 8.4–10.5)
CALCIUM SERPL-MCNC: 8.5 MG/DL — SIGNIFICANT CHANGE UP (ref 8.4–10.5)
CALCIUM SERPL-MCNC: 8.5 MG/DL — SIGNIFICANT CHANGE UP (ref 8.4–10.5)
CALCIUM SERPL-MCNC: 8.6 MG/DL — SIGNIFICANT CHANGE UP (ref 8.4–10.5)
CALCIUM SERPL-MCNC: 8.8 MG/DL — SIGNIFICANT CHANGE UP (ref 8.4–10.5)
CALCIUM SERPL-MCNC: 8.8 MG/DL — SIGNIFICANT CHANGE UP (ref 8.4–10.5)
CALCIUM SERPL-MCNC: 9 MG/DL — SIGNIFICANT CHANGE UP (ref 8.4–10.5)
CALCIUM SERPL-MCNC: 9 MG/DL — SIGNIFICANT CHANGE UP (ref 8.4–10.5)
CHLORIDE SERPL-SCNC: 101 MMOL/L — SIGNIFICANT CHANGE UP (ref 96–108)
CHLORIDE SERPL-SCNC: 102 MMOL/L — SIGNIFICANT CHANGE UP (ref 96–108)
CHLORIDE SERPL-SCNC: 102 MMOL/L — SIGNIFICANT CHANGE UP (ref 96–108)
CHLORIDE SERPL-SCNC: 103 MMOL/L — SIGNIFICANT CHANGE UP (ref 96–108)
CHLORIDE SERPL-SCNC: 103 MMOL/L — SIGNIFICANT CHANGE UP (ref 96–108)
CHLORIDE SERPL-SCNC: 104 MMOL/L — SIGNIFICANT CHANGE UP (ref 96–108)
CHLORIDE SERPL-SCNC: 104 MMOL/L — SIGNIFICANT CHANGE UP (ref 96–108)
CHLORIDE SERPL-SCNC: 105 MMOL/L — SIGNIFICANT CHANGE UP (ref 96–108)
CHLORIDE SERPL-SCNC: 106 MMOL/L — SIGNIFICANT CHANGE UP (ref 96–108)
CHLORIDE SERPL-SCNC: 90 MMOL/L — LOW (ref 96–108)
CO2 BLDA-SCNC: 22 MMOL/L — SIGNIFICANT CHANGE UP (ref 19–24)
CO2 BLDA-SCNC: 23 MMOL/L — SIGNIFICANT CHANGE UP (ref 19–24)
CO2 BLDA-SCNC: 24 MMOL/L — SIGNIFICANT CHANGE UP (ref 19–24)
CO2 BLDA-SCNC: 25 MMOL/L — HIGH (ref 19–24)
CO2 BLDA-SCNC: 29 MMOL/L — HIGH (ref 19–24)
CO2 BLDV-SCNC: 32.5 MMOL/L — HIGH (ref 22–26)
CO2 BLDV-SCNC: 35.6 MMOL/L — HIGH (ref 22–26)
CO2 SERPL-SCNC: 17 MMOL/L — LOW (ref 22–31)
CO2 SERPL-SCNC: 18 MMOL/L — LOW (ref 22–31)
CO2 SERPL-SCNC: 18 MMOL/L — LOW (ref 22–31)
CO2 SERPL-SCNC: 19 MMOL/L — LOW (ref 22–31)
CO2 SERPL-SCNC: 20 MMOL/L — LOW (ref 22–31)
CO2 SERPL-SCNC: 21 MMOL/L — LOW (ref 22–31)
CO2 SERPL-SCNC: 21 MMOL/L — LOW (ref 22–31)
CO2 SERPL-SCNC: 23 MMOL/L — SIGNIFICANT CHANGE UP (ref 22–31)
CO2 SERPL-SCNC: 26 MMOL/L — SIGNIFICANT CHANGE UP (ref 22–31)
CO2 SERPL-SCNC: 31 MMOL/L — SIGNIFICANT CHANGE UP (ref 22–31)
COLOR SPEC: YELLOW — SIGNIFICANT CHANGE UP
COMMENT - URINE: SIGNIFICANT CHANGE UP
CREAT ?TM UR-MCNC: 65 MG/DL — SIGNIFICANT CHANGE UP
CREAT SERPL-MCNC: 2.83 MG/DL — HIGH (ref 0.5–1.3)
CREAT SERPL-MCNC: 2.98 MG/DL — HIGH (ref 0.5–1.3)
CREAT SERPL-MCNC: 3.03 MG/DL — HIGH (ref 0.5–1.3)
CREAT SERPL-MCNC: 3.1 MG/DL — HIGH (ref 0.5–1.3)
CREAT SERPL-MCNC: 3.15 MG/DL — HIGH (ref 0.5–1.3)
CREAT SERPL-MCNC: 3.18 MG/DL — HIGH (ref 0.5–1.3)
CREAT SERPL-MCNC: 3.24 MG/DL — HIGH (ref 0.5–1.3)
CREAT SERPL-MCNC: 3.41 MG/DL — HIGH (ref 0.5–1.3)
CREAT SERPL-MCNC: 3.86 MG/DL — HIGH (ref 0.5–1.3)
CREAT SERPL-MCNC: 3.92 MG/DL — HIGH (ref 0.5–1.3)
CULTURE RESULTS: SIGNIFICANT CHANGE UP
DIFF PNL FLD: ABNORMAL
EGFR: 10 ML/MIN/1.73M2 — LOW
EGFR: 10 ML/MIN/1.73M2 — LOW
EGFR: 12 ML/MIN/1.73M2 — LOW
EGFR: 13 ML/MIN/1.73M2 — LOW
EGFR: 14 ML/MIN/1.73M2 — LOW
EGFR: 15 ML/MIN/1.73M2 — LOW
EOSINOPHIL # BLD AUTO: 0 K/UL — SIGNIFICANT CHANGE UP (ref 0–0.5)
EOSINOPHIL # BLD AUTO: 0.02 K/UL — SIGNIFICANT CHANGE UP (ref 0–0.5)
EOSINOPHIL # BLD AUTO: 0.02 K/UL — SIGNIFICANT CHANGE UP (ref 0–0.5)
EOSINOPHIL # BLD AUTO: 0.05 K/UL — SIGNIFICANT CHANGE UP (ref 0–0.5)
EOSINOPHIL # BLD AUTO: 0.06 K/UL — SIGNIFICANT CHANGE UP (ref 0–0.5)
EOSINOPHIL NFR BLD AUTO: 0 % — SIGNIFICANT CHANGE UP (ref 0–6)
EOSINOPHIL NFR BLD AUTO: 0.2 % — SIGNIFICANT CHANGE UP (ref 0–6)
EOSINOPHIL NFR BLD AUTO: 0.2 % — SIGNIFICANT CHANGE UP (ref 0–6)
EOSINOPHIL NFR BLD AUTO: 0.5 % — SIGNIFICANT CHANGE UP (ref 0–6)
EOSINOPHIL NFR BLD AUTO: 0.6 % — SIGNIFICANT CHANGE UP (ref 0–6)
EPI CELLS # UR: SIGNIFICANT CHANGE UP /HPF (ref 0–5)
FLUAV AG NPH QL: DETECTED — SIGNIFICANT CHANGE UP
FLUBV AG NPH QL: SIGNIFICANT CHANGE UP
FOLATE SERPL-MCNC: >20 NG/ML — SIGNIFICANT CHANGE UP
GAS PNL BLDA: SIGNIFICANT CHANGE UP
GAS PNL BLDV: 131 MMOL/L — LOW (ref 136–145)
GAS PNL BLDV: 131 MMOL/L — LOW (ref 136–145)
GAS PNL BLDV: SIGNIFICANT CHANGE UP
GGT SERPL-CCNC: 61 U/L — HIGH (ref 8–40)
GIANT PLATELETS BLD QL SMEAR: PRESENT — SIGNIFICANT CHANGE UP
GIANT PLATELETS BLD QL SMEAR: PRESENT — SIGNIFICANT CHANGE UP
GLUCOSE BLDC GLUCOMTR-MCNC: 113 MG/DL — HIGH (ref 70–99)
GLUCOSE BLDC GLUCOMTR-MCNC: 172 MG/DL — HIGH (ref 70–99)
GLUCOSE BLDC GLUCOMTR-MCNC: 172 MG/DL — HIGH (ref 70–99)
GLUCOSE SERPL-MCNC: 122 MG/DL — HIGH (ref 70–99)
GLUCOSE SERPL-MCNC: 129 MG/DL — HIGH (ref 70–99)
GLUCOSE SERPL-MCNC: 136 MG/DL — HIGH (ref 70–99)
GLUCOSE SERPL-MCNC: 139 MG/DL — HIGH (ref 70–99)
GLUCOSE SERPL-MCNC: 143 MG/DL — HIGH (ref 70–99)
GLUCOSE SERPL-MCNC: 161 MG/DL — HIGH (ref 70–99)
GLUCOSE SERPL-MCNC: 163 MG/DL — HIGH (ref 70–99)
GLUCOSE SERPL-MCNC: 189 MG/DL — HIGH (ref 70–99)
GLUCOSE SERPL-MCNC: 218 MG/DL — HIGH (ref 70–99)
GLUCOSE SERPL-MCNC: 86 MG/DL — SIGNIFICANT CHANGE UP (ref 70–99)
GLUCOSE UR QL: NEGATIVE — SIGNIFICANT CHANGE UP
GRAM STN FLD: SIGNIFICANT CHANGE UP
HCO3 BLDA-SCNC: 21 MMOL/L — SIGNIFICANT CHANGE UP (ref 21–28)
HCO3 BLDA-SCNC: 22 MMOL/L — SIGNIFICANT CHANGE UP (ref 21–28)
HCO3 BLDA-SCNC: 23 MMOL/L — SIGNIFICANT CHANGE UP (ref 21–28)
HCO3 BLDA-SCNC: 24 MMOL/L — SIGNIFICANT CHANGE UP (ref 21–28)
HCO3 BLDA-SCNC: 27 MMOL/L — SIGNIFICANT CHANGE UP (ref 21–28)
HCO3 BLDV-SCNC: 31 MMOL/L — HIGH (ref 22–29)
HCO3 BLDV-SCNC: 34 MMOL/L — HIGH (ref 22–29)
HCT VFR BLD CALC: 30.7 % — LOW (ref 34.5–45)
HCT VFR BLD CALC: 31.2 % — LOW (ref 34.5–45)
HCT VFR BLD CALC: 31.5 % — LOW (ref 34.5–45)
HCT VFR BLD CALC: 31.7 % — LOW (ref 34.5–45)
HCT VFR BLD CALC: 32.5 % — LOW (ref 34.5–45)
HCT VFR BLD CALC: 32.8 % — LOW (ref 34.5–45)
HCT VFR BLD CALC: 33.5 % — LOW (ref 34.5–45)
HCT VFR BLD CALC: 36 % — SIGNIFICANT CHANGE UP (ref 34.5–45)
HCT VFR BLD CALC: 36.4 % — SIGNIFICANT CHANGE UP (ref 34.5–45)
HCT VFR BLD CALC: 38 % — SIGNIFICANT CHANGE UP (ref 34.5–45)
HCT VFR BLD CALC: 38.8 % — SIGNIFICANT CHANGE UP (ref 34.5–45)
HGB BLD-MCNC: 10.5 G/DL — LOW (ref 11.5–15.5)
HGB BLD-MCNC: 11.1 G/DL — LOW (ref 11.5–15.5)
HGB BLD-MCNC: 11.3 G/DL — LOW (ref 11.5–15.5)
HGB BLD-MCNC: 12.1 G/DL — SIGNIFICANT CHANGE UP (ref 11.5–15.5)
HGB BLD-MCNC: 9.2 G/DL — LOW (ref 11.5–15.5)
HGB BLD-MCNC: 9.2 G/DL — LOW (ref 11.5–15.5)
HGB BLD-MCNC: 9.4 G/DL — LOW (ref 11.5–15.5)
HGB BLD-MCNC: 9.6 G/DL — LOW (ref 11.5–15.5)
HGB BLD-MCNC: 9.7 G/DL — LOW (ref 11.5–15.5)
HGB BLD-MCNC: 9.8 G/DL — LOW (ref 11.5–15.5)
HGB BLD-MCNC: 9.8 G/DL — LOW (ref 11.5–15.5)
HYALINE CASTS # UR AUTO: SIGNIFICANT CHANGE UP /LPF (ref 0–2)
HYPOCHROMIA BLD QL: SLIGHT — SIGNIFICANT CHANGE UP
HYPOCHROMIA BLD QL: SLIGHT — SIGNIFICANT CHANGE UP
IMM GRANULOCYTES NFR BLD AUTO: 0.2 % — SIGNIFICANT CHANGE UP (ref 0–1.5)
IMM GRANULOCYTES NFR BLD AUTO: 0.5 % — SIGNIFICANT CHANGE UP (ref 0–1.5)
IMM GRANULOCYTES NFR BLD AUTO: 0.5 % — SIGNIFICANT CHANGE UP (ref 0–1.5)
IMM GRANULOCYTES NFR BLD AUTO: 0.6 % — SIGNIFICANT CHANGE UP (ref 0–1.5)
IMM GRANULOCYTES NFR BLD AUTO: 1.3 % — SIGNIFICANT CHANGE UP (ref 0–1.5)
IMM GRANULOCYTES NFR BLD AUTO: 1.8 % — HIGH (ref 0–1.5)
IMM GRANULOCYTES NFR BLD AUTO: 3 % — HIGH (ref 0–1.5)
IMM GRANULOCYTES NFR BLD AUTO: 7.2 % — HIGH (ref 0–1.5)
INR BLD: 0.96 — SIGNIFICANT CHANGE UP (ref 0.88–1.16)
INR BLD: 0.97 — SIGNIFICANT CHANGE UP (ref 0.88–1.16)
KETONES UR-MCNC: NEGATIVE — SIGNIFICANT CHANGE UP
LACTATE SERPL-SCNC: 1.6 MMOL/L — SIGNIFICANT CHANGE UP (ref 0.5–2)
LACTATE SERPL-SCNC: 2.1 MMOL/L — HIGH (ref 0.5–2)
LACTATE SERPL-SCNC: 2.5 MMOL/L — HIGH (ref 0.5–2)
LACTATE SERPL-SCNC: 3 MMOL/L — HIGH (ref 0.5–2)
LACTATE SERPL-SCNC: 3.1 MMOL/L — HIGH (ref 0.5–2)
LEUKOCYTE ESTERASE UR-ACNC: ABNORMAL
LEUKOCYTE ESTERASE UR-ACNC: ABNORMAL
LEUKOCYTE ESTERASE UR-ACNC: NEGATIVE — SIGNIFICANT CHANGE UP
LYMPHOCYTES # BLD AUTO: 0.54 K/UL — LOW (ref 1–3.3)
LYMPHOCYTES # BLD AUTO: 0.67 K/UL — LOW (ref 1–3.3)
LYMPHOCYTES # BLD AUTO: 0.74 K/UL — LOW (ref 1–3.3)
LYMPHOCYTES # BLD AUTO: 0.84 K/UL — LOW (ref 1–3.3)
LYMPHOCYTES # BLD AUTO: 0.91 K/UL — LOW (ref 1–3.3)
LYMPHOCYTES # BLD AUTO: 1.13 K/UL — SIGNIFICANT CHANGE UP (ref 1–3.3)
LYMPHOCYTES # BLD AUTO: 1.46 K/UL — SIGNIFICANT CHANGE UP (ref 1–3.3)
LYMPHOCYTES # BLD AUTO: 1.65 K/UL — SIGNIFICANT CHANGE UP (ref 1–3.3)
LYMPHOCYTES # BLD AUTO: 1.87 K/UL — SIGNIFICANT CHANGE UP (ref 1–3.3)
LYMPHOCYTES # BLD AUTO: 13.1 % — SIGNIFICANT CHANGE UP (ref 13–44)
LYMPHOCYTES # BLD AUTO: 16.4 % — SIGNIFICANT CHANGE UP (ref 13–44)
LYMPHOCYTES # BLD AUTO: 16.9 % — SIGNIFICANT CHANGE UP (ref 13–44)
LYMPHOCYTES # BLD AUTO: 17.2 % — SIGNIFICANT CHANGE UP (ref 13–44)
LYMPHOCYTES # BLD AUTO: 2.12 K/UL — SIGNIFICANT CHANGE UP (ref 1–3.3)
LYMPHOCYTES # BLD AUTO: 22.7 % — SIGNIFICANT CHANGE UP (ref 13–44)
LYMPHOCYTES # BLD AUTO: 4.1 % — LOW (ref 13–44)
LYMPHOCYTES # BLD AUTO: 5.5 % — LOW (ref 13–44)
LYMPHOCYTES # BLD AUTO: 6 % — LOW (ref 13–44)
LYMPHOCYTES # BLD AUTO: 6.1 % — LOW (ref 13–44)
LYMPHOCYTES # BLD AUTO: 6.7 % — LOW (ref 13–44)
MACROCYTES BLD QL: SLIGHT — SIGNIFICANT CHANGE UP
MACROCYTES BLD QL: SLIGHT — SIGNIFICANT CHANGE UP
MAGNESIUM SERPL-MCNC: 1.8 MG/DL — SIGNIFICANT CHANGE UP (ref 1.6–2.6)
MAGNESIUM SERPL-MCNC: 1.8 MG/DL — SIGNIFICANT CHANGE UP (ref 1.6–2.6)
MAGNESIUM SERPL-MCNC: 1.9 MG/DL — SIGNIFICANT CHANGE UP (ref 1.6–2.6)
MAGNESIUM SERPL-MCNC: 1.9 MG/DL — SIGNIFICANT CHANGE UP (ref 1.6–2.6)
MAGNESIUM SERPL-MCNC: 2.1 MG/DL — SIGNIFICANT CHANGE UP (ref 1.6–2.6)
MAGNESIUM SERPL-MCNC: 2.1 MG/DL — SIGNIFICANT CHANGE UP (ref 1.6–2.6)
MAGNESIUM SERPL-MCNC: 2.2 MG/DL — SIGNIFICANT CHANGE UP (ref 1.6–2.6)
MANUAL SMEAR VERIFICATION: SIGNIFICANT CHANGE UP
MANUAL SMEAR VERIFICATION: SIGNIFICANT CHANGE UP
MCHC RBC-ENTMCNC: 28.8 GM/DL — LOW (ref 32–36)
MCHC RBC-ENTMCNC: 29 PG — SIGNIFICANT CHANGE UP (ref 27–34)
MCHC RBC-ENTMCNC: 29 PG — SIGNIFICANT CHANGE UP (ref 27–34)
MCHC RBC-ENTMCNC: 29.2 GM/DL — LOW (ref 32–36)
MCHC RBC-ENTMCNC: 29.3 GM/DL — LOW (ref 32–36)
MCHC RBC-ENTMCNC: 29.4 PG — SIGNIFICANT CHANGE UP (ref 27–34)
MCHC RBC-ENTMCNC: 29.6 GM/DL — LOW (ref 32–36)
MCHC RBC-ENTMCNC: 29.6 PG — SIGNIFICANT CHANGE UP (ref 27–34)
MCHC RBC-ENTMCNC: 29.7 GM/DL — LOW (ref 32–36)
MCHC RBC-ENTMCNC: 29.7 GM/DL — LOW (ref 32–36)
MCHC RBC-ENTMCNC: 30 GM/DL — LOW (ref 32–36)
MCHC RBC-ENTMCNC: 30 PG — SIGNIFICANT CHANGE UP (ref 27–34)
MCHC RBC-ENTMCNC: 30 PG — SIGNIFICANT CHANGE UP (ref 27–34)
MCHC RBC-ENTMCNC: 30.1 PG — SIGNIFICANT CHANGE UP (ref 27–34)
MCHC RBC-ENTMCNC: 30.2 GM/DL — LOW (ref 32–36)
MCHC RBC-ENTMCNC: 30.2 PG — SIGNIFICANT CHANGE UP (ref 27–34)
MCHC RBC-ENTMCNC: 30.3 PG — SIGNIFICANT CHANGE UP (ref 27–34)
MCHC RBC-ENTMCNC: 30.3 PG — SIGNIFICANT CHANGE UP (ref 27–34)
MCHC RBC-ENTMCNC: 30.8 GM/DL — LOW (ref 32–36)
MCHC RBC-ENTMCNC: 30.8 GM/DL — LOW (ref 32–36)
MCHC RBC-ENTMCNC: 30.9 PG — SIGNIFICANT CHANGE UP (ref 27–34)
MCHC RBC-ENTMCNC: 31.2 GM/DL — LOW (ref 32–36)
MCV RBC AUTO: 100 FL — SIGNIFICANT CHANGE UP (ref 80–100)
MCV RBC AUTO: 100.3 FL — HIGH (ref 80–100)
MCV RBC AUTO: 100.6 FL — HIGH (ref 80–100)
MCV RBC AUTO: 101.6 FL — HIGH (ref 80–100)
MCV RBC AUTO: 102.5 FL — HIGH (ref 80–100)
MCV RBC AUTO: 104 FL — HIGH (ref 80–100)
MCV RBC AUTO: 96.3 FL — SIGNIFICANT CHANGE UP (ref 80–100)
MCV RBC AUTO: 97.2 FL — SIGNIFICANT CHANGE UP (ref 80–100)
MCV RBC AUTO: 97.8 FL — SIGNIFICANT CHANGE UP (ref 80–100)
MCV RBC AUTO: 99.1 FL — SIGNIFICANT CHANGE UP (ref 80–100)
MCV RBC AUTO: 99.7 FL — SIGNIFICANT CHANGE UP (ref 80–100)
METAMYELOCYTES # FLD: 0.9 % — HIGH (ref 0–0)
METHOD TYPE: SIGNIFICANT CHANGE UP
MONOCYTES # BLD AUTO: 0.1 K/UL — SIGNIFICANT CHANGE UP (ref 0–0.9)
MONOCYTES # BLD AUTO: 0.23 K/UL — SIGNIFICANT CHANGE UP (ref 0–0.9)
MONOCYTES # BLD AUTO: 0.31 K/UL — SIGNIFICANT CHANGE UP (ref 0–0.9)
MONOCYTES # BLD AUTO: 0.37 K/UL — SIGNIFICANT CHANGE UP (ref 0–0.9)
MONOCYTES # BLD AUTO: 0.75 K/UL — SIGNIFICANT CHANGE UP (ref 0–0.9)
MONOCYTES # BLD AUTO: 0.8 K/UL — SIGNIFICANT CHANGE UP (ref 0–0.9)
MONOCYTES # BLD AUTO: 1.14 K/UL — HIGH (ref 0–0.9)
MONOCYTES # BLD AUTO: 1.16 K/UL — HIGH (ref 0–0.9)
MONOCYTES # BLD AUTO: 1.16 K/UL — HIGH (ref 0–0.9)
MONOCYTES # BLD AUTO: 1.22 K/UL — HIGH (ref 0–0.9)
MONOCYTES NFR BLD AUTO: 0.9 % — LOW (ref 2–14)
MONOCYTES NFR BLD AUTO: 11.3 % — SIGNIFICANT CHANGE UP (ref 2–14)
MONOCYTES NFR BLD AUTO: 13.4 % — SIGNIFICANT CHANGE UP (ref 2–14)
MONOCYTES NFR BLD AUTO: 14.1 % — HIGH (ref 2–14)
MONOCYTES NFR BLD AUTO: 2.3 % — SIGNIFICANT CHANGE UP (ref 2–14)
MONOCYTES NFR BLD AUTO: 2.6 % — SIGNIFICANT CHANGE UP (ref 2–14)
MONOCYTES NFR BLD AUTO: 3 % — SIGNIFICANT CHANGE UP (ref 2–14)
MONOCYTES NFR BLD AUTO: 5.3 % — SIGNIFICANT CHANGE UP (ref 2–14)
MONOCYTES NFR BLD AUTO: 7.4 % — SIGNIFICANT CHANGE UP (ref 2–14)
MONOCYTES NFR BLD AUTO: 8 % — SIGNIFICANT CHANGE UP (ref 2–14)
MYELOCYTES NFR BLD: 1.8 % — HIGH (ref 0–0)
NEUTROPHILS # BLD AUTO: 10.32 K/UL — HIGH (ref 1.8–7.4)
NEUTROPHILS # BLD AUTO: 12.04 K/UL — HIGH (ref 1.8–7.4)
NEUTROPHILS # BLD AUTO: 19.43 K/UL — HIGH (ref 1.8–7.4)
NEUTROPHILS # BLD AUTO: 5.95 K/UL — SIGNIFICANT CHANGE UP (ref 1.8–7.4)
NEUTROPHILS # BLD AUTO: 6.24 K/UL — SIGNIFICANT CHANGE UP (ref 1.8–7.4)
NEUTROPHILS # BLD AUTO: 6.24 K/UL — SIGNIFICANT CHANGE UP (ref 1.8–7.4)
NEUTROPHILS # BLD AUTO: 7.18 K/UL — SIGNIFICANT CHANGE UP (ref 1.8–7.4)
NEUTROPHILS # BLD AUTO: 8.04 K/UL — HIGH (ref 1.8–7.4)
NEUTROPHILS # BLD AUTO: 8.16 K/UL — HIGH (ref 1.8–7.4)
NEUTROPHILS # BLD AUTO: 9.93 K/UL — HIGH (ref 1.8–7.4)
NEUTROPHILS NFR BLD AUTO: 67.1 % — SIGNIFICANT CHANGE UP (ref 43–77)
NEUTROPHILS NFR BLD AUTO: 69 % — SIGNIFICANT CHANGE UP (ref 43–77)
NEUTROPHILS NFR BLD AUTO: 71.2 % — SIGNIFICANT CHANGE UP (ref 43–77)
NEUTROPHILS NFR BLD AUTO: 72.2 % — SIGNIFICANT CHANGE UP (ref 43–77)
NEUTROPHILS NFR BLD AUTO: 74 % — SIGNIFICANT CHANGE UP (ref 43–77)
NEUTROPHILS NFR BLD AUTO: 82.7 % — HIGH (ref 43–77)
NEUTROPHILS NFR BLD AUTO: 88.6 % — HIGH (ref 43–77)
NEUTROPHILS NFR BLD AUTO: 89.1 % — HIGH (ref 43–77)
NEUTROPHILS NFR BLD AUTO: 90.3 % — HIGH (ref 43–77)
NEUTROPHILS NFR BLD AUTO: 91.4 % — HIGH (ref 43–77)
NITRITE UR-MCNC: NEGATIVE — SIGNIFICANT CHANGE UP
NITRITE UR-MCNC: POSITIVE
NITRITE UR-MCNC: POSITIVE
NRBC # BLD: 0 /100 WBCS — SIGNIFICANT CHANGE UP (ref 0–0)
NRBC # BLD: 4 /100 — HIGH (ref 0–0)
NRBC # BLD: SIGNIFICANT CHANGE UP /100 WBCS (ref 0–0)
ORGANISM # SPEC MICROSCOPIC CNT: SIGNIFICANT CHANGE UP
OSMOLALITY UR: 284 MOSM/KG — LOW (ref 300–900)
OVALOCYTES BLD QL SMEAR: SLIGHT — SIGNIFICANT CHANGE UP
OVALOCYTES BLD QL SMEAR: SLIGHT — SIGNIFICANT CHANGE UP
PCO2 BLDA: 38 MMHG — HIGH (ref 32–35)
PCO2 BLDA: 39 MMHG — HIGH (ref 32–35)
PCO2 BLDA: 39 MMHG — HIGH (ref 32–35)
PCO2 BLDA: 44 MMHG — HIGH (ref 32–35)
PCO2 BLDA: 48 MMHG — HIGH (ref 32–35)
PCO2 BLDA: 49 MMHG — HIGH (ref 32–35)
PCO2 BLDA: 55 MMHG — HIGH (ref 32–35)
PCO2 BLDV: 57 MMHG — HIGH (ref 39–42)
PCO2 BLDV: 61 MMHG — HIGH (ref 39–42)
PH BLDA: 7.21 — LOW (ref 7.35–7.45)
PH BLDA: 7.24 — LOW (ref 7.35–7.45)
PH BLDA: 7.34 — LOW (ref 7.35–7.45)
PH BLDA: 7.36 — SIGNIFICANT CHANGE UP (ref 7.35–7.45)
PH BLDA: 7.36 — SIGNIFICANT CHANGE UP (ref 7.35–7.45)
PH BLDA: 7.37 — SIGNIFICANT CHANGE UP (ref 7.35–7.45)
PH BLDA: 7.39 — SIGNIFICANT CHANGE UP (ref 7.35–7.45)
PH BLDV: 7.34 — SIGNIFICANT CHANGE UP (ref 7.32–7.43)
PH BLDV: 7.35 — SIGNIFICANT CHANGE UP (ref 7.32–7.43)
PH UR: 5.5 — SIGNIFICANT CHANGE UP (ref 5–8)
PH UR: 6 — SIGNIFICANT CHANGE UP (ref 5–8)
PH UR: 7 — SIGNIFICANT CHANGE UP (ref 5–8)
PHOSPHATE SERPL-MCNC: 3.7 MG/DL — SIGNIFICANT CHANGE UP (ref 2.5–4.5)
PHOSPHATE SERPL-MCNC: 4 MG/DL — SIGNIFICANT CHANGE UP (ref 2.5–4.5)
PHOSPHATE SERPL-MCNC: 4.7 MG/DL — HIGH (ref 2.5–4.5)
PHOSPHATE SERPL-MCNC: 4.8 MG/DL — HIGH (ref 2.5–4.5)
PHOSPHATE SERPL-MCNC: 5.2 MG/DL — HIGH (ref 2.5–4.5)
PHOSPHATE SERPL-MCNC: 5.3 MG/DL — HIGH (ref 2.5–4.5)
PHOSPHATE SERPL-MCNC: 5.4 MG/DL — HIGH (ref 2.5–4.5)
PLAT MORPH BLD: ABNORMAL
PLAT MORPH BLD: ABNORMAL
PLATELET # BLD AUTO: 187 K/UL — SIGNIFICANT CHANGE UP (ref 150–400)
PLATELET # BLD AUTO: 188 K/UL — SIGNIFICANT CHANGE UP (ref 150–400)
PLATELET # BLD AUTO: 190 K/UL — SIGNIFICANT CHANGE UP (ref 150–400)
PLATELET # BLD AUTO: 198 K/UL — SIGNIFICANT CHANGE UP (ref 150–400)
PLATELET # BLD AUTO: 201 K/UL — SIGNIFICANT CHANGE UP (ref 150–400)
PLATELET # BLD AUTO: 203 K/UL — SIGNIFICANT CHANGE UP (ref 150–400)
PLATELET # BLD AUTO: 204 K/UL — SIGNIFICANT CHANGE UP (ref 150–400)
PLATELET # BLD AUTO: 242 K/UL — SIGNIFICANT CHANGE UP (ref 150–400)
PLATELET # BLD AUTO: 298 K/UL — SIGNIFICANT CHANGE UP (ref 150–400)
PLATELET # BLD AUTO: 303 K/UL — SIGNIFICANT CHANGE UP (ref 150–400)
PLATELET # BLD AUTO: 305 K/UL — SIGNIFICANT CHANGE UP (ref 150–400)
PO2 BLDA: 103 MMHG — SIGNIFICANT CHANGE UP (ref 83–108)
PO2 BLDA: 103 MMHG — SIGNIFICANT CHANGE UP (ref 83–108)
PO2 BLDA: 131 MMHG — HIGH (ref 83–108)
PO2 BLDA: 145 MMHG — HIGH (ref 83–108)
PO2 BLDA: 153 MMHG — HIGH (ref 83–108)
PO2 BLDA: 300 MMHG — HIGH (ref 83–108)
PO2 BLDA: 47 MMHG — CRITICAL LOW (ref 83–108)
PO2 BLDV: 39 MMHG — SIGNIFICANT CHANGE UP (ref 25–45)
PO2 BLDV: <29 MMHG — LOW (ref 25–45)
POIKILOCYTOSIS BLD QL AUTO: SIGNIFICANT CHANGE UP
POIKILOCYTOSIS BLD QL AUTO: SLIGHT — SIGNIFICANT CHANGE UP
POLYCHROMASIA BLD QL SMEAR: SLIGHT — SIGNIFICANT CHANGE UP
POLYCHROMASIA BLD QL SMEAR: SLIGHT — SIGNIFICANT CHANGE UP
POTASSIUM BLDV-SCNC: 5 MMOL/L — SIGNIFICANT CHANGE UP (ref 3.5–5.1)
POTASSIUM BLDV-SCNC: 5.2 MMOL/L — HIGH (ref 3.5–5.1)
POTASSIUM SERPL-MCNC: 4 MMOL/L — SIGNIFICANT CHANGE UP (ref 3.5–5.3)
POTASSIUM SERPL-MCNC: 4.1 MMOL/L — SIGNIFICANT CHANGE UP (ref 3.5–5.3)
POTASSIUM SERPL-MCNC: 4.2 MMOL/L — SIGNIFICANT CHANGE UP (ref 3.5–5.3)
POTASSIUM SERPL-MCNC: 4.4 MMOL/L — SIGNIFICANT CHANGE UP (ref 3.5–5.3)
POTASSIUM SERPL-MCNC: 4.6 MMOL/L — SIGNIFICANT CHANGE UP (ref 3.5–5.3)
POTASSIUM SERPL-MCNC: 4.8 MMOL/L — SIGNIFICANT CHANGE UP (ref 3.5–5.3)
POTASSIUM SERPL-MCNC: 5.1 MMOL/L — SIGNIFICANT CHANGE UP (ref 3.5–5.3)
POTASSIUM SERPL-MCNC: 5.7 MMOL/L — HIGH (ref 3.5–5.3)
POTASSIUM SERPL-SCNC: 4 MMOL/L — SIGNIFICANT CHANGE UP (ref 3.5–5.3)
POTASSIUM SERPL-SCNC: 4.1 MMOL/L — SIGNIFICANT CHANGE UP (ref 3.5–5.3)
POTASSIUM SERPL-SCNC: 4.2 MMOL/L — SIGNIFICANT CHANGE UP (ref 3.5–5.3)
POTASSIUM SERPL-SCNC: 4.4 MMOL/L — SIGNIFICANT CHANGE UP (ref 3.5–5.3)
POTASSIUM SERPL-SCNC: 4.6 MMOL/L — SIGNIFICANT CHANGE UP (ref 3.5–5.3)
POTASSIUM SERPL-SCNC: 4.8 MMOL/L — SIGNIFICANT CHANGE UP (ref 3.5–5.3)
POTASSIUM SERPL-SCNC: 5.1 MMOL/L — SIGNIFICANT CHANGE UP (ref 3.5–5.3)
POTASSIUM SERPL-SCNC: 5.7 MMOL/L — HIGH (ref 3.5–5.3)
PROCALCITONIN SERPL-MCNC: 0.42 NG/ML — HIGH (ref 0.02–0.1)
PROT SERPL-MCNC: 5.4 G/DL — LOW (ref 6–8.3)
PROT SERPL-MCNC: 5.7 G/DL — LOW (ref 6–8.3)
PROT SERPL-MCNC: 6.2 G/DL — SIGNIFICANT CHANGE UP (ref 6–8.3)
PROT SERPL-MCNC: 6.5 G/DL — SIGNIFICANT CHANGE UP (ref 6–8.3)
PROT SERPL-MCNC: 6.6 G/DL — SIGNIFICANT CHANGE UP (ref 6–8.3)
PROT SERPL-MCNC: 6.7 G/DL — SIGNIFICANT CHANGE UP (ref 6–8.3)
PROT SERPL-MCNC: 7.2 G/DL — SIGNIFICANT CHANGE UP (ref 6–8.3)
PROT UR-MCNC: 100 MG/DL
PROT UR-MCNC: 30 MG/DL
PROT UR-MCNC: 30 MG/DL
PROTHROM AB SERPL-ACNC: 11.4 SEC — SIGNIFICANT CHANGE UP (ref 10.5–13.4)
PROTHROM AB SERPL-ACNC: 11.5 SEC — SIGNIFICANT CHANGE UP (ref 10.5–13.4)
RBC # BLD: 3.07 M/UL — LOW (ref 3.8–5.2)
RBC # BLD: 3.13 M/UL — LOW (ref 3.8–5.2)
RBC # BLD: 3.2 M/UL — LOW (ref 3.8–5.2)
RBC # BLD: 3.24 M/UL — LOW (ref 3.8–5.2)
RBC # BLD: 3.24 M/UL — LOW (ref 3.8–5.2)
RBC # BLD: 3.26 M/UL — LOW (ref 3.8–5.2)
RBC # BLD: 3.38 M/UL — LOW (ref 3.8–5.2)
RBC # BLD: 3.5 M/UL — LOW (ref 3.8–5.2)
RBC # BLD: 3.59 M/UL — LOW (ref 3.8–5.2)
RBC # BLD: 3.74 M/UL — LOW (ref 3.8–5.2)
RBC # BLD: 3.99 M/UL — SIGNIFICANT CHANGE UP (ref 3.8–5.2)
RBC # FLD: 14.2 % — SIGNIFICANT CHANGE UP (ref 10.3–14.5)
RBC # FLD: 14.4 % — SIGNIFICANT CHANGE UP (ref 10.3–14.5)
RBC # FLD: 14.5 % — SIGNIFICANT CHANGE UP (ref 10.3–14.5)
RBC # FLD: 14.6 % — HIGH (ref 10.3–14.5)
RBC # FLD: 14.6 % — HIGH (ref 10.3–14.5)
RBC # FLD: 14.7 % — HIGH (ref 10.3–14.5)
RBC # FLD: 15 % — HIGH (ref 10.3–14.5)
RBC # FLD: 15.1 % — HIGH (ref 10.3–14.5)
RBC # FLD: 15.2 % — HIGH (ref 10.3–14.5)
RBC # FLD: 15.2 % — HIGH (ref 10.3–14.5)
RBC # FLD: 15.4 % — HIGH (ref 10.3–14.5)
RBC BLD AUTO: ABNORMAL
RBC BLD AUTO: ABNORMAL
RBC CASTS # UR COMP ASSIST: < 5 /HPF — SIGNIFICANT CHANGE UP
RBC CASTS # UR COMP ASSIST: < 5 /HPF — SIGNIFICANT CHANGE UP
RBC CASTS # UR COMP ASSIST: ABNORMAL /HPF
RH IG SCN BLD-IMP: POSITIVE — SIGNIFICANT CHANGE UP
RSV RNA NPH QL NAA+NON-PROBE: SIGNIFICANT CHANGE UP
SAO2 % BLDA: 95 % — SIGNIFICANT CHANGE UP (ref 94–98)
SAO2 % BLDA: 96.2 % — SIGNIFICANT CHANGE UP (ref 94–98)
SAO2 % BLDA: 96.7 % — SIGNIFICANT CHANGE UP (ref 94–98)
SAO2 % BLDA: 96.9 % — SIGNIFICANT CHANGE UP (ref 94–98)
SAO2 % BLDA: 97 % — SIGNIFICANT CHANGE UP (ref 94–98)
SAO2 % BLDA: 97.2 % — SIGNIFICANT CHANGE UP (ref 94–98)
SAO2 % BLDA: SIGNIFICANT CHANGE UP % (ref 94–98)
SAO2 % BLDV: 22.6 % — LOW (ref 67–88)
SAO2 % BLDV: 55.3 % — LOW (ref 67–88)
SARS-COV-2 RNA SPEC QL NAA+PROBE: SIGNIFICANT CHANGE UP
SARS-COV-2 RNA SPEC QL NAA+PROBE: SIGNIFICANT CHANGE UP
SCHISTOCYTES BLD QL AUTO: SLIGHT — SIGNIFICANT CHANGE UP
SCHISTOCYTES BLD QL AUTO: SLIGHT — SIGNIFICANT CHANGE UP
SMUDGE CELLS # BLD: PRESENT — SIGNIFICANT CHANGE UP
SODIUM SERPL-SCNC: 133 MMOL/L — LOW (ref 135–145)
SODIUM SERPL-SCNC: 134 MMOL/L — LOW (ref 135–145)
SODIUM SERPL-SCNC: 135 MMOL/L — SIGNIFICANT CHANGE UP (ref 135–145)
SODIUM SERPL-SCNC: 138 MMOL/L — SIGNIFICANT CHANGE UP (ref 135–145)
SODIUM SERPL-SCNC: 139 MMOL/L — SIGNIFICANT CHANGE UP (ref 135–145)
SODIUM SERPL-SCNC: 139 MMOL/L — SIGNIFICANT CHANGE UP (ref 135–145)
SODIUM SERPL-SCNC: 140 MMOL/L — SIGNIFICANT CHANGE UP (ref 135–145)
SODIUM SERPL-SCNC: 141 MMOL/L — SIGNIFICANT CHANGE UP (ref 135–145)
SODIUM SERPL-SCNC: 142 MMOL/L — SIGNIFICANT CHANGE UP (ref 135–145)
SODIUM SERPL-SCNC: 147 MMOL/L — HIGH (ref 135–145)
SODIUM UR-SCNC: 39 MMOL/L — SIGNIFICANT CHANGE UP
SP GR SPEC: 1.01 — SIGNIFICANT CHANGE UP (ref 1–1.03)
SP GR SPEC: 1.01 — SIGNIFICANT CHANGE UP (ref 1–1.03)
SP GR SPEC: 1.02 — SIGNIFICANT CHANGE UP (ref 1–1.03)
SPECIMEN SOURCE: SIGNIFICANT CHANGE UP
SPHEROCYTES BLD QL SMEAR: SLIGHT — SIGNIFICANT CHANGE UP
SPHEROCYTES BLD QL SMEAR: SLIGHT — SIGNIFICANT CHANGE UP
TSH SERPL-MCNC: 3.76 UIU/ML — SIGNIFICANT CHANGE UP (ref 0.27–4.2)
UROBILINOGEN FLD QL: 0.2 E.U./DL — SIGNIFICANT CHANGE UP
UROBILINOGEN FLD QL: 1 E.U./DL — SIGNIFICANT CHANGE UP
UROBILINOGEN FLD QL: 1 E.U./DL — SIGNIFICANT CHANGE UP
UUN UR-MCNC: 338 MG/DL — SIGNIFICANT CHANGE UP
VANCOMYCIN FLD-MCNC: 22.5 UG/ML — SIGNIFICANT CHANGE UP
VANCOMYCIN TROUGH SERPL-MCNC: 15.4 UG/ML — SIGNIFICANT CHANGE UP (ref 10–20)
VIT B12 SERPL-MCNC: >2000 PG/ML — HIGH (ref 232–1245)
WBC # BLD: 10.08 K/UL — SIGNIFICANT CHANGE UP (ref 3.8–10.5)
WBC # BLD: 10.85 K/UL — HIGH (ref 3.8–10.5)
WBC # BLD: 11 K/UL — HIGH (ref 3.8–10.5)
WBC # BLD: 12.48 K/UL — HIGH (ref 3.8–10.5)
WBC # BLD: 13.51 K/UL — HIGH (ref 3.8–10.5)
WBC # BLD: 21.94 K/UL — HIGH (ref 3.8–10.5)
WBC # BLD: 8.38 K/UL — SIGNIFICANT CHANGE UP (ref 3.8–10.5)
WBC # BLD: 8.63 K/UL — SIGNIFICANT CHANGE UP (ref 3.8–10.5)
WBC # BLD: 8.65 K/UL — SIGNIFICANT CHANGE UP (ref 3.8–10.5)
WBC # BLD: 8.93 K/UL — SIGNIFICANT CHANGE UP (ref 3.8–10.5)
WBC # BLD: 9.32 K/UL — SIGNIFICANT CHANGE UP (ref 3.8–10.5)
WBC # FLD AUTO: 10.08 K/UL — SIGNIFICANT CHANGE UP (ref 3.8–10.5)
WBC # FLD AUTO: 10.85 K/UL — HIGH (ref 3.8–10.5)
WBC # FLD AUTO: 11 K/UL — HIGH (ref 3.8–10.5)
WBC # FLD AUTO: 12.48 K/UL — HIGH (ref 3.8–10.5)
WBC # FLD AUTO: 13.51 K/UL — HIGH (ref 3.8–10.5)
WBC # FLD AUTO: 21.94 K/UL — HIGH (ref 3.8–10.5)
WBC # FLD AUTO: 8.38 K/UL — SIGNIFICANT CHANGE UP (ref 3.8–10.5)
WBC # FLD AUTO: 8.63 K/UL — SIGNIFICANT CHANGE UP (ref 3.8–10.5)
WBC # FLD AUTO: 8.65 K/UL — SIGNIFICANT CHANGE UP (ref 3.8–10.5)
WBC # FLD AUTO: 8.93 K/UL — SIGNIFICANT CHANGE UP (ref 3.8–10.5)
WBC # FLD AUTO: 9.32 K/UL — SIGNIFICANT CHANGE UP (ref 3.8–10.5)
WBC UR QL: ABNORMAL /HPF

## 2022-01-01 PROCEDURE — 87637 SARSCOV2&INF A&B&RSV AMP PRB: CPT

## 2022-01-01 PROCEDURE — 71045 X-RAY EXAM CHEST 1 VIEW: CPT | Mod: 26

## 2022-01-01 PROCEDURE — 99233 SBSQ HOSP IP/OBS HIGH 50: CPT | Mod: GC

## 2022-01-01 PROCEDURE — 86901 BLOOD TYPING SEROLOGIC RH(D): CPT

## 2022-01-01 PROCEDURE — 84300 ASSAY OF URINE SODIUM: CPT

## 2022-01-01 PROCEDURE — 99291 CRITICAL CARE FIRST HOUR: CPT

## 2022-01-01 PROCEDURE — 43753 TX GASTRO INTUB W/ASP: CPT

## 2022-01-01 PROCEDURE — 86850 RBC ANTIBODY SCREEN: CPT

## 2022-01-01 PROCEDURE — 71046 X-RAY EXAM CHEST 2 VIEWS: CPT

## 2022-01-01 PROCEDURE — 76937 US GUIDE VASCULAR ACCESS: CPT | Mod: 26,59,77

## 2022-01-01 PROCEDURE — 94640 AIRWAY INHALATION TREATMENT: CPT

## 2022-01-01 PROCEDURE — 99358 PROLONG SERVICE W/O CONTACT: CPT | Mod: NC

## 2022-01-01 PROCEDURE — 99233 SBSQ HOSP IP/OBS HIGH 50: CPT | Mod: GC,25

## 2022-01-01 PROCEDURE — 97162 PT EVAL MOD COMPLEX 30 MIN: CPT

## 2022-01-01 PROCEDURE — 82570 ASSAY OF URINE CREATININE: CPT

## 2022-01-01 PROCEDURE — 82962 GLUCOSE BLOOD TEST: CPT

## 2022-01-01 PROCEDURE — 99232 SBSQ HOSP IP/OBS MODERATE 35: CPT | Mod: GC

## 2022-01-01 PROCEDURE — 99292 CRITICAL CARE ADDL 30 MIN: CPT

## 2022-01-01 PROCEDURE — 71045 X-RAY EXAM CHEST 1 VIEW: CPT | Mod: 26,76

## 2022-01-01 PROCEDURE — 84295 ASSAY OF SERUM SODIUM: CPT

## 2022-01-01 PROCEDURE — 99213 OFFICE O/P EST LOW 20 MIN: CPT

## 2022-01-01 PROCEDURE — 87324 CLOSTRIDIUM AG IA: CPT

## 2022-01-01 PROCEDURE — C8929: CPT

## 2022-01-01 PROCEDURE — 87449 NOS EACH ORGANISM AG IA: CPT

## 2022-01-01 PROCEDURE — 76937 US GUIDE VASCULAR ACCESS: CPT | Mod: 26,59

## 2022-01-01 PROCEDURE — 82607 VITAMIN B-12: CPT

## 2022-01-01 PROCEDURE — 99497 ADVNCD CARE PLAN 30 MIN: CPT | Mod: 25

## 2022-01-01 PROCEDURE — 85730 THROMBOPLASTIN TIME PARTIAL: CPT

## 2022-01-01 PROCEDURE — 99284 EMERGENCY DEPT VISIT MOD MDM: CPT

## 2022-01-01 PROCEDURE — 82977 ASSAY OF GGT: CPT

## 2022-01-01 PROCEDURE — 80048 BASIC METABOLIC PNL TOTAL CA: CPT

## 2022-01-01 PROCEDURE — 85610 PROTHROMBIN TIME: CPT

## 2022-01-01 PROCEDURE — 80053 COMPREHEN METABOLIC PANEL: CPT

## 2022-01-01 PROCEDURE — 96375 TX/PRO/DX INJ NEW DRUG ADDON: CPT

## 2022-01-01 PROCEDURE — 82330 ASSAY OF CALCIUM: CPT

## 2022-01-01 PROCEDURE — 97161 PT EVAL LOW COMPLEX 20 MIN: CPT

## 2022-01-01 PROCEDURE — 82803 BLOOD GASES ANY COMBINATION: CPT

## 2022-01-01 PROCEDURE — 71046 X-RAY EXAM CHEST 2 VIEWS: CPT | Mod: 26

## 2022-01-01 PROCEDURE — 84145 PROCALCITONIN (PCT): CPT

## 2022-01-01 PROCEDURE — 81001 URINALYSIS AUTO W/SCOPE: CPT

## 2022-01-01 PROCEDURE — 99233 SBSQ HOSP IP/OBS HIGH 50: CPT

## 2022-01-01 PROCEDURE — 84100 ASSAY OF PHOSPHORUS: CPT

## 2022-01-01 PROCEDURE — 71045 X-RAY EXAM CHEST 1 VIEW: CPT

## 2022-01-01 PROCEDURE — 99285 EMERGENCY DEPT VISIT HI MDM: CPT | Mod: 25

## 2022-01-01 PROCEDURE — 84132 ASSAY OF SERUM POTASSIUM: CPT

## 2022-01-01 PROCEDURE — 85025 COMPLETE CBC W/AUTO DIFF WBC: CPT

## 2022-01-01 PROCEDURE — 87186 SC STD MICRODIL/AGAR DIL: CPT

## 2022-01-01 PROCEDURE — 96374 THER/PROPH/DIAG INJ IV PUSH: CPT

## 2022-01-01 PROCEDURE — 93306 TTE W/DOPPLER COMPLETE: CPT | Mod: 26

## 2022-01-01 PROCEDURE — 93010 ELECTROCARDIOGRAM REPORT: CPT

## 2022-01-01 PROCEDURE — 36415 COLL VENOUS BLD VENIPUNCTURE: CPT

## 2022-01-01 PROCEDURE — 83935 ASSAY OF URINE OSMOLALITY: CPT

## 2022-01-01 PROCEDURE — 87507 IADNA-DNA/RNA PROBE TQ 12-25: CPT

## 2022-01-01 PROCEDURE — 87150 DNA/RNA AMPLIFIED PROBE: CPT

## 2022-01-01 PROCEDURE — 74019 RADEX ABDOMEN 2 VIEWS: CPT | Mod: 26

## 2022-01-01 PROCEDURE — 36000 PLACE NEEDLE IN VEIN: CPT | Mod: 77

## 2022-01-01 PROCEDURE — 99223 1ST HOSP IP/OBS HIGH 75: CPT

## 2022-01-01 PROCEDURE — 83735 ASSAY OF MAGNESIUM: CPT

## 2022-01-01 PROCEDURE — 84443 ASSAY THYROID STIM HORMONE: CPT

## 2022-01-01 PROCEDURE — 87086 URINE CULTURE/COLONY COUNT: CPT

## 2022-01-01 PROCEDURE — 99221 1ST HOSP IP/OBS SF/LOW 40: CPT

## 2022-01-01 PROCEDURE — 99498 ADVNCD CARE PLAN ADDL 30 MIN: CPT | Mod: 25

## 2022-01-01 PROCEDURE — 86900 BLOOD TYPING SEROLOGIC ABO: CPT

## 2022-01-01 PROCEDURE — 0225U NFCT DS DNA&RNA 21 SARSCOV2: CPT

## 2022-01-01 PROCEDURE — 74019 RADEX ABDOMEN 2 VIEWS: CPT

## 2022-01-01 PROCEDURE — 84540 ASSAY OF URINE/UREA-N: CPT

## 2022-01-01 PROCEDURE — 87040 BLOOD CULTURE FOR BACTERIA: CPT

## 2022-01-01 PROCEDURE — 80202 ASSAY OF VANCOMYCIN: CPT

## 2022-01-01 PROCEDURE — 85027 COMPLETE CBC AUTOMATED: CPT

## 2022-01-01 PROCEDURE — 93005 ELECTROCARDIOGRAM TRACING: CPT

## 2022-01-01 PROCEDURE — 97164 PT RE-EVAL EST PLAN CARE: CPT

## 2022-01-01 PROCEDURE — 83605 ASSAY OF LACTIC ACID: CPT

## 2022-01-01 PROCEDURE — U0003: CPT

## 2022-01-01 PROCEDURE — 94660 CPAP INITIATION&MGMT: CPT

## 2022-01-01 PROCEDURE — U0005: CPT

## 2022-01-01 PROCEDURE — 94002 VENT MGMT INPAT INIT DAY: CPT

## 2022-01-01 PROCEDURE — 82746 ASSAY OF FOLIC ACID SERUM: CPT

## 2022-01-01 PROCEDURE — 99497 ADVNCD CARE PLAN 30 MIN: CPT | Mod: GC

## 2022-01-01 RX ORDER — ACETAMINOPHEN 500 MG
650 TABLET ORAL ONCE
Refills: 0 | Status: COMPLETED | OUTPATIENT
Start: 2022-01-01 | End: 2022-01-01

## 2022-01-01 RX ORDER — IPRATROPIUM/ALBUTEROL SULFATE 18-103MCG
3 AEROSOL WITH ADAPTER (GRAM) INHALATION EVERY 4 HOURS
Refills: 0 | Status: DISCONTINUED | OUTPATIENT
Start: 2022-01-01 | End: 2022-01-01

## 2022-01-01 RX ORDER — VALSARTAN 80 MG/1
80 TABLET ORAL EVERY 24 HOURS
Refills: 0 | Status: DISCONTINUED | OUTPATIENT
Start: 2022-01-01 | End: 2022-01-01

## 2022-01-01 RX ORDER — FUROSEMIDE 40 MG
40 TABLET ORAL ONCE
Refills: 0 | Status: COMPLETED | OUTPATIENT
Start: 2022-01-01 | End: 2022-01-01

## 2022-01-01 RX ORDER — METOPROLOL TARTRATE 50 MG
5 TABLET ORAL EVERY 6 HOURS
Refills: 0 | Status: DISCONTINUED | OUTPATIENT
Start: 2022-01-01 | End: 2022-01-01

## 2022-01-01 RX ORDER — HYDROMORPHONE HYDROCHLORIDE 2 MG/ML
2 INJECTION INTRAMUSCULAR; INTRAVENOUS; SUBCUTANEOUS
Refills: 0 | Status: DISCONTINUED | OUTPATIENT
Start: 2022-01-01 | End: 2022-01-01

## 2022-01-01 RX ORDER — AZITHROMYCIN 500 MG/1
500 TABLET, FILM COATED ORAL DAILY
Refills: 0 | Status: COMPLETED | OUTPATIENT
Start: 2022-01-01 | End: 2022-01-01

## 2022-01-01 RX ORDER — SODIUM CHLORIDE 9 MG/ML
1000 INJECTION INTRAMUSCULAR; INTRAVENOUS; SUBCUTANEOUS
Refills: 0 | Status: DISCONTINUED | OUTPATIENT
Start: 2022-01-01 | End: 2022-01-01

## 2022-01-01 RX ORDER — IPRATROPIUM/ALBUTEROL SULFATE 18-103MCG
3 AEROSOL WITH ADAPTER (GRAM) INHALATION ONCE
Refills: 0 | Status: COMPLETED | OUTPATIENT
Start: 2022-01-01 | End: 2022-01-01

## 2022-01-01 RX ORDER — PIPERACILLIN AND TAZOBACTAM 4; .5 G/20ML; G/20ML
2.25 INJECTION, POWDER, LYOPHILIZED, FOR SOLUTION INTRAVENOUS EVERY 6 HOURS
Refills: 0 | Status: DISCONTINUED | OUTPATIENT
Start: 2022-01-01 | End: 2022-01-01

## 2022-01-01 RX ORDER — NOREPINEPHRINE BITARTRATE/D5W 8 MG/250ML
0.06 PLASTIC BAG, INJECTION (ML) INTRAVENOUS
Qty: 8 | Refills: 0 | Status: DISCONTINUED | OUTPATIENT
Start: 2022-01-01 | End: 2022-01-01

## 2022-01-01 RX ORDER — SODIUM CHLORIDE 9 MG/ML
250 INJECTION, SOLUTION INTRAVENOUS ONCE
Refills: 0 | Status: COMPLETED | OUTPATIENT
Start: 2022-01-01 | End: 2022-01-01

## 2022-01-01 RX ORDER — DEXMEDETOMIDINE HYDROCHLORIDE IN 0.9% SODIUM CHLORIDE 4 UG/ML
0.2 INJECTION INTRAVENOUS
Qty: 400 | Refills: 0 | Status: DISCONTINUED | OUTPATIENT
Start: 2022-01-01 | End: 2022-01-01

## 2022-01-01 RX ORDER — HYDROMORPHONE HYDROCHLORIDE 2 MG/ML
0.25 INJECTION INTRAMUSCULAR; INTRAVENOUS; SUBCUTANEOUS ONCE
Refills: 0 | Status: DISCONTINUED | OUTPATIENT
Start: 2022-01-01 | End: 2022-01-01

## 2022-01-01 RX ORDER — SODIUM CHLORIDE 9 MG/ML
500 INJECTION INTRAMUSCULAR; INTRAVENOUS; SUBCUTANEOUS ONCE
Refills: 0 | Status: COMPLETED | OUTPATIENT
Start: 2022-01-01 | End: 2022-01-01

## 2022-01-01 RX ORDER — SODIUM CHLORIDE 9 MG/ML
1000 INJECTION, SOLUTION INTRAVENOUS
Refills: 0 | Status: DISCONTINUED | OUTPATIENT
Start: 2022-01-01 | End: 2022-01-01

## 2022-01-01 RX ORDER — SODIUM CHLORIDE 9 MG/ML
1000 INJECTION, SOLUTION INTRAVENOUS ONCE
Refills: 0 | Status: DISCONTINUED | OUTPATIENT
Start: 2022-01-01 | End: 2022-01-01

## 2022-01-01 RX ORDER — SODIUM BICARBONATE 1 MEQ/ML
50 SYRINGE (ML) INTRAVENOUS ONCE
Refills: 0 | Status: DISCONTINUED | OUTPATIENT
Start: 2022-01-01 | End: 2022-01-01

## 2022-01-01 RX ORDER — ACETYLCYSTEINE 200 MG/ML
4 VIAL (ML) MISCELLANEOUS EVERY 4 HOURS
Refills: 0 | Status: DISCONTINUED | OUTPATIENT
Start: 2022-01-01 | End: 2022-01-01

## 2022-01-01 RX ORDER — VANCOMYCIN HCL 1 G
1000 VIAL (EA) INTRAVENOUS ONCE
Refills: 0 | Status: DISCONTINUED | OUTPATIENT
Start: 2022-01-01 | End: 2022-01-01

## 2022-01-01 RX ORDER — SODIUM CHLORIDE 9 MG/ML
1000 INJECTION, SOLUTION INTRAVENOUS ONCE
Refills: 0 | Status: COMPLETED | OUTPATIENT
Start: 2022-01-01 | End: 2022-01-01

## 2022-01-01 RX ORDER — ACETAMINOPHEN 500 MG
1000 TABLET ORAL ONCE
Refills: 0 | Status: COMPLETED | OUTPATIENT
Start: 2022-01-01 | End: 2022-01-01

## 2022-01-01 RX ORDER — METOPROLOL TARTRATE 50 MG
12.5 TABLET ORAL EVERY 12 HOURS
Refills: 0 | Status: DISCONTINUED | OUTPATIENT
Start: 2022-01-01 | End: 2022-01-01

## 2022-01-01 RX ORDER — VANCOMYCIN HCL 1 G
1500 VIAL (EA) INTRAVENOUS ONCE
Refills: 0 | Status: COMPLETED | OUTPATIENT
Start: 2022-01-01 | End: 2022-01-01

## 2022-01-01 RX ORDER — FUROSEMIDE 40 MG
1 TABLET ORAL
Qty: 0 | Refills: 0 | DISCHARGE

## 2022-01-01 RX ORDER — HYDROMORPHONE HYDROCHLORIDE 2 MG/ML
2 INJECTION INTRAMUSCULAR; INTRAVENOUS; SUBCUTANEOUS ONCE
Refills: 0 | Status: DISCONTINUED | OUTPATIENT
Start: 2022-01-01 | End: 2022-01-01

## 2022-01-01 RX ORDER — ATORVASTATIN CALCIUM 80 MG/1
10 TABLET, FILM COATED ORAL AT BEDTIME
Refills: 0 | Status: DISCONTINUED | OUTPATIENT
Start: 2022-01-01 | End: 2022-01-01

## 2022-01-01 RX ORDER — ACETAMINOPHEN 500 MG
650 TABLET ORAL EVERY 6 HOURS
Refills: 0 | Status: DISCONTINUED | OUTPATIENT
Start: 2022-01-01 | End: 2022-01-01

## 2022-01-01 RX ORDER — PANTOPRAZOLE SODIUM 20 MG/1
40 TABLET, DELAYED RELEASE ORAL EVERY 24 HOURS
Refills: 0 | Status: DISCONTINUED | OUTPATIENT
Start: 2022-01-01 | End: 2022-01-01

## 2022-01-01 RX ORDER — ROBINUL 0.2 MG/ML
0.4 INJECTION INTRAMUSCULAR; INTRAVENOUS EVERY 4 HOURS
Refills: 0 | Status: DISCONTINUED | OUTPATIENT
Start: 2022-01-01 | End: 2022-01-01

## 2022-01-01 RX ORDER — ESTROGENS, CONJUGATED 0.625 MG
1 TABLET ORAL
Qty: 0 | Refills: 0 | DISCHARGE

## 2022-01-01 RX ORDER — METOPROLOL TARTRATE 50 MG
2.5 TABLET ORAL EVERY 6 HOURS
Refills: 0 | Status: DISCONTINUED | OUTPATIENT
Start: 2022-01-01 | End: 2022-01-01

## 2022-01-01 RX ORDER — SODIUM CHLORIDE 9 MG/ML
250 INJECTION INTRAMUSCULAR; INTRAVENOUS; SUBCUTANEOUS ONCE
Refills: 0 | Status: DISCONTINUED | OUTPATIENT
Start: 2022-01-01 | End: 2022-01-01

## 2022-01-01 RX ORDER — VALSARTAN 80 MG/1
1 TABLET ORAL
Qty: 0 | Refills: 0 | DISCHARGE

## 2022-01-01 RX ORDER — CHLORHEXIDINE GLUCONATE 213 G/1000ML
15 SOLUTION TOPICAL EVERY 12 HOURS
Refills: 0 | Status: DISCONTINUED | OUTPATIENT
Start: 2022-01-01 | End: 2022-01-01

## 2022-01-01 RX ORDER — DESIPRAMINE HYDROCHLORIDE 100 MG/1
1 TABLET ORAL
Qty: 0 | Refills: 0 | DISCHARGE

## 2022-01-01 RX ORDER — CEFTRIAXONE 500 MG/1
1000 INJECTION, POWDER, FOR SOLUTION INTRAMUSCULAR; INTRAVENOUS EVERY 24 HOURS
Refills: 0 | Status: DISCONTINUED | OUTPATIENT
Start: 2022-01-01 | End: 2022-01-01

## 2022-01-01 RX ORDER — ROBINUL 0.2 MG/ML
0.4 INJECTION INTRAMUSCULAR; INTRAVENOUS EVERY 6 HOURS
Refills: 0 | Status: DISCONTINUED | OUTPATIENT
Start: 2022-01-01 | End: 2022-01-01

## 2022-01-01 RX ORDER — NOREPINEPHRINE BITARTRATE/D5W 8 MG/250ML
0.05 PLASTIC BAG, INJECTION (ML) INTRAVENOUS
Qty: 8 | Refills: 0 | Status: DISCONTINUED | OUTPATIENT
Start: 2022-01-01 | End: 2022-01-01

## 2022-01-01 RX ORDER — PIPERACILLIN AND TAZOBACTAM 4; .5 G/20ML; G/20ML
4.5 INJECTION, POWDER, LYOPHILIZED, FOR SOLUTION INTRAVENOUS EVERY 6 HOURS
Refills: 0 | Status: DISCONTINUED | OUTPATIENT
Start: 2022-01-01 | End: 2022-01-01

## 2022-01-01 RX ORDER — LEVOTHYROXINE SODIUM 125 MCG
75 TABLET ORAL DAILY
Refills: 0 | Status: DISCONTINUED | OUTPATIENT
Start: 2022-01-01 | End: 2022-01-01

## 2022-01-01 RX ORDER — VANCOMYCIN HCL 1 G
1250 VIAL (EA) INTRAVENOUS EVERY 24 HOURS
Refills: 0 | Status: DISCONTINUED | OUTPATIENT
Start: 2022-01-01 | End: 2022-01-01

## 2022-01-01 RX ORDER — SODIUM CHLORIDE 9 MG/ML
1000 INJECTION INTRAMUSCULAR; INTRAVENOUS; SUBCUTANEOUS ONCE
Refills: 0 | Status: COMPLETED | OUTPATIENT
Start: 2022-01-01 | End: 2022-01-01

## 2022-01-01 RX ORDER — ATORVASTATIN CALCIUM 80 MG/1
40 TABLET, FILM COATED ORAL AT BEDTIME
Refills: 0 | Status: DISCONTINUED | OUTPATIENT
Start: 2022-01-01 | End: 2022-01-01

## 2022-01-01 RX ORDER — ACETAMINOPHEN 500 MG
975 TABLET ORAL ONCE
Refills: 0 | Status: COMPLETED | OUTPATIENT
Start: 2022-01-01 | End: 2022-01-01

## 2022-01-01 RX ORDER — HEPARIN SODIUM 5000 [USP'U]/ML
5000 INJECTION INTRAVENOUS; SUBCUTANEOUS EVERY 8 HOURS
Refills: 0 | Status: DISCONTINUED | OUTPATIENT
Start: 2022-01-01 | End: 2022-01-01

## 2022-01-01 RX ORDER — SODIUM BICARBONATE 1 MEQ/ML
50 SYRINGE (ML) INTRAVENOUS ONCE
Refills: 0 | Status: COMPLETED | OUTPATIENT
Start: 2022-01-01 | End: 2022-01-01

## 2022-01-01 RX ORDER — VANCOMYCIN HCL 1 G
1250 VIAL (EA) INTRAVENOUS ONCE
Refills: 0 | Status: COMPLETED | OUTPATIENT
Start: 2022-01-01 | End: 2022-01-01

## 2022-01-01 RX ORDER — IPRATROPIUM/ALBUTEROL SULFATE 18-103MCG
3 AEROSOL WITH ADAPTER (GRAM) INHALATION EVERY 6 HOURS
Refills: 0 | Status: DISCONTINUED | OUTPATIENT
Start: 2022-01-01 | End: 2022-01-01

## 2022-01-01 RX ORDER — HYDROMORPHONE HYDROCHLORIDE 2 MG/ML
0.5 INJECTION INTRAMUSCULAR; INTRAVENOUS; SUBCUTANEOUS ONCE
Refills: 0 | Status: DISCONTINUED | OUTPATIENT
Start: 2022-01-01 | End: 2022-01-01

## 2022-01-01 RX ORDER — PROPOFOL 10 MG/ML
5 INJECTION, EMULSION INTRAVENOUS
Qty: 1000 | Refills: 0 | Status: DISCONTINUED | OUTPATIENT
Start: 2022-01-01 | End: 2022-01-01

## 2022-01-01 RX ORDER — LANOLIN ALCOHOL/MO/W.PET/CERES
3 CREAM (GRAM) TOPICAL AT BEDTIME
Refills: 0 | Status: DISCONTINUED | OUTPATIENT
Start: 2022-01-01 | End: 2022-01-01

## 2022-01-01 RX ORDER — ROBINUL 0.2 MG/ML
0.2 INJECTION INTRAMUSCULAR; INTRAVENOUS EVERY 4 HOURS
Refills: 0 | Status: DISCONTINUED | OUTPATIENT
Start: 2022-01-01 | End: 2022-01-01

## 2022-01-01 RX ORDER — PIPERACILLIN AND TAZOBACTAM 4; .5 G/20ML; G/20ML
3.38 INJECTION, POWDER, LYOPHILIZED, FOR SOLUTION INTRAVENOUS EVERY 8 HOURS
Refills: 0 | Status: DISCONTINUED | OUTPATIENT
Start: 2022-01-01 | End: 2022-01-01

## 2022-01-01 RX ORDER — AZTREONAM 2 G
1 VIAL (EA) INJECTION
Qty: 14 | Refills: 0
Start: 2022-01-01 | End: 2022-01-01

## 2022-01-01 RX ORDER — FUROSEMIDE 40 MG
40 TABLET ORAL EVERY 12 HOURS
Refills: 0 | Status: DISCONTINUED | OUTPATIENT
Start: 2022-01-01 | End: 2022-01-01

## 2022-01-01 RX ORDER — CEFTRIAXONE 500 MG/1
1000 INJECTION, POWDER, FOR SOLUTION INTRAMUSCULAR; INTRAVENOUS ONCE
Refills: 0 | Status: COMPLETED | OUTPATIENT
Start: 2022-01-01 | End: 2022-01-01

## 2022-01-01 RX ORDER — OMEPRAZOLE 10 MG/1
1 CAPSULE, DELAYED RELEASE ORAL
Qty: 0 | Refills: 0 | DISCHARGE

## 2022-01-01 RX ORDER — ACETAMINOPHEN 500 MG
1000 TABLET ORAL ONCE
Refills: 0 | Status: DISCONTINUED | OUTPATIENT
Start: 2022-01-01 | End: 2022-01-01

## 2022-01-01 RX ORDER — ROBINUL 0.2 MG/ML
0.2 INJECTION INTRAMUSCULAR; INTRAVENOUS ONCE
Refills: 0 | Status: COMPLETED | OUTPATIENT
Start: 2022-01-01 | End: 2022-01-01

## 2022-01-01 RX ORDER — SODIUM CHLORIDE 9 MG/ML
2100 INJECTION INTRAMUSCULAR; INTRAVENOUS; SUBCUTANEOUS ONCE
Refills: 0 | Status: COMPLETED | OUTPATIENT
Start: 2022-01-01 | End: 2022-01-01

## 2022-01-01 RX ORDER — CHLORHEXIDINE GLUCONATE 213 G/1000ML
1 SOLUTION TOPICAL
Refills: 0 | Status: DISCONTINUED | OUTPATIENT
Start: 2022-01-01 | End: 2022-01-01

## 2022-01-01 RX ORDER — ROSUVASTATIN CALCIUM 5 MG/1
1 TABLET ORAL
Qty: 0 | Refills: 0 | DISCHARGE

## 2022-01-01 RX ORDER — PANTOPRAZOLE SODIUM 20 MG/1
40 TABLET, DELAYED RELEASE ORAL
Refills: 0 | Status: DISCONTINUED | OUTPATIENT
Start: 2022-01-01 | End: 2022-01-01

## 2022-01-01 RX ORDER — LEVOTHYROXINE SODIUM 125 MCG
60 TABLET ORAL AT BEDTIME
Refills: 0 | Status: DISCONTINUED | OUTPATIENT
Start: 2022-01-01 | End: 2022-01-01

## 2022-01-01 RX ORDER — HYDROMORPHONE HYDROCHLORIDE 2 MG/ML
1 INJECTION INTRAMUSCULAR; INTRAVENOUS; SUBCUTANEOUS EVERY 4 HOURS
Refills: 0 | Status: DISCONTINUED | OUTPATIENT
Start: 2022-01-01 | End: 2022-01-01

## 2022-01-01 RX ORDER — PIPERACILLIN AND TAZOBACTAM 4; .5 G/20ML; G/20ML
4.5 INJECTION, POWDER, LYOPHILIZED, FOR SOLUTION INTRAVENOUS EVERY 12 HOURS
Refills: 0 | Status: DISCONTINUED | OUTPATIENT
Start: 2022-01-01 | End: 2022-01-01

## 2022-01-01 RX ORDER — LEVOTHYROXINE SODIUM 125 MCG
1 TABLET ORAL
Qty: 0 | Refills: 0 | DISCHARGE

## 2022-01-01 RX ORDER — LIDOCAINE 4 G/100G
2 CREAM TOPICAL ONCE
Refills: 0 | Status: COMPLETED | OUTPATIENT
Start: 2022-01-01 | End: 2022-01-01

## 2022-01-01 RX ORDER — BUDESONIDE AND FORMOTEROL FUMARATE DIHYDRATE 160; 4.5 UG/1; UG/1
1 AEROSOL RESPIRATORY (INHALATION)
Refills: 0 | Status: DISCONTINUED | OUTPATIENT
Start: 2022-01-01 | End: 2022-01-01

## 2022-01-01 RX ORDER — PIPERACILLIN AND TAZOBACTAM 4; .5 G/20ML; G/20ML
3.38 INJECTION, POWDER, LYOPHILIZED, FOR SOLUTION INTRAVENOUS ONCE
Refills: 0 | Status: COMPLETED | OUTPATIENT
Start: 2022-01-01 | End: 2022-01-01

## 2022-01-01 RX ADMIN — HEPARIN SODIUM 5000 UNIT(S): 5000 INJECTION INTRAVENOUS; SUBCUTANEOUS at 21:00

## 2022-01-01 RX ADMIN — CHLORHEXIDINE GLUCONATE 1 APPLICATION(S): 213 SOLUTION TOPICAL at 05:40

## 2022-01-01 RX ADMIN — Medication 1000 MILLIGRAM(S): at 12:14

## 2022-01-01 RX ADMIN — Medication 3 MILLILITER(S): at 03:43

## 2022-01-01 RX ADMIN — BUDESONIDE AND FORMOTEROL FUMARATE DIHYDRATE 1 PUFF(S): 160; 4.5 AEROSOL RESPIRATORY (INHALATION) at 12:00

## 2022-01-01 RX ADMIN — Medication 400 MILLIGRAM(S): at 13:51

## 2022-01-01 RX ADMIN — Medication 3 MILLILITER(S): at 05:28

## 2022-01-01 RX ADMIN — HEPARIN SODIUM 5000 UNIT(S): 5000 INJECTION INTRAVENOUS; SUBCUTANEOUS at 05:16

## 2022-01-01 RX ADMIN — Medication 75 MICROGRAM(S): at 07:17

## 2022-01-01 RX ADMIN — HYDROMORPHONE HYDROCHLORIDE 1 MILLIGRAM(S): 2 INJECTION INTRAMUSCULAR; INTRAVENOUS; SUBCUTANEOUS at 22:05

## 2022-01-01 RX ADMIN — Medication 975 MILLIGRAM(S): at 01:38

## 2022-01-01 RX ADMIN — ROBINUL 0.2 MILLIGRAM(S): 0.2 INJECTION INTRAMUSCULAR; INTRAVENOUS at 00:11

## 2022-01-01 RX ADMIN — SODIUM CHLORIDE 500 MILLILITER(S): 9 INJECTION INTRAMUSCULAR; INTRAVENOUS; SUBCUTANEOUS at 17:48

## 2022-01-01 RX ADMIN — Medication 75 MICROGRAM(S): at 06:03

## 2022-01-01 RX ADMIN — HYDROMORPHONE HYDROCHLORIDE 1 MILLIGRAM(S): 2 INJECTION INTRAMUSCULAR; INTRAVENOUS; SUBCUTANEOUS at 02:34

## 2022-01-01 RX ADMIN — HYDROMORPHONE HYDROCHLORIDE 1 MILLIGRAM(S): 2 INJECTION INTRAMUSCULAR; INTRAVENOUS; SUBCUTANEOUS at 03:24

## 2022-01-01 RX ADMIN — Medication 2.5 MILLIGRAM(S): at 12:50

## 2022-01-01 RX ADMIN — Medication 4 MILLILITER(S): at 01:23

## 2022-01-01 RX ADMIN — HEPARIN SODIUM 5000 UNIT(S): 5000 INJECTION INTRAVENOUS; SUBCUTANEOUS at 06:03

## 2022-01-01 RX ADMIN — Medication 3 MILLILITER(S): at 01:22

## 2022-01-01 RX ADMIN — Medication 1000 MILLIGRAM(S): at 22:16

## 2022-01-01 RX ADMIN — Medication 40 MILLIGRAM(S): at 00:57

## 2022-01-01 RX ADMIN — Medication 40 MILLIGRAM(S): at 05:54

## 2022-01-01 RX ADMIN — Medication 3 MILLILITER(S): at 09:44

## 2022-01-01 RX ADMIN — HEPARIN SODIUM 5000 UNIT(S): 5000 INJECTION INTRAVENOUS; SUBCUTANEOUS at 22:17

## 2022-01-01 RX ADMIN — Medication 2.5 MILLIGRAM(S): at 18:30

## 2022-01-01 RX ADMIN — Medication 1000 MILLIGRAM(S): at 13:34

## 2022-01-01 RX ADMIN — Medication 4 MILLILITER(S): at 18:46

## 2022-01-01 RX ADMIN — Medication 400 MILLIGRAM(S): at 02:03

## 2022-01-01 RX ADMIN — Medication 3 MILLILITER(S): at 15:07

## 2022-01-01 RX ADMIN — Medication 50 MILLIEQUIVALENT(S): at 15:46

## 2022-01-01 RX ADMIN — Medication 4 MILLILITER(S): at 21:32

## 2022-01-01 RX ADMIN — Medication 3 MILLILITER(S): at 21:03

## 2022-01-01 RX ADMIN — Medication 650 MILLIGRAM(S): at 05:17

## 2022-01-01 RX ADMIN — Medication 4 MILLILITER(S): at 15:06

## 2022-01-01 RX ADMIN — LIDOCAINE 2 PATCH: 4 CREAM TOPICAL at 08:57

## 2022-01-01 RX ADMIN — Medication 3 MILLILITER(S): at 09:49

## 2022-01-01 RX ADMIN — Medication 2.5 MILLIGRAM(S): at 23:34

## 2022-01-01 RX ADMIN — Medication 60 MICROGRAM(S): at 23:33

## 2022-01-01 RX ADMIN — SODIUM CHLORIDE 100 MILLILITER(S): 9 INJECTION, SOLUTION INTRAVENOUS at 16:56

## 2022-01-01 RX ADMIN — HYDROMORPHONE HYDROCHLORIDE 1 MILLIGRAM(S): 2 INJECTION INTRAMUSCULAR; INTRAVENOUS; SUBCUTANEOUS at 12:09

## 2022-01-01 RX ADMIN — SODIUM CHLORIDE 500 MILLILITER(S): 9 INJECTION, SOLUTION INTRAVENOUS at 06:30

## 2022-01-01 RX ADMIN — Medication 3 MILLILITER(S): at 14:27

## 2022-01-01 RX ADMIN — PANTOPRAZOLE SODIUM 40 MILLIGRAM(S): 20 TABLET, DELAYED RELEASE ORAL at 21:00

## 2022-01-01 RX ADMIN — Medication 4 MILLILITER(S): at 09:15

## 2022-01-01 RX ADMIN — DEXMEDETOMIDINE HYDROCHLORIDE IN 0.9% SODIUM CHLORIDE 4.37 MICROGRAM(S)/KG/HR: 4 INJECTION INTRAVENOUS at 09:22

## 2022-01-01 RX ADMIN — HYDROMORPHONE HYDROCHLORIDE 1 MILLIGRAM(S): 2 INJECTION INTRAMUSCULAR; INTRAVENOUS; SUBCUTANEOUS at 21:17

## 2022-01-01 RX ADMIN — Medication 4 MILLILITER(S): at 09:44

## 2022-01-01 RX ADMIN — Medication 40 MILLIGRAM(S): at 06:02

## 2022-01-01 RX ADMIN — Medication 2.5 MILLIGRAM(S): at 06:03

## 2022-01-01 RX ADMIN — Medication 3 MILLIGRAM(S): at 02:13

## 2022-01-01 RX ADMIN — Medication 3 MILLILITER(S): at 13:51

## 2022-01-01 RX ADMIN — PIPERACILLIN AND TAZOBACTAM 200 GRAM(S): 4; .5 INJECTION, POWDER, LYOPHILIZED, FOR SOLUTION INTRAVENOUS at 11:58

## 2022-01-01 RX ADMIN — BUDESONIDE AND FORMOTEROL FUMARATE DIHYDRATE 1 PUFF(S): 160; 4.5 AEROSOL RESPIRATORY (INHALATION) at 10:48

## 2022-01-01 RX ADMIN — Medication 3 MILLIGRAM(S): at 21:01

## 2022-01-01 RX ADMIN — Medication 4 MILLILITER(S): at 05:28

## 2022-01-01 RX ADMIN — Medication 0.5 MILLIGRAM(S): at 21:00

## 2022-01-01 RX ADMIN — PIPERACILLIN AND TAZOBACTAM 3.33 GRAM(S): 4; .5 INJECTION, POWDER, LYOPHILIZED, FOR SOLUTION INTRAVENOUS at 21:19

## 2022-01-01 RX ADMIN — Medication 3 MILLILITER(S): at 01:11

## 2022-01-01 RX ADMIN — BUDESONIDE AND FORMOTEROL FUMARATE DIHYDRATE 1 PUFF(S): 160; 4.5 AEROSOL RESPIRATORY (INHALATION) at 20:49

## 2022-01-01 RX ADMIN — HEPARIN SODIUM 5000 UNIT(S): 5000 INJECTION INTRAVENOUS; SUBCUTANEOUS at 14:25

## 2022-01-01 RX ADMIN — HEPARIN SODIUM 5000 UNIT(S): 5000 INJECTION INTRAVENOUS; SUBCUTANEOUS at 06:38

## 2022-01-01 RX ADMIN — LIDOCAINE 2 PATCH: 4 CREAM TOPICAL at 16:46

## 2022-01-01 RX ADMIN — HYDROMORPHONE HYDROCHLORIDE 1 MILLIGRAM(S): 2 INJECTION INTRAMUSCULAR; INTRAVENOUS; SUBCUTANEOUS at 12:40

## 2022-01-01 RX ADMIN — HYDROMORPHONE HYDROCHLORIDE 0.25 MILLIGRAM(S): 2 INJECTION INTRAMUSCULAR; INTRAVENOUS; SUBCUTANEOUS at 18:45

## 2022-01-01 RX ADMIN — Medication 1 MILLIGRAM(S): at 14:26

## 2022-01-01 RX ADMIN — HYDROMORPHONE HYDROCHLORIDE 0.5 MILLIGRAM(S): 2 INJECTION INTRAMUSCULAR; INTRAVENOUS; SUBCUTANEOUS at 22:10

## 2022-01-01 RX ADMIN — HYDROMORPHONE HYDROCHLORIDE 1 MILLIGRAM(S): 2 INJECTION INTRAMUSCULAR; INTRAVENOUS; SUBCUTANEOUS at 14:03

## 2022-01-01 RX ADMIN — BUDESONIDE AND FORMOTEROL FUMARATE DIHYDRATE 1 PUFF(S): 160; 4.5 AEROSOL RESPIRATORY (INHALATION) at 21:26

## 2022-01-01 RX ADMIN — HYDROMORPHONE HYDROCHLORIDE 2 MILLIGRAM(S): 2 INJECTION INTRAMUSCULAR; INTRAVENOUS; SUBCUTANEOUS at 19:20

## 2022-01-01 RX ADMIN — Medication 1000 MILLIGRAM(S): at 01:15

## 2022-01-01 RX ADMIN — Medication 3 MILLILITER(S): at 09:41

## 2022-01-01 RX ADMIN — SODIUM CHLORIDE 2100 MILLILITER(S): 9 INJECTION INTRAMUSCULAR; INTRAVENOUS; SUBCUTANEOUS at 01:09

## 2022-01-01 RX ADMIN — Medication 40 MILLIGRAM(S): at 18:31

## 2022-01-01 RX ADMIN — Medication 3 MILLILITER(S): at 01:23

## 2022-01-01 RX ADMIN — Medication 40 MILLIGRAM(S): at 22:40

## 2022-01-01 RX ADMIN — Medication 400 MILLIGRAM(S): at 00:23

## 2022-01-01 RX ADMIN — Medication 1000 MILLIGRAM(S): at 13:57

## 2022-01-01 RX ADMIN — AZITHROMYCIN 500 MILLIGRAM(S): 500 TABLET, FILM COATED ORAL at 11:13

## 2022-01-01 RX ADMIN — AZITHROMYCIN 500 MILLIGRAM(S): 500 TABLET, FILM COATED ORAL at 12:11

## 2022-01-01 RX ADMIN — Medication 2.5 MILLIGRAM(S): at 19:21

## 2022-01-01 RX ADMIN — Medication 1000 MILLIGRAM(S): at 11:00

## 2022-01-01 RX ADMIN — HYDROMORPHONE HYDROCHLORIDE 0.5 MILLIGRAM(S): 2 INJECTION INTRAMUSCULAR; INTRAVENOUS; SUBCUTANEOUS at 21:54

## 2022-01-01 RX ADMIN — HEPARIN SODIUM 5000 UNIT(S): 5000 INJECTION INTRAVENOUS; SUBCUTANEOUS at 05:55

## 2022-01-01 RX ADMIN — Medication 4 MILLILITER(S): at 14:11

## 2022-01-01 RX ADMIN — Medication 2.5 MILLIGRAM(S): at 18:31

## 2022-01-01 RX ADMIN — PANTOPRAZOLE SODIUM 40 MILLIGRAM(S): 20 TABLET, DELAYED RELEASE ORAL at 05:18

## 2022-01-01 RX ADMIN — PANTOPRAZOLE SODIUM 40 MILLIGRAM(S): 20 TABLET, DELAYED RELEASE ORAL at 21:58

## 2022-01-01 RX ADMIN — Medication 4 MILLILITER(S): at 21:16

## 2022-01-01 RX ADMIN — CEFTRIAXONE 100 MILLIGRAM(S): 500 INJECTION, POWDER, FOR SOLUTION INTRAMUSCULAR; INTRAVENOUS at 10:35

## 2022-01-01 RX ADMIN — Medication 2.5 MILLIGRAM(S): at 11:03

## 2022-01-01 RX ADMIN — Medication 1000 MILLIGRAM(S): at 03:00

## 2022-01-01 RX ADMIN — ATORVASTATIN CALCIUM 40 MILLIGRAM(S): 80 TABLET, FILM COATED ORAL at 22:24

## 2022-01-01 RX ADMIN — HEPARIN SODIUM 5000 UNIT(S): 5000 INJECTION INTRAVENOUS; SUBCUTANEOUS at 07:19

## 2022-01-01 RX ADMIN — Medication 3 MILLILITER(S): at 18:33

## 2022-01-01 RX ADMIN — Medication 4 MILLILITER(S): at 06:11

## 2022-01-01 RX ADMIN — Medication 650 MILLIGRAM(S): at 02:40

## 2022-01-01 RX ADMIN — HYDROMORPHONE HYDROCHLORIDE 2 MILLIGRAM(S): 2 INJECTION INTRAMUSCULAR; INTRAVENOUS; SUBCUTANEOUS at 19:50

## 2022-01-01 RX ADMIN — HEPARIN SODIUM 5000 UNIT(S): 5000 INJECTION INTRAVENOUS; SUBCUTANEOUS at 21:57

## 2022-01-01 RX ADMIN — CEFTRIAXONE 100 MILLIGRAM(S): 500 INJECTION, POWDER, FOR SOLUTION INTRAMUSCULAR; INTRAVENOUS at 18:50

## 2022-01-01 RX ADMIN — Medication 5 MILLIGRAM(S): at 09:51

## 2022-01-01 RX ADMIN — ATORVASTATIN CALCIUM 40 MILLIGRAM(S): 80 TABLET, FILM COATED ORAL at 22:16

## 2022-01-01 RX ADMIN — Medication 2.5 MILLIGRAM(S): at 00:03

## 2022-01-01 RX ADMIN — Medication 3 MILLILITER(S): at 06:28

## 2022-01-01 RX ADMIN — PIPERACILLIN AND TAZOBACTAM 200 GRAM(S): 4; .5 INJECTION, POWDER, LYOPHILIZED, FOR SOLUTION INTRAVENOUS at 04:44

## 2022-01-01 RX ADMIN — Medication 4 MILLILITER(S): at 01:22

## 2022-01-01 RX ADMIN — SODIUM CHLORIDE 500 MILLILITER(S): 9 INJECTION, SOLUTION INTRAVENOUS at 06:38

## 2022-01-01 RX ADMIN — HEPARIN SODIUM 5000 UNIT(S): 5000 INJECTION INTRAVENOUS; SUBCUTANEOUS at 13:23

## 2022-01-01 RX ADMIN — PROPOFOL 2.62 MICROGRAM(S)/KG/MIN: 10 INJECTION, EMULSION INTRAVENOUS at 01:06

## 2022-01-01 RX ADMIN — HEPARIN SODIUM 5000 UNIT(S): 5000 INJECTION INTRAVENOUS; SUBCUTANEOUS at 06:29

## 2022-01-01 RX ADMIN — Medication 4 MILLILITER(S): at 06:27

## 2022-01-01 RX ADMIN — SODIUM CHLORIDE 2000 MILLILITER(S): 9 INJECTION INTRAMUSCULAR; INTRAVENOUS; SUBCUTANEOUS at 05:38

## 2022-01-01 RX ADMIN — BUDESONIDE AND FORMOTEROL FUMARATE DIHYDRATE 1 PUFF(S): 160; 4.5 AEROSOL RESPIRATORY (INHALATION) at 08:53

## 2022-01-01 RX ADMIN — Medication 300 MILLIGRAM(S): at 22:19

## 2022-01-01 RX ADMIN — Medication 3 MILLILITER(S): at 01:02

## 2022-01-01 RX ADMIN — Medication 3 MILLILITER(S): at 23:14

## 2022-01-01 RX ADMIN — SODIUM CHLORIDE 100 MILLILITER(S): 9 INJECTION, SOLUTION INTRAVENOUS at 13:21

## 2022-01-01 RX ADMIN — SODIUM CHLORIDE 80 MILLILITER(S): 9 INJECTION, SOLUTION INTRAVENOUS at 11:31

## 2022-01-01 RX ADMIN — PANTOPRAZOLE SODIUM 40 MILLIGRAM(S): 20 TABLET, DELAYED RELEASE ORAL at 06:03

## 2022-01-01 RX ADMIN — PIPERACILLIN AND TAZOBACTAM 3.33 GRAM(S): 4; .5 INJECTION, POWDER, LYOPHILIZED, FOR SOLUTION INTRAVENOUS at 09:07

## 2022-01-01 RX ADMIN — Medication 3 MILLILITER(S): at 21:33

## 2022-01-01 RX ADMIN — HEPARIN SODIUM 5000 UNIT(S): 5000 INJECTION INTRAVENOUS; SUBCUTANEOUS at 23:11

## 2022-01-01 RX ADMIN — BUDESONIDE AND FORMOTEROL FUMARATE DIHYDRATE 1 PUFF(S): 160; 4.5 AEROSOL RESPIRATORY (INHALATION) at 22:45

## 2022-01-01 RX ADMIN — SODIUM CHLORIDE 250 MILLILITER(S): 9 INJECTION, SOLUTION INTRAVENOUS at 10:35

## 2022-01-01 RX ADMIN — Medication 3 MILLILITER(S): at 09:15

## 2022-01-01 RX ADMIN — HEPARIN SODIUM 5000 UNIT(S): 5000 INJECTION INTRAVENOUS; SUBCUTANEOUS at 22:27

## 2022-01-01 RX ADMIN — HEPARIN SODIUM 5000 UNIT(S): 5000 INJECTION INTRAVENOUS; SUBCUTANEOUS at 14:00

## 2022-01-01 RX ADMIN — Medication 2.5 MILLIGRAM(S): at 05:54

## 2022-01-01 RX ADMIN — ATORVASTATIN CALCIUM 40 MILLIGRAM(S): 80 TABLET, FILM COATED ORAL at 22:26

## 2022-01-01 RX ADMIN — ROBINUL 0.2 MILLIGRAM(S): 0.2 INJECTION INTRAMUSCULAR; INTRAVENOUS at 02:27

## 2022-01-01 RX ADMIN — Medication 3 MILLILITER(S): at 17:50

## 2022-01-01 RX ADMIN — Medication 4 MILLILITER(S): at 21:19

## 2022-01-01 RX ADMIN — Medication 3 MILLILITER(S): at 06:10

## 2022-01-01 RX ADMIN — Medication 1000 MILLIGRAM(S): at 01:23

## 2022-01-01 RX ADMIN — Medication 3 MILLILITER(S): at 14:00

## 2022-01-01 RX ADMIN — AZITHROMYCIN 500 MILLIGRAM(S): 500 TABLET, FILM COATED ORAL at 12:22

## 2022-01-01 RX ADMIN — Medication 75 MILLIGRAM(S): at 03:10

## 2022-01-01 RX ADMIN — Medication 40 MILLIGRAM(S): at 00:55

## 2022-01-01 RX ADMIN — PIPERACILLIN AND TAZOBACTAM 200 GRAM(S): 4; .5 INJECTION, POWDER, LYOPHILIZED, FOR SOLUTION INTRAVENOUS at 15:00

## 2022-01-01 RX ADMIN — Medication 650 MILLIGRAM(S): at 06:00

## 2022-01-01 RX ADMIN — HEPARIN SODIUM 5000 UNIT(S): 5000 INJECTION INTRAVENOUS; SUBCUTANEOUS at 15:01

## 2022-01-01 RX ADMIN — Medication 166.67 MILLIGRAM(S): at 10:03

## 2022-01-01 RX ADMIN — CHLORHEXIDINE GLUCONATE 15 MILLILITER(S): 213 SOLUTION TOPICAL at 06:38

## 2022-01-01 RX ADMIN — ROBINUL 0.2 MILLIGRAM(S): 0.2 INJECTION INTRAMUSCULAR; INTRAVENOUS at 19:52

## 2022-01-01 RX ADMIN — LIDOCAINE 2 PATCH: 4 CREAM TOPICAL at 04:40

## 2022-01-01 RX ADMIN — Medication 4 MILLILITER(S): at 01:10

## 2022-01-01 RX ADMIN — CHLORHEXIDINE GLUCONATE 1 APPLICATION(S): 213 SOLUTION TOPICAL at 06:03

## 2022-01-01 RX ADMIN — Medication 60 MICROGRAM(S): at 22:21

## 2022-01-01 RX ADMIN — Medication 2.5 MILLIGRAM(S): at 00:57

## 2022-01-01 RX ADMIN — ROBINUL 0.4 MILLIGRAM(S): 0.2 INJECTION INTRAMUSCULAR; INTRAVENOUS at 16:12

## 2022-01-01 RX ADMIN — SODIUM CHLORIDE 120 MILLILITER(S): 9 INJECTION INTRAMUSCULAR; INTRAVENOUS; SUBCUTANEOUS at 07:20

## 2022-01-01 RX ADMIN — Medication 60 MICROGRAM(S): at 22:40

## 2022-01-01 RX ADMIN — Medication 650 MILLIGRAM(S): at 02:17

## 2022-01-01 RX ADMIN — Medication 400 MILLIGRAM(S): at 01:00

## 2022-01-01 RX ADMIN — HEPARIN SODIUM 5000 UNIT(S): 5000 INJECTION INTRAVENOUS; SUBCUTANEOUS at 15:19

## 2022-01-01 RX ADMIN — HYDROMORPHONE HYDROCHLORIDE 0.25 MILLIGRAM(S): 2 INJECTION INTRAMUSCULAR; INTRAVENOUS; SUBCUTANEOUS at 01:00

## 2022-01-01 RX ADMIN — Medication 3 MILLILITER(S): at 21:01

## 2022-01-01 RX ADMIN — ATORVASTATIN CALCIUM 40 MILLIGRAM(S): 80 TABLET, FILM COATED ORAL at 21:19

## 2022-01-01 RX ADMIN — Medication 75 MICROGRAM(S): at 05:16

## 2022-01-01 RX ADMIN — PIPERACILLIN AND TAZOBACTAM 200 GRAM(S): 4; .5 INJECTION, POWDER, LYOPHILIZED, FOR SOLUTION INTRAVENOUS at 01:12

## 2022-01-01 RX ADMIN — Medication 4 MILLILITER(S): at 17:50

## 2022-01-01 RX ADMIN — HYDROMORPHONE HYDROCHLORIDE 0.25 MILLIGRAM(S): 2 INJECTION INTRAMUSCULAR; INTRAVENOUS; SUBCUTANEOUS at 00:57

## 2022-01-01 RX ADMIN — Medication 3 MILLILITER(S): at 21:19

## 2022-01-01 RX ADMIN — PIPERACILLIN AND TAZOBACTAM 3.33 GRAM(S): 4; .5 INJECTION, POWDER, LYOPHILIZED, FOR SOLUTION INTRAVENOUS at 09:09

## 2022-01-01 RX ADMIN — Medication 40 MILLIGRAM(S): at 00:03

## 2022-01-01 RX ADMIN — HEPARIN SODIUM 5000 UNIT(S): 5000 INJECTION INTRAVENOUS; SUBCUTANEOUS at 22:16

## 2022-01-01 RX ADMIN — HYDROMORPHONE HYDROCHLORIDE 1 MILLIGRAM(S): 2 INJECTION INTRAMUSCULAR; INTRAVENOUS; SUBCUTANEOUS at 05:23

## 2022-01-01 RX ADMIN — HYDROMORPHONE HYDROCHLORIDE 2 MILLIGRAM(S): 2 INJECTION INTRAMUSCULAR; INTRAVENOUS; SUBCUTANEOUS at 00:33

## 2022-01-01 RX ADMIN — Medication 3 MILLILITER(S): at 13:53

## 2022-01-01 RX ADMIN — Medication 40 MILLIGRAM(S): at 18:01

## 2022-01-01 RX ADMIN — HEPARIN SODIUM 5000 UNIT(S): 5000 INJECTION INTRAVENOUS; SUBCUTANEOUS at 21:20

## 2022-01-01 RX ADMIN — Medication 400 MILLIGRAM(S): at 13:19

## 2022-01-01 RX ADMIN — SODIUM CHLORIDE 80 MILLILITER(S): 9 INJECTION, SOLUTION INTRAVENOUS at 17:08

## 2022-01-01 RX ADMIN — Medication 975 MILLIGRAM(S): at 01:08

## 2022-01-01 RX ADMIN — Medication 0.5 MILLIGRAM(S): at 21:52

## 2022-01-01 RX ADMIN — Medication 2.5 MILLIGRAM(S): at 06:29

## 2022-01-01 RX ADMIN — Medication 4 MILLILITER(S): at 09:42

## 2022-01-01 RX ADMIN — Medication 3 MILLILITER(S): at 06:29

## 2022-01-01 RX ADMIN — Medication 400 MILLIGRAM(S): at 11:57

## 2022-01-01 RX ADMIN — PANTOPRAZOLE SODIUM 40 MILLIGRAM(S): 20 TABLET, DELAYED RELEASE ORAL at 21:19

## 2022-01-01 RX ADMIN — Medication 4 MILLILITER(S): at 17:38

## 2022-01-01 RX ADMIN — PIPERACILLIN AND TAZOBACTAM 3.33 GRAM(S): 4; .5 INJECTION, POWDER, LYOPHILIZED, FOR SOLUTION INTRAVENOUS at 21:33

## 2022-01-01 RX ADMIN — Medication 5 MILLIGRAM(S): at 05:24

## 2022-01-01 RX ADMIN — CHLORHEXIDINE GLUCONATE 1 APPLICATION(S): 213 SOLUTION TOPICAL at 05:17

## 2022-01-01 RX ADMIN — HYDROMORPHONE HYDROCHLORIDE 2 MILLIGRAM(S): 2 INJECTION INTRAMUSCULAR; INTRAVENOUS; SUBCUTANEOUS at 00:11

## 2022-01-01 RX ADMIN — CEFTRIAXONE 100 MILLIGRAM(S): 500 INJECTION, POWDER, FOR SOLUTION INTRAMUSCULAR; INTRAVENOUS at 18:44

## 2022-01-01 RX ADMIN — Medication 3 MILLILITER(S): at 10:56

## 2022-01-01 RX ADMIN — Medication 5 MILLIGRAM(S): at 22:12

## 2022-01-01 RX ADMIN — Medication 4 MILLILITER(S): at 21:33

## 2022-01-01 RX ADMIN — Medication 3 MILLILITER(S): at 18:19

## 2022-01-01 RX ADMIN — Medication 40 MILLIGRAM(S): at 01:10

## 2022-01-01 RX ADMIN — CHLORHEXIDINE GLUCONATE 1 APPLICATION(S): 213 SOLUTION TOPICAL at 06:39

## 2022-01-01 RX ADMIN — Medication 400 MILLIGRAM(S): at 10:00

## 2022-01-01 RX ADMIN — PIPERACILLIN AND TAZOBACTAM 3.33 GRAM(S): 4; .5 INJECTION, POWDER, LYOPHILIZED, FOR SOLUTION INTRAVENOUS at 09:52

## 2022-01-01 RX ADMIN — PIPERACILLIN AND TAZOBACTAM 3.33 GRAM(S): 4; .5 INJECTION, POWDER, LYOPHILIZED, FOR SOLUTION INTRAVENOUS at 10:04

## 2022-01-01 RX ADMIN — Medication 3 MILLILITER(S): at 17:39

## 2022-01-01 RX ADMIN — Medication 40 MILLIGRAM(S): at 23:33

## 2022-01-01 RX ADMIN — HYDROMORPHONE HYDROCHLORIDE 1 MILLIGRAM(S): 2 INJECTION INTRAMUSCULAR; INTRAVENOUS; SUBCUTANEOUS at 14:33

## 2022-01-01 RX ADMIN — Medication 4 MILLILITER(S): at 13:54

## 2022-01-01 RX ADMIN — SODIUM CHLORIDE 2000 MILLILITER(S): 9 INJECTION, SOLUTION INTRAVENOUS at 17:52

## 2022-01-01 RX ADMIN — Medication 40 MILLIGRAM(S): at 22:29

## 2022-01-01 RX ADMIN — Medication 400 MILLIGRAM(S): at 21:35

## 2022-01-01 RX ADMIN — HEPARIN SODIUM 5000 UNIT(S): 5000 INJECTION INTRAVENOUS; SUBCUTANEOUS at 14:45

## 2022-01-01 RX ADMIN — PANTOPRAZOLE SODIUM 40 MILLIGRAM(S): 20 TABLET, DELAYED RELEASE ORAL at 21:33

## 2022-01-01 RX ADMIN — Medication 4 MILLILITER(S): at 01:02

## 2022-01-01 RX ADMIN — PIPERACILLIN AND TAZOBACTAM 3.33 GRAM(S): 4; .5 INJECTION, POWDER, LYOPHILIZED, FOR SOLUTION INTRAVENOUS at 20:35

## 2022-01-01 RX ADMIN — HEPARIN SODIUM 5000 UNIT(S): 5000 INJECTION INTRAVENOUS; SUBCUTANEOUS at 06:10

## 2022-01-01 RX ADMIN — PANTOPRAZOLE SODIUM 40 MILLIGRAM(S): 20 TABLET, DELAYED RELEASE ORAL at 07:17

## 2022-01-01 RX ADMIN — ATORVASTATIN CALCIUM 40 MILLIGRAM(S): 80 TABLET, FILM COATED ORAL at 21:01

## 2022-01-01 RX ADMIN — Medication 60 MICROGRAM(S): at 21:57

## 2022-01-01 RX ADMIN — PIPERACILLIN AND TAZOBACTAM 200 GRAM(S): 4; .5 INJECTION, POWDER, LYOPHILIZED, FOR SOLUTION INTRAVENOUS at 22:52

## 2022-01-01 RX ADMIN — Medication 4 MILLILITER(S): at 06:30

## 2022-01-09 NOTE — ED ADULT NURSE NOTE - NS ED NURSE DISCH DISPOSITION
Problem: Patient Centered Care  Goal: Patient preferences are identified and integrated in the patient's plan of care  Description: Interventions:  - Provide timely, complete, and accurate information to patient/family  - Incorporate patient and family k Smoking Cessation handout, if applicable  - Encourage broncho-pulmonary hygiene including cough, deep breathe, Incentive Spirometry  - Assess the need for suctioning and perform as needed  - Assess and instruct to report SOB or any respiratory difficulty patient with low platelets)  INTERVENTIONS:  - Avoid intramuscular injections, enemas and rectal medication administration  - Ensure safe mobilization of patient  - Hold pressure on venipuncture sites to achieve adequate hemostasis  - Assess for signs and Progressing Discharged

## 2022-01-24 NOTE — PROCEDURE
[FreeTextEntry1] : Right heel wound: cleaned with peroxide, lightly debrided edges, packed with kerlix and wrapped the foot. Secured with paper tape

## 2022-01-24 NOTE — HISTORY OF PRESENT ILLNESS
[FreeTextEntry1] : 89 y/o F with  PMH of HTN, HLD, COPD, kidney disease, remote hx of sepsis affecting her functional status. She was last evaluated Feb 2021 for a R heel pressure wound. The wound was debrided in the office a few times and the home aid plus VNS completed dressing changes daily. the heel was also kept offloaded and it closed plus a dry callus formed over it. \par \par Today, the pt and her aide report the right heel opened up again. They explain it happened about 2 weeks ago. The aide has been cleaning it daily with soap and water plus completing a saline wet to dry dressing. Only mild serous discharge but no pus or surrounding erythema, bad odor, fever or chills. She stopped using the CAM boot or soft boots.\par \par \par She is wheelchair bound and presents with home health aide, her , Dr Mi stayed home today.

## 2022-01-24 NOTE — ADDENDUM
[FreeTextEntry1] : I, Dr. Segundo Oliva, personally performed the evaluation and management (E/M) services for this established patient who presents today with (a) new problem(s)/exacerbation of (an) existing condition(s).  That E/M includes conducting the examination, assessing all new/exacerbated conditions, and establishing a new plan of care.  Today, my ACP, Lita Yoo NP, was here to observe my evaluation and management services for this new problem/exacerbated condition to be followed going forward.\par

## 2022-01-24 NOTE — PHYSICAL EXAM
[Respiratory Effort] : normal respiratory effort [Normal Rate and Rhythm] : normal rate and rhythm [2+] : left 2+ [1+] : right 1+ [Ankle Swelling (On Exam)] : present [Varicose Veins Of Lower Extremities] : bilaterally [Ankle Swelling On The Right] : mild [Alert] : alert [Oriented to Person] : oriented to person [Oriented to Place] : oriented to place [Oriented to Time] : oriented to time [Calm] : calm [JVD] : no jugular venous distention  [] : not present [Abdomen Tenderness] : ~T ~M No abdominal tenderness [de-identified] : Calm, cooperative, sitting in wheelchair [de-identified] : NC/AT [de-identified] : Supple  [FreeTextEntry1] : RLE with mild swelling, below the knees down to ankles. Non bulging varicose veins throughout. Weak, lightly palpable R PT pulses. Adequate capillary refill in all toes. R heel wound ~ [id number] with great granulating tissue on the base, irregular borders with macerated surrounding skin. No surrounding erythema or foul odor. No signs of infection. Mild to moderate serous drainage.  [de-identified] : FROM but slightly limited throughout and arthritic joints  [de-identified] : see cardiovascular section

## 2022-01-24 NOTE — ASSESSMENT
[Arterial/Venous Disease] : arterial/venous disease [Ulcer Care] : ulcer care [FreeTextEntry1] : 89 y/o F w/ recurrent R heel pressure wound. Granulating tissue int he center with no erythema, or signs of infection. Heal wound cleansed with peroxide. Packed with kerlix and wrapped. Pt and home health aide advised to continue this at home. Also, to continue to relieve pressure off site using CAM boot and pillows. Pt to call with any questions or concerns. Emphasized the importance of elevation. She will F/u with us in 2 weeks or sooner if she develops any concerning symptoms

## 2022-02-18 NOTE — ADDENDUM
[FreeTextEntry1] : I, Dr. Segundo Oliva, personally performed the evaluation and management (E/M) services for this established patient who presents today with (a) new problem(s)/exacerbation of (an) existing condition(s).  That E/M includes conducting the examination, assessing all new/exacerbated conditions, and establishing a new plan of care.  Today, my ACP, Tan Fry, was here to observe my evaluation and management services for this new problem/exacerbated condition to be followed going forward.\par

## 2022-02-18 NOTE — PHYSICAL EXAM
[JVD] : no jugular venous distention  [] : not present [Abdomen Tenderness] : ~T ~M No abdominal tenderness [Purpura] : no purpura  [Petechiae] : no petechiae [Skin Induration] : no induration [de-identified] : Calm, cooperative, sitting in wheelchair [de-identified] : NC/AT [de-identified] : Supple  [FreeTextEntry1] : RLE with mild swelling, below the knees down to ankles, adequate capillary refill in all toes. [de-identified] : FROM but slightly limited throughout and arthritic joints  [de-identified] : R medial heel ulcer ~[id number] w/good granulating tissue at the base, no periwound erythema or malodor. No signs of infection, mild serous drainage.

## 2022-02-18 NOTE — ASSESSMENT
[FreeTextEntry1] : 89yoF w/PMH of HTN, HLD, COPD, kidney disease, followed for a chronic R heel pressure ulcer. The wound was previously debrided in the office and was dressed at home daily by VNS.  Pt/aide state that the wound was steadily healing but reopened in January 2022, seen in our office 1mo prior for evaluation.  Pt states the wound has not made significant progress over the past month, but has been healing slowly w/Medihoney-->dry gauze TIW.  Pt denies fevers/chills/drainage/malodor from the wound.\par \par Wound appears clean/dry and is ronnie slowly.  Wound today cleansed w/peroxide and dressed w/betadine-gauze and kerlix wrap.  Instructed pt to continue current wound care and leg elevation/lateral heel offloading at home and RTO in 1mo for reevaluation.

## 2022-02-18 NOTE — HISTORY OF PRESENT ILLNESS
[FreeTextEntry1] : 89yoF w/PMH of HTN, HLD, COPD, kidney disease, followed for a chronic R heel pressure ulcer. The wound was previously debrided in the office and was dressed at home daily by VNS.  Pt/aide state that the wound was steadily healing but reopened in January 2022, seen in our office 1mo prior for evaluation.  Pt states the wound has not made significant progress over the past month, but has been healing slowly w/Medihoney-->dry gauze TIW.  Pt denies fevers/chills/drainage/malodor from the wound.\par \par She is wheelchair bound and presents with home health aide, her  Dr Mi, again stayed home.

## 2022-02-18 NOTE — PROCEDURE
[FreeTextEntry1] : R heel wound: cleaned with peroxide, packed w/betadine-gauze and wrapped w/kerlix

## 2022-03-11 NOTE — ED PROVIDER NOTE - NS_EDPROVIDERDISPOUSERTYPE_ED_A_ED
Medical/PA/NP Students Attestation (For Medical/PA/NP Student USE Only)... Attending Attestation (For Attendings USE Only)...

## 2022-03-11 NOTE — ED ADULT NURSE REASSESSMENT NOTE - NS ED NURSE REASSESS COMMENT FT1
Pt cleared for d/c by MD MD Escalante. Reports improvement in symptoms. has 24 hour Aid care at home. Accompanied by Aid to home via private car.

## 2022-03-11 NOTE — ED PROVIDER NOTE - CLINICAL SUMMARY MEDICAL DECISION MAKING FREE TEXT BOX
dx UTI/pyelonephritis, acute on chronic kidney disease, hypovolemia.     Plan: fluids, ua, chest xray, cbc, cmp

## 2022-03-11 NOTE — ED PROVIDER NOTE - NSICDXPASTSURGICALHX_GEN_ALL_CORE_FT
PAST SURGICAL HISTORY:  H/O abdominal hysterectomy     History of cholecystectomy     History of hip surgery

## 2022-03-11 NOTE — ED PROVIDER NOTE - ATTENDING CONTRIBUTION TO CARE
agree with medstudent  hypotensive in pmd's office, improved in ED with fluids and abx  found to have uti, labs + UA, Cr baseline  home with abx and close follow up  dw pcp and

## 2022-03-11 NOTE — ED ADULT NURSE NOTE - OBJECTIVE STATEMENT
89 y/o female c/o decrease urine output, pt straight catheterized at home, as per aid in the last 3 days 100ml urine out put daily, pt sent in from PMD office for further evaluation. Pt has been hypotensive , pt denies abdominal pain, n/v/d/fever.

## 2022-03-11 NOTE — ED PROVIDER NOTE - PATIENT PORTAL LINK FT
You can access the FollowMyHealth Patient Portal offered by [facility] by registering at the following website: [email or web address]. By joining [employer]’s FollowMyHealth portal, you will also be able to view your health information using other applications (apps) compatible with our system.

## 2022-03-11 NOTE — ED ADULT NURSE REASSESSMENT NOTE - NS ED NURSE REASSESS COMMENT FT1
Report from AUBREE Gonzalez. Pt admitted to regional floor. Pt Updated as to plan of care. Verbalized understanding.

## 2022-03-11 NOTE — ED PROVIDER NOTE - OBJECTIVE STATEMENT
89 yo f pertinent hx of CKD, HTN presents for hypotension and inability to make urine. Patient was sent in by PCP today due to a BP of 80/50. Pt has hx of HTN controlled with medication. Baseline is 110/60. Patient has difficulty urinating by herself at baseline and needs to be straight cathed. However, she had only put out 50 ml of urine in the past two days. Denies hx of fever, chills, headache. Was sick with coughing and wheezing last week but improving. No sore throat, cp, sob, runny nose, abdominal pain, diarrhea/bloody stool, dysuria, kidney stones. Pt is vaccinated for covid.  Pt denies trauma or cuts on skin. No hx of heart disease, palpitation, blood clots, allergic reactions/ anaphylaxis. Of note, home health aid at bedside noticed that patient has dramatic "jerking/startle" motions in b/l UE over the past 2 days. Denies slurring of speech, changes in consciousness, focal weaknesses (patient cannot ambulate at baseline with weakness in B/L LE).

## 2022-03-11 NOTE — ED PROVIDER NOTE - PHYSICAL EXAMINATION
· CONSTITUTIONAL: elderly female, awake, alert, oriented to person, place, time/situation and in no apparent distress.  · NEUROLOGICAL: Alert and oriented, CN exam wnl, no new focal deficits, motor or sensory deficits. Motor and sensory deficits of LE at baseline   ENMT: Airway patent, Nasal mucosa clear. DRY MM Throat has no vesicles, no oropharyngeal exudates and uvula is midline.  · EYES: Clear bilaterally, pupils equal, round and reactive to light.  · CARDIAC: Normal rate, regular rhythm.  Heart sounds S1, S2, no rubs, murmurs or gallops  · RESPIRATORY: Breath sounds clear and equal bilaterally.  · GASTROINTESTINAL: Abdomen soft, non-tender, no guarding.   : no suprapubic tenderness, no cva tenderness   · SKIN: Skin normal color for race, warm. Some dry, scaly lesions on upper and lower extremities. Chronic venous stasis changes in le   · HEME LYMPH: No adenopathy or splenomegaly. No cervical or inguinal lymphadenopathy.

## 2022-04-06 PROBLEM — L89.619 PRESSURE ULCER OF RIGHT HEEL: Status: ACTIVE | Noted: 2020-06-01

## 2022-04-08 NOTE — HISTORY OF PRESENT ILLNESS
[FreeTextEntry1] : 89yoF w/PMH of HTN, HLD, COPD, kidney disease, followed for a chronic R heel pressure ulcer. The wound had healed but reopened in January 2022. She presets for wound check.  Denies fevers/chills/drainage/malodor from the wound. Pt states her wound looks better, but her right leg is slightly more swollen over the last couple of days. Denies any SOB, palpable cords, trauma. She has been sitting for prolongued periods of time. \par \par \par She is wheelchair bound and presents with home health aide, her  Dr Mi, again stayed home.

## 2022-04-08 NOTE — PHYSICAL EXAM
[] : not present [Purpura] : no purpura  [Petechiae] : no petechiae [Skin Induration] : no induration [de-identified] : Calm, cooperative, sitting in wheelchair [de-identified] : NC/AT [de-identified] : Supple  [FreeTextEntry1] : RLE with mild swelling, below the knees down to foot (primarily localized to the foot). Biphasic DP/PT signals. R medial heel ulcer ~[id number] w/good granulating tissue at the base, no periwound erythema or malodor. No signs of infection, mild serous drainage.  [de-identified] : FROM but slightly limited throughout and arthritic joints  [de-identified] : see cardiovasc section

## 2022-04-08 NOTE — ASSESSMENT
[FreeTextEntry1] : 89yoF who presents for f/u of a chronic R heel pressure ulcer, recurrent.On exam, wound slightly smaller. No signs of infection. RLE with mild swelling, below the knees down to foot (primarily localized to the foot). Biphasic DP/PT signals. \par \par Plan: \par - Elevate leg above the level of the chest to help with the edema\par - Clean w/peroxide and dressed w/betadine-gauze and kerlix wrap daily\par - Continue leg exercises or calf pumps\par - VNS ordered\par - f/u in 4 weeks

## 2022-05-03 NOTE — PHYSICAL THERAPY INITIAL EVALUATION ADULT - DID THE PATIENT HAVE SURGERY?
n/a Spironolactone Pregnancy And Lactation Text: This medication can cause feminization of the male fetus and should be avoided during pregnancy. The active metabolite is also found in breast milk.

## 2022-05-22 NOTE — ED ADULT TRIAGE NOTE - INTERNATIONAL TRAVEL
supportive care, neuro consult, help with all ADL,aspiration precaution supportive care, neuro consult, help with all ADL,aspiration precaution supportive care, neuro consult, help with all ADL,aspiration precaution supportive care, neuro consult, help with all ADL,aspiration precaution supportive care, neuro consult, help with all ADL,aspiration precaution supportive care, neuro consult, help with all ADL,aspiration precaution supportive care, neuro consult, help with all ADL,aspiration precaution supportive care, neuro consult, help with all ADL,aspiration precaution supportive care, neuro consult, help with all ADL,aspiration precaution supportive care, neuro consult, help with all ADL,aspiration precaution supportive care, neuro consult, help with all ADL,aspiration precaution supportive care, neuro consult, help with all ADL,aspiration precaution No

## 2022-05-23 NOTE — H&P ADULT - NSHPLABSRESULTS_GEN_ALL_CORE
LABS:                         12.1   8.65  )-----------( 242      ( 23 May 2022 00:13 )             38.8         136  |  95<L>  |  57<H>  ----------------------------<  157<H>  5.1   |  28  |  3.22<H>    Ca    9.0      23 May 2022 00:13    TPro  7.1  /  Alb  4.0  /  TBili  0.7  /  DBili  x   /  AST  15  /  ALT  9<L>  /  AlkPhos  85      PT/INR - ( 23 May 2022 00:13 )   PT: 11.4 sec;   INR: 0.96          PTT - ( 23 May 2022 00:13 )  PTT:27.9 sec  Urinalysis Basic - ( 23 May 2022 01:29 )    Color: Yellow / Appearance: Clear / S.020 / pH: x  Gluc: x / Ketone: NEGATIVE  / Bili: Negative / Urobili: 1.0 E.U./dL   Blood: x / Protein: 100 mg/dL / Nitrite: POSITIVE   Leuk Esterase: Large / RBC: < 5 /HPF / WBC Many /HPF   Sq Epi: x / Non Sq Epi: 0-5 /HPF / Bacteria: Present /HPF            Lactate, Blood: 3.1 mmol/L ( @ 03:12)  Lactate, Blood: 3.0 mmol/L ( @ 01:52)  Lactate, Blood: 2.1 mmol/L ( @ 00:13)    RADIOLOGY, EKG & ADDITIONAL TESTS:

## 2022-05-23 NOTE — ED PROVIDER NOTE - PROGRESS NOTE DETAILS
Accepted to [facility] ICU consulted (paged starting at 145 but did not answer) and will eval Accepted to [facility].  Pt w diarrheal stool - sent for testing. Dr Garcia updated about pt/admit. ICU consulted (paged starting at 145 but did not answer) and will eval. Pt w elevated lactate that increased after ivf/abx, poss 2/2 cont resp issues - started on bipap and appears more comfortable.  CXR neg on my read, flu/covid neg, + uti. Cr similar to baseline.  BP/hr stable.  Plan repeat lact after bipap.

## 2022-05-23 NOTE — H&P ADULT - PROBLEM SELECTOR PLAN 1
Met 3/4 SIRS criteria on admission (T 101.9, , RR 22) w/ lactate 2.1-->3.0 w/ +coronavirus (not COVID-19). Sepsis suspected 2/2 severe dehydration from poor PO intake i/s/o coronavirus infection along with fluid loss via diarrhea and vomiting. Slightly elevated procal (0.42) though w/o leukocytosis or bandemia and no focal consolidation on CXR. Abdominal pain and diarrhea likely related to underlying coronavirus infection but cannot r/o independent GI infection. UA positive although patient denies any urinary symptoms. S/p 3.1L NS, zosyn 3.375g x1 and tylenol 975mg suppository in the ED.  - holding further abx for now given confirmed viral URI and no clear source of bacterial infection at this time  - f/u BCx  - f/u GI PCR and C. diff  - f/u UA and UCx

## 2022-05-23 NOTE — H&P ADULT - PROBLEM SELECTOR PLAN 9
Has known chronic R heel ulceration. Follows with Dr. Oliva (surgery). Currently wrapped in clean kerlix.  - wound care nurse consult while inpatient

## 2022-05-23 NOTE — PROVIDER CONTACT NOTE (CHANGE IN STATUS NOTIFICATION) - BACKGROUND
85yo F admitted for SOB, diarrhea x3, GARDNER on droplet precaution for coronavirus and influenza A.

## 2022-05-23 NOTE — ED PROVIDER NOTE - OBJECTIVE STATEMENT
89 yo female h/o CKD (Cr 3.92 3/22), HTN, hypothyroid, depression, gout 89 yo female h/o CKD (Cr 3.92 3/22), HTN, hypothyroid, depression, gout biba for ams.  Per hha, pt's  just admitted for flu and returned home today.  Pt w onset of dry cough and 20 episodes of diarrhea tonight - initially nonbloody but w small blood in last few episodes associated w progressive gen weakness.  Pt less interactive now than typical - baseline a and o x 3.  HHA states pt was not having uri sx other than cough, fever, cp, abd pain, n/v, dysuria.  Pt straight caths for urine.  HHA noted pt seemed sob.  HHA pt states pt wheezes w breathing regularly and has inhalers at home. Pt denies ha, cp, sob, abd pain, n/v, + diarrhea.  EMS found pt generally weak and altered, low o2 sat, bp 200/100's, bilat arm drift w/o unilateral weakness or facial droop.

## 2022-05-23 NOTE — CONSULT NOTE ADULT - SUBJECTIVE AND OBJECTIVE BOX
Patient is a 89y old  Female who presents with a chief complaint of severe sepsis (23 May 2022 03:15)        HPI:  89yo F PMH CKD (Cr 3.92 3/22), HTN, hypothyroidism, depression, gout, R heel ulcer presenting for 2-3 days of diarrhea. States she has been having approx 3 watery bms/day, which is not the norm for her. Diarrhea is not associated with abdominal pain and is not bloody. Also endorses a few episodes of emesis, also nonbloody. States she took an antidiarrheal medication with minimal relief. States her  was diagnosed with the flu 2 days ago and that she has had a dry, nonproductive cough for the past 2 days as well. States she has been eating and drinking less while she has felt sick. Denies recent travel or antibiotic use. Denies CP, SOB, wheezing, nausea, fevers, abdominal pain, back pain, changes to urinary habits.    In the ED:  VS: T 101.9, , BP 98/48, RR 22, SpO2 95% on NRB 10L/min  Labs significant for: lactate 2.1 --> 3.0, procal 0.42, K 5.1, Cl 95, BUN/Cr 57/3.22; VBG pH 7.34 / pCO2 57 / pO2 39; UA w/ +bacteria, many wbcs, +nitrites, large leuk esterase, +protein; +Coronavirus, -COVID-19  EKG: sinus tachycardia , 1st degree heart block, TWI in III, ?aVL  CXR: poor inspiratory effort, no consolidation  Interventions: acetaminophen 975mg suppository x1, zosyn 3.375g x1, 2.1L NS, duoneb x1  Consults: ICU (23 May 2022 05:06)      PAST MEDICAL & SURGICAL HISTORY:  Chronic kidney disease, stage IV (severe)      Other specified hypothyroidism      Essential hypertension      Urinary retention      Depression      Gout      H/O abdominal hysterectomy      History of cholecystectomy      History of hip surgery          MEDICATIONS  (STANDING):  acetaminophen   IVPB .. 1000 milliGRAM(s) IV Intermittent once  albuterol/ipratropium for Nebulization. 3 milliLiter(s) Nebulizer once  atorvastatin 40 milliGRAM(s) Oral at bedtime  azithromycin   Tablet 500 milliGRAM(s) Oral daily  budesonide 160 MICROgram(s)/formoterol 4.5 MICROgram(s) Inhaler 1 Puff(s) Inhalation two times a day  cefTRIAXone   IVPB 1000 milliGRAM(s) IV Intermittent every 24 hours  heparin   Injectable 5000 Unit(s) SubCutaneous every 8 hours  lactated ringers. 1000 milliLiter(s) (80 mL/Hr) IV Continuous <Continuous>  levothyroxine 75 MICROGram(s) Oral daily  pantoprazole    Tablet 40 milliGRAM(s) Oral before breakfast    MEDICATIONS  (PRN):      FAMILY HISTORY:  No pertinent family history in first degree relatives      CBC Full  -  ( 23 May 2022 10:52 )  WBC Count : 8.63 K/uL  RBC Count : 3.50 M/uL  Hemoglobin : 10.5 g/dL  Hematocrit : 36.4 %  Platelet Count - Automated : 190 K/uL  Mean Cell Volume : 104.0 fl  Mean Cell Hemoglobin : 30.0 pg  Mean Cell Hemoglobin Concentration : 28.8 gm/dL  Auto Neutrophil # : 5.95 K/uL  Auto Lymphocyte # : 1.46 K/uL  Auto Monocyte # : 1.16 K/uL  Auto Eosinophil # : 0.00 K/uL  Auto Basophil # : 0.02 K/uL  Auto Neutrophil % : 69.0 %  Auto Lymphocyte % : 16.9 %  Auto Monocyte % : 13.4 %  Auto Eosinophil % : 0.0 %  Auto Basophil % : 0.2 %          139  |  102  |  56<H>  ----------------------------<  122<H>  4.6   |  20<L>  |  3.24<H>    Ca    8.0<L>      23 May 2022 10:52  Phos  5.4       Mg     2.2         TPro  6.7  /  Alb  3.7  /  TBili  0.6  /  DBili  x   /  AST  21  /  ALT  10  /  AlkPhos  73  05-      Urinalysis Basic - ( 23 May 2022 01:29 )    Color: Yellow / Appearance: Clear / S.020 / pH: x  Gluc: x / Ketone: NEGATIVE  / Bili: Negative / Urobili: 1.0 E.U./dL   Blood: x / Protein: 100 mg/dL / Nitrite: POSITIVE   Leuk Esterase: Large / RBC: < 5 /HPF / WBC Many /HPF   Sq Epi: x / Non Sq Epi: 0-5 /HPF / Bacteria: Present /HPF          Radiology:    < from: Xray Chest 1 View- PORTABLE-Urgent (22 @ 00:29) >  ACC: [id number] EXAM:  XR CHEST PORTABLE URGENT 1V                          PROCEDURE DATE:  2022          INTERPRETATION:  Clinical history/reason for exam: Sepsis.    Frontal chest/low lung volumes.    COMPARISON: 2022.    Findings/  impression: Heart size within normal limits, thoracic aortic   calcification. Lungs and mediastinum are unremarkable. Stable bony   structures. Right upper quadrant surgical clips.                Vital Signs Last 24 Hrs  T(C): 36.5 (23 May 2022 10:00), Max: 38.8 (22 May 2022 23:56)  T(F): 97.7 (23 May 2022 10:00), Max: 101.9 (22 May 2022 23:56)  HR: 76 (23 May 2022 06:49) (72 - 106)  BP: 94/49 (23 May 2022 06:49) (94/49 - 266/131)  BP(mean): 68 (23 May 2022 06:49) (68 - 68)  RR: 14 (23 May 2022 06:49) (14 - 22)  SpO2: 95% (23 May 2022 06:49) (95% - 98%)        REVIEW OF SYSTEMS:   per HPI          Physical Exam:  on droplet / Infl A isolation, farial 89 yo  woman  lying in semi Garcia's position, awake, alert,  no acute complaints      Neurologic Exam:    Alert and oriented to person, age , place , speech fluent w/o dysarthria , follows commands      Cranial Nerves:     II:                         pupils equal, round and reactive to light, visual fields intact   III/ IV/VI:              extraocular movements intact, neg nystagmus, neg ptosis  V:                        facial sensation intact, V1-3 normal  VII:                      face symmetric, no droop, normal eye closure and smile  VIII:                     hearing intact to finger rub bilaterally  IX and X:             no hoarseness, gag intact, palate/ uvula rise symmetrically  XI:                       SCM/ trapezius strength intact bilateral  XII:                      no tongue deviation    Motor Exam:    Right UE:           no focal weakness,  > 3+/5 throughout                                 Left UE:             no focal weakness,  > 3+/5 throughout      Right LE:            no focal weakness,  > 3+/5 throughout    Left LE:              no focal weakness,  > 3+/5 throughout      Sensation:         intact to light touch x 4 extremities                       DTR:                     biceps/brachioradialis: equal                                              patella/ankle: equal       Gait:  not tested              PM&R Impression:    1) deconditioned  2) no focal weakness    Recommendations/ Plan :    1) Physical / Occupational therapy focusing on therapeutic exercises, bed mobility/transfer out of bed evaluation, progressive ambulation with assistive devices prn.    2) Anticipated Disposition Plan/Recs  :    pending functional progress

## 2022-05-23 NOTE — H&P ADULT - HISTORY OF PRESENT ILLNESS
89yo F PMH CKD (Cr 3.92 3/22), HTN, hypothyroidism, depression, gout, R heel ulcer presenting for 2-3 days of diarrhea. States she has been having approx 3 watery bms/day, which is not the norm for her. Diarrhea is not associated with abdominal pain and is not bloody. States she took an antidiarrheal medication with minimal relief. States her  was diagnosed with the flu 2 days ago and that she has had a dry, nonproductive cough for the past 2 days as well. States she has been eating and drinking less while she has felt sick. Denies recent travel or antibiotic use. Denies CP, SOB, wheezing, N/V, fevers, abdominal pain, back pain, changes to urinary habits.    In the ED:  VS: T 101.9, , BP 98/48, RR 22, SpO2 95% on NRB 10L/min  Labs significant for: lactate 2.1 --> 3.0, procal 0.42, K 5.1, Cl 95, BUN/Cr 57/3.22; VBG pH 7.34 / pCO2 57 / pO2 39; UA w/ +bacteria, many wbcs, +nitrites, large leuk esterase, +protein; +Coronavirus, -COVID-19  EKG: sinus tachycardia , 1st degree heart block, TWI in III, ?aVL  CXR: poor inspiratory effort, no consolidation  Interventions: acetaminophen 975mg suppository x1, zosyn 3.375g x1, 2.1L NS, duoneb x1  Consults: ICU 89yo F PMH CKD (Cr 3.92 3/22), HTN, hypothyroidism, depression, gout, R heel ulcer presenting for 2-3 days of diarrhea. States she has been having approx 3 watery bms/day, which is not the norm for her. Diarrhea is not associated with abdominal pain and is not bloody. Also endorses a few episodes of emesis, also nonbloody. States she took an antidiarrheal medication with minimal relief. States her  was diagnosed with the flu 2 days ago and that she has had a dry, nonproductive cough for the past 2 days as well. States she has been eating and drinking less while she has felt sick. Denies recent travel or antibiotic use. Denies CP, SOB, wheezing, nausea, fevers, abdominal pain, back pain, changes to urinary habits.    In the ED:  VS: T 101.9, , BP 98/48, RR 22, SpO2 95% on NRB 10L/min  Labs significant for: lactate 2.1 --> 3.0, procal 0.42, K 5.1, Cl 95, BUN/Cr 57/3.22; VBG pH 7.34 / pCO2 57 / pO2 39; UA w/ +bacteria, many wbcs, +nitrites, large leuk esterase, +protein; +Coronavirus, -COVID-19  EKG: sinus tachycardia , 1st degree heart block, TWI in III, ?aVL  CXR: poor inspiratory effort, no consolidation  Interventions: acetaminophen 975mg suppository x1, zosyn 3.375g x1, 2.1L NS, duoneb x1  Consults: ICU

## 2022-05-23 NOTE — H&P ADULT - PROBLEM SELECTOR PLAN 7
Known h/o HTN, takes Valsartan (dose unknown, either 40mg or 80mg qd) and Lasix 20mg qd at home.  - holding BP meds i/s/o sepsis and severe hypotension  - official med rec then reintroduce meds when appropriate      #HLD  Known h/o HLD, takes Atorvastatin 10mg qhs at home.  - c/w atorvastatin

## 2022-05-23 NOTE — CONSULT NOTE ADULT - SUBJECTIVE AND OBJECTIVE BOX
HPI:  89F with pmhx of CKD V (baseline Cr 3.5), HTN, depression who is presenting for 2-3 days of diarrhea and URI like symptoms. Per patient has had increased diarrhea for the last couple of days which is different from her norm, she has not felt well which prompted her to come seek care in the ED. She follows with Dr. Garcia from Nephrology. At baseline has Stage V kidney disease that has remained stable for years.     In the ED:  VS: T 101.9, , BP 98/48, RR 22, SpO2 95% on NRB 10L/min  Labs significant for: lactate 2.1 --> 3.0, procal 0.42, K 5.1, Cl 95, BUN/Cr 57/3.22; VBG pH 7.34 / pCO2 57 / pO2 39; UA w/ +bacteria, many wbcs, +nitrites, large leuk esterase, +protein; +Coronavirus, -COVID-19  EKG: sinus tachycardia , 1st degree heart block, TWI in III, ?aVL  CXR: poor inspiratory effort, no consolidation  Interventions: acetaminophen 975mg suppository x1, zosyn 3.375g x1, 2.1L NS, duoneb x1  Consults: ICU (23 May 2022 05:06)      PAST MEDICAL & SURGICAL HISTORY:  Chronic kidney disease, stage IV (severe)      Other specified hypothyroidism      Essential hypertension      Urinary retention      Depression      Gout      H/O abdominal hysterectomy      History of cholecystectomy      History of hip surgery            Allergies:  No Known Allergies      Home Medications:   atorvastatin 40 milliGRAM(s) Oral at bedtime  azithromycin   Tablet 500 milliGRAM(s) Oral daily  budesonide 160 MICROgram(s)/formoterol 4.5 MICROgram(s) Inhaler 1 Puff(s) Inhalation two times a day  cefTRIAXone   IVPB 1000 milliGRAM(s) IV Intermittent every 24 hours  dextrose 5% 1000 milliLiter(s) IV Continuous <Continuous>  heparin   Injectable 5000 Unit(s) SubCutaneous every 8 hours  lactated ringers. 1000 milliLiter(s) IV Continuous <Continuous>  levothyroxine 75 MICROGram(s) Oral daily  pantoprazole    Tablet 40 milliGRAM(s) Oral before breakfast  piperacillin/tazobactam IVPB.. 2.25 Gram(s) IV Intermittent every 6 hours  sodium bicarbonate  Injectable 50 milliEquivalent(s) IV Push once  sodium bicarbonate  Injectable 50 milliEquivalent(s) IV Push once      Hospital Medications:   MEDICATIONS  (STANDING):  atorvastatin 40 milliGRAM(s) Oral at bedtime  azithromycin   Tablet 500 milliGRAM(s) Oral daily  budesonide 160 MICROgram(s)/formoterol 4.5 MICROgram(s) Inhaler 1 Puff(s) Inhalation two times a day  cefTRIAXone   IVPB 1000 milliGRAM(s) IV Intermittent every 24 hours  dextrose 5% 1000 milliLiter(s) (100 mL/Hr) IV Continuous <Continuous>  heparin   Injectable 5000 Unit(s) SubCutaneous every 8 hours  lactated ringers. 1000 milliLiter(s) (80 mL/Hr) IV Continuous <Continuous>  levothyroxine 75 MICROGram(s) Oral daily  pantoprazole    Tablet 40 milliGRAM(s) Oral before breakfast  piperacillin/tazobactam IVPB.. 2.25 Gram(s) IV Intermittent every 6 hours  sodium bicarbonate  Injectable 50 milliEquivalent(s) IV Push once  sodium bicarbonate  Injectable 50 milliEquivalent(s) IV Push once      SOCIAL HISTORY:  Denies ETOh, Smoking,     Family History:  FAMILY HISTORY:  No pertinent family history in first degree relatives      VITALS:  T(F): 98.8 (22 @ 13:56), Max: 101.9 (22 @ 23:56)  HR: 76 (22 @ 06:49)  BP: 94/49 (22 @ 06:49)  RR: 14 (22 @ 06:49)  SpO2: 95% (22 @ 06:49)  Wt(kg): --    Height (cm): 160 ( @ 23:46)  Weight (kg): 87.3 ( @ 00:00)  BMI (kg/m2): 34.1 ( @ 00:00)  BSA (m2): 1.9 ( @ 00:00)  CAPILLARY BLOOD GLUCOSE      POCT Blood Glucose.: 167 mg/dL (22 May 2022 23:42)    Review of Systems:  ROS negative except as per HPI    PHYSICAL EXAM:  GENERAL: Alert, awake, oriented on NC  HEENT: ROSANA, EOMI, neck supple, no JVP  CHEST/LUNG: Rhonchi appreciated b/l lung fields  HEART: Regular rate and rhythm, no murmur, no gallops, no rub   ABDOMEN: Soft, nontender, non distended  : No flank or supra pubic tenderness.  EXTREMITIES: no pedal edema  Neurology: AAOx3, no focal neurological deficit  SKIN: No rash or skin lesion       LABS:      135  |  102  |  59<H>  ----------------------------<  129<H>  4.8   |  19<L>  |  3.18<H>    Ca    7.6<L>      23 May 2022 14:43  Phos  5.4       Mg     2.2         TPro  5.7<L>  /  Alb  3.2<L>  /  TBili  0.4  /  DBili      /  AST  27  /  ALT  9<L>  /  AlkPhos  64      Creatinine Trend: 3.18 <--, 3.24 <--, 3.22 <--                        9.2    8.38  )-----------( 188      ( 23 May 2022 14:43 )             30.7     Urine Studies:  Urinalysis Basic - ( 23 May 2022 01:29 )    Color: Yellow / Appearance: Clear / S.020 / pH:   Gluc:  / Ketone: NEGATIVE  / Bili: Negative / Urobili: 1.0 E.U./dL   Blood:  / Protein: 100 mg/dL / Nitrite: POSITIVE   Leuk Esterase: Large / RBC: < 5 /HPF / WBC Many /HPF   Sq Epi:  / Non Sq Epi: 0-5 /HPF / Bacteria: Present /HPF

## 2022-05-23 NOTE — PROVIDER CONTACT NOTE (OTHER) - SITUATION
Pt tachypneic, tachycardiac, o2 saturation unable to obtain secondary. Pt temp 102.2 Dr Santiago made aware

## 2022-05-23 NOTE — H&P ADULT - PROBLEM SELECTOR PLAN 11
?H/o COPD. Patient denies this or any smoking history although per chart review patient has diagnosis. Per outpatient med review pt appears to be on Symbicort 160mcg-4.5mcg 1 puff BID at home.  - obtain collateral from [facility] and/or PCP  - formal med rec in AM    ?H/o depression though denies this diagnosis. Per chart review patient has been rx'd Duloxetine DR 30mg qd, Desipramine 10mg 4x/day at home.  - obtain collateral from [facility] and/or PCP  - formal med rec

## 2022-05-23 NOTE — H&P ADULT - PROBLEM SELECTOR PLAN 3
Presented w/ tachypnea necessitating NRB then BIPAP for increased work of breathing and lethargy. Suspected to be 2/2 URI as above. On further assessment, able to tolerate NC, currently on 5L satting ~96%. Appears to desat while sleeping, possibly related to undiagnosed RAKAN? thus requiring additional NC. Only requires 3L when awake. Also possible underlying COPD; patient denies this but takes Symbicort 160mcg-4.5mcg at home per chart review. Slightly decreased breath sounds in ADRIANA although no iWOB, cough or wheezing, and CXR w/o signs of consolidation.  - treat coronavirus infection as above  - wean O2 as tolerated

## 2022-05-23 NOTE — CONSULT NOTE ADULT - PROBLEM SELECTOR RECOMMENDATION 12
F: s/p 2.1L NS  E: replete prn  N: DASH diet  GI proph: protonix (omeprazole home med)  DVT proph: hep subq    Dispo: 7LA

## 2022-05-23 NOTE — H&P ADULT - PROBLEM SELECTOR PLAN 5
Presented w/ 2-3d h/o nonbloody diarrhea. Likely 2/2 underlying coronavirus infection although cannot r/o independent GI infection. Pt is diffusely tender to palpation without rebound tenderness on abdominal exam.  - treat coronavirus infection as above  - f/u GI PCR and C. diff

## 2022-05-23 NOTE — PROGRESS NOTE ADULT - PROBLEM SELECTOR PLAN 3
Endorses cough for past 2-3 days and RVP+ for coronavirus and influenza A (not COVID19).  concern for COPD exacerbation     - symptomatic management w/ IVF resuscitation, tylenol, encouraging PO intake and supplemental O2 PRN

## 2022-05-23 NOTE — PROGRESS NOTE ADULT - SUBJECTIVE AND OBJECTIVE BOX
OVERNIGHT EVENTS: Influenza, coronavirus and campylobacter positive.     SUBJECTIVE / INTERVAL HPI: Patient seen and examined at bedside. Pt reports continued abd pain and loose stools, but denies any blood in stools. Pt reports improvement in her breathing. No other health concerns.     MEDICATIONS  (STANDING):  atorvastatin 10 milliGRAM(s) Oral at bedtime  azithromycin   Tablet 500 milliGRAM(s) Oral daily  budesonide 160 MICROgram(s)/formoterol 4.5 MICROgram(s) Inhaler 1 Puff(s) Inhalation two times a day  cefTRIAXone   IVPB 1000 milliGRAM(s) IV Intermittent every 24 hours  heparin   Injectable 5000 Unit(s) SubCutaneous every 8 hours  lactated ringers. 1000 milliLiter(s) (80 mL/Hr) IV Continuous <Continuous>  levothyroxine 75 MICROGram(s) Oral daily  pantoprazole    Tablet 40 milliGRAM(s) Oral before breakfast    MEDICATIONS  (PRN):    Allergies    No Known Allergies    Intolerances        VITAL SIGNS:  Vital Signs Last 24 Hrs  T(C): 36.5 (23 May 2022 10:00), Max: 38.8 (22 May 2022 23:56)  T(F): 97.7 (23 May 2022 10:00), Max: 101.9 (22 May 2022 23:56)  HR: 76 (23 May 2022 06:49) (72 - 106)  BP: 94/49 (23 May 2022 06:49) (94/49 - 266/131)  BP(mean): 68 (23 May 2022 06:49) (68 - 68)  RR: 14 (23 May 2022 06:49) (14 - 22)  SpO2: 95% (23 May 2022 06:49) (95% - 98%)        PHYSICAL EXAM:    General: elderly female lying in bed in no apparent distress, w/ O2 NC in place, breathing without accessory muscle use  HEENT: head NC/AT, nonicteric sclera, EOMI, dry MM  Neck: supple  Heart: RRR, +S1/S2  Lungs: slightly decreased breath sounds in ADRIANA; no wheezes/rales/rhonchi  Abdomen: +BSx4, diffusely TTP, no rebound tenderness; watery brown stool accident during exam  Genitourinary: simpson in place draining dark yellow urine  Extremities: toes are cool and w/ chronic vascular changes; no LE edema, R foot wrapped in kerlix  Vascular: 2+ radial pulses b/l, 1+ DP on LLE, difficulty palpating RLE DP pulse  Neuro: A&Ox3, moves all extremities, follows commands appropriately  Psych: speech non-pressured, thoughts goal-oriented  Skin: dry, intact, no visible jaundice      LABS:                        10.5   8.63  )-----------( 190      ( 23 May 2022 10:52 )             36.4         136  |  95<L>  |  57<H>  ----------------------------<  157<H>  5.1   |  28  |  3.22<H>    Ca    9.0      23 May 2022 00:13    TPro  7.1  /  Alb  4.0  /  TBili  0.7  /  DBili  x   /  AST  15  /  ALT  9<L>  /  AlkPhos  85      PT/INR - ( 23 May 2022 00:13 )   PT: 11.4 sec;   INR: 0.96          PTT - ( 23 May 2022 00:13 )  PTT:27.9 sec  Urinalysis Basic - ( 23 May 2022 01:29 )    Color: Yellow / Appearance: Clear / S.020 / pH: x  Gluc: x / Ketone: NEGATIVE  / Bili: Negative / Urobili: 1.0 E.U./dL   Blood: x / Protein: 100 mg/dL / Nitrite: POSITIVE   Leuk Esterase: Large / RBC: < 5 /HPF / WBC Many /HPF   Sq Epi: x / Non Sq Epi: 0-5 /HPF / Bacteria: Present /HPF      CAPILLARY BLOOD GLUCOSE      POCT Blood Glucose.: 167 mg/dL (22 May 2022 23:42)        GI PCR Panel, Stool (collected 22 @ 06:43)  Source: .Stool Feces  Final Report (22 @ 06:43):    Campylobacter species    DETECTED by PCR    *******Please Note:*******    GI panel PCR evaluates for:    Campylobacter, Plesiomonas shigelloides, Salmonella,    Vibrio, Yersinia enterocolitica, Enteroaggregative    Escherichia coli (EAEC), Enteropathogenic E.coli (EPEC),    Enterotoxigenic E. coli (ETEC) lt/st, Shiga-like    toxin-producing E. coli (STEC) stx1/stx2,    Shigella/ Enteroinvasive E. coli (EIEC), Cryptosporidium,    Cyclospora cayetanensis, Entamoeba histolytica,    Giardia lamblia, Adenovirus F 40/41, Astrovirus,    Norovirus GI/GII, Rotavirus A, Sapovirus        RADIOLOGY & ADDITIONAL TESTS: Reviewed. [facility]  89 yo F with PMHx of HTN, HLD, CKD, hypothyroidism, COPD, Depression, Gout, R heel ulcer presented with non-bloody diarrhea along with abd pain and productive cough since Saturday. Upon arrival, pt febrile, tachycardic with low BPs, needing suppl oxygen NC. Exam remarkable for hypovolemia along with abd exam remarkable for diffuse tenderness to palpation. Pt was found with RVP positive for influenza A and coronavirus, GI PCR positive for campylobacter and UA positive. Pt was started on Abx along with maintenance fluids. On , pt with increasing oxygen requirements, decrease in mentation along with iWOB, placed on HFNC with improvement. Repeat VBG remarkable for acidosis along with decrease in bicarb. Pt started on bicarb gtt. Pt stepped up to MICU for closer monitoring.     OVERNIGHT EVENTS: Influenza, coronavirus and campylobacter positive.     SUBJECTIVE / INTERVAL HPI: Patient seen and examined at bedside. Pt reports continued abd pain and loose stools, but denies any blood in stools. Pt reports improvement in her breathing. No other health concerns.     MEDICATIONS  (STANDING):  atorvastatin 10 milliGRAM(s) Oral at bedtime  azithromycin   Tablet 500 milliGRAM(s) Oral daily  budesonide 160 MICROgram(s)/formoterol 4.5 MICROgram(s) Inhaler 1 Puff(s) Inhalation two times a day  cefTRIAXone   IVPB 1000 milliGRAM(s) IV Intermittent every 24 hours  heparin   Injectable 5000 Unit(s) SubCutaneous every 8 hours  lactated ringers. 1000 milliLiter(s) (80 mL/Hr) IV Continuous <Continuous>  levothyroxine 75 MICROGram(s) Oral daily  pantoprazole    Tablet 40 milliGRAM(s) Oral before breakfast    MEDICATIONS  (PRN):    Allergies    No Known Allergies    Intolerances        VITAL SIGNS:  Vital Signs Last 24 Hrs  T(C): 36.5 (23 May 2022 10:00), Max: 38.8 (22 May 2022 23:56)  T(F): 97.7 (23 May 2022 10:00), Max: 101.9 (22 May 2022 23:56)  HR: 76 (23 May 2022 06:49) (72 - 106)  BP: 94/49 (23 May 2022 06:49) (94/49 - 266/131)  BP(mean): 68 (23 May 2022 06:49) (68 - 68)  RR: 14 (23 May 2022 06:49) (14 - 22)  SpO2: 95% (23 May 2022 06:49) (95% - 98%)        PHYSICAL EXAM:    General: elderly female lying in bed in no apparent distress, w/ O2 NC in place, breathing without accessory muscle use  HEENT: head NC/AT, nonicteric sclera, EOMI, dry MM  Neck: supple  Heart: RRR, +S1/S2  Lungs: slightly decreased breath sounds in ADRIANA; no wheezes/rales/rhonchi  Abdomen: +BSx4, diffusely TTP, no rebound tenderness; watery brown stool accident during exam  Genitourinary: simpson in place draining dark yellow urine  Extremities: toes are cool and w/ chronic vascular changes; no LE edema, R foot wrapped in kerlix  Vascular: 2+ radial pulses b/l, 1+ DP on LLE, difficulty palpating RLE DP pulse  Neuro: A&Ox3, moves all extremities, follows commands appropriately  Psych: speech non-pressured, thoughts goal-oriented  Skin: dry, intact, no visible jaundice      LABS:                        10.5   8.63  )-----------( 190      ( 23 May 2022 10:52 )             36.4     05-23    136  |  95<L>  |  57<H>  ----------------------------<  157<H>  5.1   |  28  |  3.22<H>    Ca    9.0      23 May 2022 00:13    TPro  7.1  /  Alb  4.0  /  TBili  0.7  /  DBili  x   /  AST  15  /  ALT  9<L>  /  AlkPhos  85  05-23    PT/INR - ( 23 May 2022 00:13 )   PT: 11.4 sec;   INR: 0.96          PTT - ( 23 May 2022 00:13 )  PTT:27.9 sec  Urinalysis Basic - ( 23 May 2022 01:29 )    Color: Yellow / Appearance: Clear / S.020 / pH: x  Gluc: x / Ketone: NEGATIVE  / Bili: Negative / Urobili: 1.0 E.U./dL   Blood: x / Protein: 100 mg/dL / Nitrite: POSITIVE   Leuk Esterase: Large / RBC: < 5 /HPF / WBC Many /HPF   Sq Epi: x / Non Sq Epi: 0-5 /HPF / Bacteria: Present /HPF      CAPILLARY BLOOD GLUCOSE      POCT Blood Glucose.: 167 mg/dL (22 May 2022 23:42)        GI PCR Panel, Stool (collected 22 @ 06:43)  Source: .Stool Feces  Final Report (22 @ 06:43):    Campylobacter species    DETECTED by PCR    *******Please Note:*******    GI panel PCR evaluates for:    Campylobacter, Plesiomonas shigelloides, Salmonella,    Vibrio, Yersinia enterocolitica, Enteroaggregative    Escherichia coli (EAEC), Enteropathogenic E.coli (EPEC),    Enterotoxigenic E. coli (ETEC) lt/st, Shiga-like    toxin-producing E. coli (STEC) stx1/stx2,    Shigella/ Enteroinvasive E. coli (EIEC), Cryptosporidium,    Cyclospora cayetanensis, Entamoeba histolytica,    Giardia lamblia, Adenovirus F 40/41, Astrovirus,    Norovirus GI/GII, Rotavirus A, Sapovirus        RADIOLOGY & ADDITIONAL TESTS: Reviewed.

## 2022-05-23 NOTE — PROGRESS NOTE ADULT - ASSESSMENT
89yo F PMH HTN HLD, CKD (baseline Cr ~3), hypothyroidism, COPD, Depression, Gout, R heel ulcer presented 5/23 w/ nonbloody diarrhea, found to be septic and positive for coronavirus and influenza A, found with campylobacter. [place] [address] for increasing O2 requirements and altered mentation. Patient placed on HFNC and transferred to MICU.     Neuro  #Awake, oriented    Pulmonology  #Acute respiratory failure with hypoxia.   Presented w/ tachypnea necessitating NRB then BIPAP for increased work of breathing and lethargy. Suspected to be 2/2 URI as above. On further assessment, able to tolerate NC, currently on 5L satting ~96%. Appears to desat while sleeping, possibly related to undiagnosed RAKAN, thus requiring additional NC. Only requires 3L when awake. Also possible underlying COPD; patient denies this but takes Symbicort 160mcg-4.5mcg at home per chart review. Slightly decreased breath sounds in ADRIANA although no iWOB, cough or wheezing, and CXR w/o signs of consolidation.  - treat coronavirus and influenza A as below  - wean O2 as tolerated.    #Upper respiratory infection.    Endorses cough for past 2-3 days and RVP+ for coronavirus and influenza A (not COVID19).  concern for COPD exacerbation     - symptomatic management w/ IVF resuscitation, tylenol, encouraging PO intake and supplemental O2 PRN.      Problem/Plan - 1:  ·  Problem: Severe sepsis.   ·  Plan: Met 3/4 SIRS criteria on admission (T 101.9, , RR 22) w/ lactate 2.1-->3.0 w/ +coronavirus (not COVID-19). Sepsis suspected 2/2 severe dehydration from poor PO intake i/s/o coronavirus infection along with fluid loss via diarrhea and vomiting. Slightly elevated procal (0.42) though w/o leukocytosis or bandemia and no focal consolidation on CXR. Abdominal pain and diarrhea likely related to underlying coronavirus infection but cannot r/o independent GI infection. UA positive although patient denies any urinary symptoms. S/p 3.1L NS, zosyn 3.375g x1 and tylenol 975mg suppository in the ED.    - f/u BCx  - treat viral respiratory illness and campylobacter as below.    Problem/Plan - 2:  ·  Problem: Acute respiratory failure with hypoxia.   ·  Plan: Presented w/ tachypnea necessitating NRB then BIPAP for increased work of breathing and lethargy. Suspected to be 2/2 URI as above. On further assessment, able to tolerate NC, currently on 5L satting ~96%. Appears to desat while sleeping, possibly related to undiagnosed RAKAN? thus requiring additional NC. Only requires 3L when awake. Also possible underlying COPD; patient denies this but takes Symbicort 160mcg-4.5mcg at home per chart review. Slightly decreased breath sounds in ADRIANA although no iWOB, cough or wheezing, and CXR w/o signs of consolidation.    - treat coronavirus and influenza A as below  - wean O2 as tolerated.    Problem/Plan - 3:  ·  Problem: Upper respiratory infection.   ·  Plan: Endorses cough for past 2-3 days and RVP+ for coronavirus and influenza A (not COVID19).  concern for COPD exacerbation     - symptomatic management w/ IVF resuscitation, tylenol, encouraging PO intake and supplemental O2 PRN.    Problem/Plan - 4:  ·  Problem: Campylobacter diarrhea.   ·  Plan: Presented w/ 2-3d h/o nonbloody diarrhea. Likely 2/2 underlying coronavirus infection although cannot r/o independent GI infection. Pt is diffusely tender to palpation without rebound tenderness on abdominal exam.    - c/w zithromax 500 mg qd x3d  - c/w maintenance IVF LR 80cc/h.    Problem/Plan - 5:  ·  Problem: Acute UTI.   ·  Plan: UA + for nitrites, LE, WBC, blood, bacteria although patient w/o any symptoms and no WBC elevation, and renal epithelial cells in UA as well. Becerra was placed in the ED, draining dark yellow urine.    - c/w CTX 1g qd for 3d.    Problem/Plan - 6:  ·  Problem: HTN (hypertension).   ·  Plan: Known h/o HTN, takes Valsartan 320 mg qd, furosemide 20 mg qd    - holding BP meds i/s/o sepsis and severe hypotension  - official med rec then reintroduce meds when appropriate      #HLD  Known h/o HLD, takes rosuvastatin 10mg qhs at home.  - c/w atorvastatin 40 mg qhs.    Problem/Plan - 7:  ·  Problem: CKD (chronic kidney disease).   ·  Plan: Presented with Cr 3.22 which appears to be close to baseline. Currently producing urine.    - monitor Cr  - avoid nephrotoxic medications.    Problem/Plan - 8:  ·  Problem: Heel ulceration.   ·  Plan: Has known chronic R heel ulceration. Follows with Dr. Oliva (surgery). Currently wrapped in clean kerlix.    - wound care nurse consult while inpatient.    Problem/Plan - 9:  ·  Problem: Hypothyroidism.   ·  Plan: - c/w synthroid 75 mcg qd.    Problem/Plan - 10:  ·  Problem: COPD, severity to be determined.   ·  Plan; H/o COPD. Patient denies this or any smoking history although per chart review patient has diagnosis. Per outpatient med review pt appears to be on Symbicort 160mcg-4.5mcg 1 puff BID at home.    - Monitor  - obtain collateral    H/o depression though denies this diagnosis. Per chart review patient has been rx'd Duloxetine DR 30mg qd, Desipramine 10mg qd     89yo F PMH HTN HLD, CKD (baseline Cr ~3), hypothyroidism, COPD, Depression, Gout, R heel ulcer presented 5/23 w/ nonbloody diarrhea, found to be septic and positive for coronavirus and influenza A, found with campylobacter. Upgraded from [facility] to MICU for increasing O2 requirements and altered mentation. Patient placed on HFNC and transferred to MICU.     Neuro  #Awake, oriented    Pulmonology  #Acute respiratory failure with hypoxia.   Presented w/ tachypnea necessitating NRB then BIPAP for increased work of breathing and lethargy. Suspected to be 2/2 URI as above. On further assessment, able to tolerate NC, currently on 5L satting ~96%. Appears to desat while sleeping, possibly related to undiagnosed RAKAN, thus requiring additional NC. Only requires 3L when awake. Also possible underlying COPD; patient denies this but takes Symbicort 160mcg-4.5mcg at home per chart review. Slightly decreased breath sounds in ADRIANA although no iWOB, cough or wheezing, and CXR w/o signs of consolidation.  - treat coronavirus and influenza A as below  - wean O2 as tolerated.    #Upper respiratory infection with Coronavirus    Endorses cough for past 2-3 days and RVP+ for coronavirus and influenza A (not COVID19).  concern for COPD exacerbation   - symptomatic management w/ IVF resuscitation, tylenol, encouraging PO intake and supplemental O2 PRN.    #COPD, severity to be determined.    H/o COPD. Patient denies this or any smoking history although per chart review patient has diagnosis. Per outpatient med review pt appears to be on Symbicort 160mcg-4.5mcg 1 puff BID at home. Now on HFNC  - Monitor SpO2 and wean as tolerated   - obtain collateral about extent of COPD     Cardiac     #HTN (hypertension).   Known h/o HTN, takes Valsartan 320 mg qd, furosemide 20 mg qd  - holding BP meds i/s/o sepsis and severe hypotension  - official med rec then reintroduce meds when appropriate    #HLD  Known h/o HLD, takes rosuvastatin 10mg qhs at home.  - c/w atorvastatin 40 mg qhs.    GI  #Campylobacter Infection   Presented w/ 2-3d h/o nonbloody diarrhea. Likely 2/2 underlying coronavirus infection although cannot r/o independent GI infection. Pt is diffusely tender to palpation without rebound tenderness on abdominal exam. Campylobacter detected on GI PCR. Pt with low bicarb 2/2 diarrhea. S/p bicarb gtt w/ 3 amps bicarb.   - c/w zithromax 500 mg qd x3d  - c/w maintenance IVF LR -- increased rate to 100cc/hr   - continue to monitor diarrhea and treat symptomatically   - discontinue bicarb gtt at this time and continue on maintenance fluids     ID  #Severe sepsis 2/2    Met 3/4 SIRS criteria on admission (T 101.9, , RR 22) w/ lactate 2.1-->3.0 w/ +coronavirus (not COVID-19). Sepsis suspected 2/2 severe dehydration from poor PO intake i/s/o coronavirus infection along with fluid loss via diarrhea and vomiting. Slightly elevated procal (0.42) though w/o leukocytosis or bandemia and no focal consolidation on CXR. Abdominal pain and diarrhea likely related to underlying coronavirus infection but cannot r/o independent GI infection. UA positive although patient denies any urinary symptoms. S/p 3.1L NS, zosyn 3.375g x1 and tylenol 975mg suppository in the ED. Coronavirus positive. Influenza positive. Campylobacter positive   - treat viral respiratory illness and campylobacter as above  - UTI not likely contributing or acute at this time. Pt with no urinary symptoms. Simpson in place  - monitor strict Is/Os   - maintain LR @ 100cc/hr  - continue to monitor bowel movements 2/2 campylobacter infection         Renal  CKD (chronic kidney disease).    Presented with Cr 3.22 which appears to be close to baseline. Currently producing urine.  - monitor Cr  - avoid nephrotoxic medications  - strict Is/Os with simpson in place    MSK   #Heel ulceration.   Has known chronic R heel ulceration. Follows with Dr. Oliva (surgery). Currently wrapped in clean kerlix.  - wound care nurse consult while inpatient -- f/u recs     Endocrine   # Hypothyroidism.    - c/w synthroid 75 mcg qd.      Dispo: MICU  Code: FULL  Diet: Renal Diet  DVT Proph: Heparin subq

## 2022-05-23 NOTE — H&P ADULT - ASSESSMENT
89yo F PMH HTN HLD, CKD (baseline Cr ~3), hypothyroidism, COPD, Depression, Gout, R heel ulcer presented with 2-3 days of nonbloody diarrhea, found to be severely septic w/ lactate and positive for coronavirus (not COVID19), admitted to [facility] for persistent hypotension likely 2/2 dehydration.

## 2022-05-23 NOTE — CONSULT NOTE ADULT - PROBLEM SELECTOR RECOMMENDATION 11
?hx of COPD as patient denies this or any smoking hx although per chart review patient has diagnosis.  appears to be on symbicort 160-4.5mcg 1 puff bid, per chart review.  - obtain collateral from [facility] and/or PCP  - formal med rec    ?hx of depression as patient denies this diagnosis.  chart review states she's on duloxetine 30mg qd.  - obtain collateral from [facility] and/or PCP  - formal med rec

## 2022-05-23 NOTE — PROGRESS NOTE ADULT - ATTENDING COMMENTS
Pt with multiple episodes of diarrhea and decreased UO. Unable to pass rectal tube with hemorrhoids RN stated. Non anion gap acidosis and now uncompensated resp acidosis leading to decrease in PH and increase WOB. Hiflo started and pt requiring assistance with pulm toilet. Campylobacter diarrhea, UTI, and now Influenza A positivity contributing to fever to 102 this afternoon. Repeat Bc done. Pt given 2 amps NaHCO3 and bicarb gtt started for nonanion gap acidosis secondary to diarrhea. Pts  aware of events and decision to move pt to ICU for closer monitoring and need for intubation.

## 2022-05-23 NOTE — PROGRESS NOTE ADULT - PROBLEM SELECTOR PLAN 4
Presented w/ 2-3d h/o nonbloody diarrhea. Likely 2/2 underlying coronavirus infection although cannot r/o independent GI infection. Pt is diffusely tender to palpation without rebound tenderness on abdominal exam.    - c/w zithromax 500 mg qd x3d  - c/w maintenance IVF LR 80cc/h

## 2022-05-23 NOTE — H&P ADULT - NSHPPHYSICALEXAM_GEN_ALL_CORE
T(C): 37.6 (05-23-22 @ 03:59), Max: 38.8 (05-22-22 @ 23:56)  HR: 72 (05-23-22 @ 05:00) (72 - 106)  BP: 106/71 (05-23-22 @ 05:00) (98/48 - 266/131)  RR: 20 (05-23-22 @ 05:00) (20 - 22)  SpO2: 96% (05-23-22 @ 05:00) (95% - 98%)    General: elderly female lying in bed in no apparent distress, w/ O2 NC in place, breathing without accessory muscle use  HEENT: head NC/AT, nonicteric sclera, EOMI, dry MM  Neck: supple  Heart: RRR, +S1/S2  Lungs: slightly decreased breath sounds in ADRIANA; no wheezes/rales/rhonchi  Abdomen: +BSx4, diffusely TTP, no rebound tenderness; watery brown stool accident during exam  Genitourinary: simpson in place draining dark yellow urine  Extremities: toes are cool and w/ chronic vascular changes; no LE edema, R foot wrapped in kerlix  Vascular: 2+ radial pulses b/l, 1+ DP on LLE, difficulty palpating RLE DP pulse  Neuro: A&Ox3, moves all extremities, follows commands appropriately  Psych: speech non-pressured, thoughts goal-oriented  Skin: dry, intact, no visible jaundice

## 2022-05-23 NOTE — PROVIDER CONTACT NOTE (CHANGE IN STATUS NOTIFICATION) - ACTION/TREATMENT ORDERED:
patient placed on H[place], bolus LR 250x3 administered, IV tylenol administered. patient upgraded to CCU.

## 2022-05-23 NOTE — CONSULT NOTE ADULT - SUBJECTIVE AND OBJECTIVE BOX
Patient is a 89y old Female with PMHx___who presents with a chief complaint of nonbloody diarrhea X2-3 days.  states that she has had ~3 BM/day and normally stools are not watery like this.  she had associated abdominal pain and n/v.  she denies any recent sick contacts, travel, or abx use.  she lives with her  with 24/7 HHA.    HPI:      Allergies    No Known Allergies    Intolerances      Home Medications:  DULoxetine 20 mg oral delayed release capsule: 1 cap(s) orally once a day (10 Jul 2020 15:42)  levothyroxine 75 mcg (0.075 mg) oral tablet: 1 tab(s) orally once a day (10 Jul 2020 15:42)      SOCIAL HX:     Smoking          ETOH/Illicit drugs          Occupation    PAST MEDICAL & SURGICAL HISTORY:  Chronic kidney disease, stage IV (severe)      Other specified hypothyroidism      Essential hypertension      Urinary retention      Depression      Gout      H/O abdominal hysterectomy      History of cholecystectomy      History of hip surgery          FAMILY HISTORY:  No pertinent family history in first degree relatives    :    No known cardiovascular or pulmonary family history     ROS:  See HPI     PHYSICAL EXAM    ICU Vital Signs Last 24 Hrs  T(C): 38.8 (22 May 2022 23:56), Max: 38.8 (22 May 2022 23:56)  T(F): 101.9 (22 May 2022 23:56), Max: 101.9 (22 May 2022 23:56)  HR: 86 (23 May 2022 02:58) (86 - 106)  BP: 116/84 (23 May 2022 02:58) (98/48 - 266/131)  BP(mean): --  ABP: --  ABP(mean): --  RR: 20 (23 May 2022 02:58) (20 - 22)  SpO2: 97% (23 May 2022 02:58) (95% - 98%)      General: WDWN, NAD, resting comfortably in bed  HEENT: NC/AT; anicteric sclera; MMM  Neck: supple  Cardiovascular: +S1/S2; RRR  Respiratory: CTA B/L; no W/R/R  Gastrointestinal: soft, NT/ND; +BSx4  Extremities: WWP; no edema, clubbing or cyanosis  Vascular: 2+ radial, DP/PT pulses B/L  Neurological: AAOx3        LABS:                          12.1   8.65  )-----------( 242      ( 23 May 2022 00:13 )             38.8                                               05    136  |  95<L>  |  57<H>  ----------------------------<  157<H>  5.1   |  28  |  3.22<H>    Ca    9.0      23 May 2022 00:13    TPro  7.1  /  Alb  4.0  /  TBili  0.7  /  DBili  x   /  AST  15  /  ALT  9<L>  /  AlkPhos  85  05-23      PT/INR - ( 23 May 2022 00:13 )   PT: 11.4 sec;   INR: 0.96          PTT - ( 23 May 2022 00:13 )  PTT:27.9 sec                                       Urinalysis Basic - ( 23 May 2022 01:29 )    Color: Yellow / Appearance: Clear / S.020 / pH: x  Gluc: x / Ketone: NEGATIVE  / Bili: Negative / Urobili: 1.0 E.U./dL   Blood: x / Protein: 100 mg/dL / Nitrite: POSITIVE   Leuk Esterase: Large / RBC: < 5 /HPF / WBC Many /HPF   Sq Epi: x / Non Sq Epi: 0-5 /HPF / Bacteria: Present /HPF                                                  LIVER FUNCTIONS - ( 23 May 2022 00:13 )  Alb: 4.0 g/dL / Pro: 7.1 g/dL / ALK PHOS: 85 U/L / ALT: 9 U/L / AST: 15 U/L / GGT: x                                                                                                                                           CXR:    ECHO:    MEDICATIONS  (STANDING):    MEDICATIONS  (PRN):         Patient is a 89y old Female with PMHx HTN, HLD, CKD (baseline Cr ~3), hypothyroidism, COPD, Depression, Gout, R heel ulcer who presents with a chief complaint of nonbloody diarrhea X2-3 days.  states that she has had ~3 BM/day and normally stools are not watery like this.  she had associated abdominal pain and nonbloody n/v (~2-3 episodes).  she took an anti-diarrheal pill with minimal relief.  she denies any fevers.  she states that her  was diagnosed with the flu 2 days ago and she has developed a cough over the past couple of days.       and denies any recent sick contacts, travel, or abx use.  she lives with her  with  [facility].    HPI:      Allergies    No Known Allergies    Intolerances      Home Medications:  DULoxetine 20 mg oral delayed release capsule: 1 cap(s) orally once a day (10 Jul 2020 15:42)  levothyroxine 75 mcg (0.075 mg) oral tablet: 1 tab(s) orally once a day (10 Jul 2020 15:42)      SOCIAL HX:     Smoking          ETOH/Illicit drugs          Occupation    PAST MEDICAL & SURGICAL HISTORY:  Chronic kidney disease, stage IV (severe)      Other specified hypothyroidism      Essential hypertension      Urinary retention      Depression      Gout      H/O abdominal hysterectomy      History of cholecystectomy      History of hip surgery          FAMILY HISTORY:  No pertinent family history in first degree relatives    :    No known cardiovascular or pulmonary family history     ROS:  See HPI     PHYSICAL EXAM    ICU Vital Signs Last 24 Hrs  T(C): 38.8 (22 May 2022 23:56), Max: 38.8 (22 May 2022 23:56)  T(F): 101.9 (22 May 2022 23:56), Max: 101.9 (22 May 2022 23:56)  HR: 86 (23 May 2022 02:58) (86 - 106)  BP: 116/84 (23 May 2022 02:58) (98/48 - 266/131)  BP(mean): --  ABP: --  ABP(mean): --  RR: 20 (23 May 2022 02:58) (20 - 22)  SpO2: 97% (23 May 2022 02:58) (95% - 98%)      General: WDWN, NAD, resting comfortably in bed  HEENT: NC/AT; anicteric sclera; MMM  Neck: supple  Cardiovascular: +S1/S2; RRR  Respiratory: CTA B/L; no W/R/R  Gastrointestinal: soft, NT/ND; +BSx4  Extremities: WWP; no edema, clubbing or cyanosis  Vascular: 2+ radial, DP/PT pulses B/L  Neurological: AAOx3        LABS:                          12.1   8.65  )-----------( 242      ( 23 May 2022 00:13 )             38.8                                               05-    136  |  95<L>  |  57<H>  ----------------------------<  157<H>  5.1   |  28  |  3.22<H>    Ca    9.0      23 May 2022 00:13    TPro  7.1  /  Alb  4.0  /  TBili  0.7  /  DBili  x   /  AST  15  /  ALT  9<L>  /  AlkPhos  85  05-23      PT/INR - ( 23 May 2022 00:13 )   PT: 11.4 sec;   INR: 0.96          PTT - ( 23 May 2022 00:13 )  PTT:27.9 sec                                       Urinalysis Basic - ( 23 May 2022 01:29 )    Color: Yellow / Appearance: Clear / S.020 / pH: x  Gluc: x / Ketone: NEGATIVE  / Bili: Negative / Urobili: 1.0 E.U./dL   Blood: x / Protein: 100 mg/dL / Nitrite: POSITIVE   Leuk Esterase: Large / RBC: < 5 /HPF / WBC Many /HPF   Sq Epi: x / Non Sq Epi: 0-5 /HPF / Bacteria: Present /HPF                                                  LIVER FUNCTIONS - ( 23 May 2022 00:13 )  Alb: 4.0 g/dL / Pro: 7.1 g/dL / ALK PHOS: 85 U/L / ALT: 9 U/L / AST: 15 U/L / GGT: x                                                                                                                                           CXR:    ECHO:    MEDICATIONS  (STANDING):    MEDICATIONS  (PRN):         Patient is a 89y old Female with PMHx HTN, HLD, CKD (baseline Cr ~3), hypothyroidism, COPD, Depression, Gout, R heel ulcer who presents with a chief complaint of nonbloody diarrhea X2-3 days.  states that she has had ~3 BM/day and normally stools are not watery like this.  she had associated abdominal pain and nonbloody n/v (~2-3 episodes).  she took an anti-diarrheal pill with minimal relief.  she denies any fevers.  she states that her  was diagnosed with the flu 2 days ago and she has developed a cough over the past couple of days but denies any sob or wheezing.  she denies any urinary symptoms or back pain.  she has not been eating or drinking much since feeling sick.  she denies any travel or antibiotic use.  she lives with her  and they have a 24/7 HHA.      Allergies    No Known Allergies    Intolerances      Home Medications:  Per chart review:  lipitor 10mg qd, symbicort 160-4.5mcg 1 puff bid, desipramine 10mg qid, duloxetine 30mg qd, estradiol 0.5mg qd, febuxostat 40mg qd, lasix 20mg qd, pregabalin 75mg tid, silver sulfadiazine, omeprazole 40mg qd, myrbetriq 50mg qd, valsartan 40mg vs 80mg?, levothyroxine 75mcg qd, miralax    SOCIAL HX:     Smoking-never  ETOH/Illicit drugs-never          Occupation-retired clothing store owner    PAST MEDICAL & SURGICAL HISTORY:  Chronic kidney disease, stage IV (severe)      Other specified hypothyroidism      Essential hypertension      Urinary retention      Depression      Gout      H/O abdominal hysterectomy      History of cholecystectomy      History of hip surgery          FAMILY HISTORY:  No pertinent family history in first degree relatives    :    No known cardiovascular or pulmonary family history     ROS:  See HPI     PHYSICAL EXAM    ICU Vital Signs Last 24 Hrs  T(C): 38.8 (22 May 2022 23:56), Max: 38.8 (22 May 2022 23:56)  T(F): 101.9 (22 May 2022 23:56), Max: 101.9 (22 May 2022 23:56)  HR: 86 (23 May 2022 02:58) (86 - 106)  BP: 116/84 (23 May 2022 02:58) (98/48 - 266/131)  BP(mean): --  ABP: --  ABP(mean): --  RR: 20 (23 May 2022 02:58) (20 - 22)  SpO2: 97% (23 May 2022 02:58) (95% - 98%)      General: WDWN, NAD, resting comfortably in bed, elderly  HEENT: NC/AT; anicteric sclera; dry mucous membranes  Neck: supple  Cardiovascular: +S1/S2; RRR  Respiratory: slightly decreased breath sounds in ADRIANA; no W/R/R  Gastrointestinal: diffuse TTP, no rebound tenderness; +BSx4  Genitourinary: simpson in place draining dark yellow urine  Extremities: cool toes with chronic vascular changes; no edema, R foot wrapped in kerlix  Vascular: 2+ radial, 1+ DP pulses L, difficulty palpating DP pulse on R  Neurological: AAOx3, moves all extremities, follows commands appropriately        LABS:                          12.1   8.65  )-----------( 242      ( 23 May 2022 00:13 )             38.8                                               05-23    136  |  95<L>  |  57<H>  ----------------------------<  157<H>  5.1   |  28  |  3.22<H>    Ca    9.0      23 May 2022 00:13    TPro  7.1  /  Alb  4.0  /  TBili  0.7  /  DBili  x   /  AST  15  /  ALT  9<L>  /  AlkPhos  85  05-23      PT/INR - ( 23 May 2022 00:13 )   PT: 11.4 sec;   INR: 0.96          PTT - ( 23 May 2022 00:13 )  PTT:27.9 sec                                       Urinalysis Basic - ( 23 May 2022 01:29 )    Color: Yellow / Appearance: Clear / S.020 / pH: x  Gluc: x / Ketone: NEGATIVE  / Bili: Negative / Urobili: 1.0 E.U./dL   Blood: x / Protein: 100 mg/dL / Nitrite: POSITIVE   Leuk Esterase: Large / RBC: < 5 /HPF / WBC Many /HPF   Sq Epi: x / Non Sq Epi: 0-5 /HPF / Bacteria: Present /HPF                                                  LIVER FUNCTIONS - ( 23 May 2022 00:13 )  Alb: 4.0 g/dL / Pro: 7.1 g/dL / ALK PHOS: 85 U/L / ALT: 9 U/L / AST: 15 U/L / GGT: x                                                                                                                                           CXR:    ECHO:    MEDICATIONS  (STANDING):    MEDICATIONS  (PRN):         Patient is a 89y old Female with PMHx HTN, HLD, CKD (baseline Cr ~3), hypothyroidism, COPD, Depression, Gout, R heel ulcer who presents with a chief complaint of nonbloody diarrhea X2-3 days.  states that she has had ~3 BM/day and normally stools are not watery like this.  she had associated abdominal pain and nonbloody n/v (~2-3 episodes).  she took an anti-diarrheal pill with minimal relief.  she denies any fevers.  she states that her  was diagnosed with the flu 2 days ago and she has developed a cough over the past couple of days but denies any sob or wheezing.  she denies any urinary symptoms or back pain.  she has not been eating or drinking much since feeling sick.  she denies any travel or antibiotic use.  she lives with her  and they have a 24/7 HHA.      In the ED:  Initial vital signs: T: 101.9 F, HR: 98, BP: 98/48, R: 22, SpO2: 95% on NRB-->BIPAP  ED course:   Labs: significant for lactate 2.1-->3.0 (after 2.1L NS), procal 0.42, BUN/Cr 57/3.22, UA + for nitrites, LE, WBC, blood, bacteria, RVP + for coronavirus (not COVID19)  Imaging:  CXR: poor inspiratory effort, no consolidation  EKG: Sinus tach with 1st degree AVB  Medications: 2.1L NS, duoneb X1, zosyn 3.385mg, tylenol 975mg    Allergies    No Known Allergies    Intolerances      Home Medications:  Per chart review:  lipitor 10mg qd, symbicort 160-4.5mcg 1 puff bid, desipramine 10mg qid, duloxetine 30mg qd, estradiol 0.5mg qd, febuxostat 40mg qd, lasix 20mg qd, pregabalin 75mg tid, silver sulfadiazine, omeprazole 40mg qd, myrbetriq 50mg qd, valsartan 40mg vs 80mg?, levothyroxine 75mcg qd, miralax    SOCIAL HX:     Smoking-never  ETOH/Illicit drugs-never          Occupation-retired clothing store owner    PAST MEDICAL & SURGICAL HISTORY:  Chronic kidney disease, stage IV (severe)      Other specified hypothyroidism      Essential hypertension      Urinary retention      Depression      Gout      H/O abdominal hysterectomy      History of cholecystectomy      History of hip surgery          FAMILY HISTORY:  No pertinent family history in first degree relatives    :    No known cardiovascular or pulmonary family history     ROS:  See HPI     PHYSICAL EXAM    ICU Vital Signs Last 24 Hrs  T(C): 38.8 (22 May 2022 23:56), Max: 38.8 (22 May 2022 23:56)  T(F): 101.9 (22 May 2022 23:56), Max: 101.9 (22 May 2022 23:56)  HR: 86 (23 May 2022 02:58) (86 - 106)  BP: 116/84 (23 May 2022 02:58) (98/48 - 266/131)  BP(mean): --  ABP: --  ABP(mean): --  RR: 20 (23 May 2022 02:58) (20 - 22)  SpO2: 97% (23 May 2022 02:58) (95% - 98%)      General: WDWN, NAD, resting comfortably in bed, elderly  HEENT: NC/AT; anicteric sclera; dry mucous membranes  Neck: supple  Cardiovascular: +S1/S2; RRR  Respiratory: slightly decreased breath sounds in ADRIANA; no W/R/R  Gastrointestinal: diffuse TTP, no rebound tenderness; +BSx4, watery brown stool accident during exam  Genitourinary: simpson in place draining dark yellow urine  Extremities: cool toes with chronic vascular changes; no edema, R foot wrapped in kerlix  Vascular: 2+ radial, 1+ DP pulses L, difficulty palpating DP pulse on R  Neurological: AAOx3, moves all extremities, follows commands appropriately        LABS:                          12.1   8.65  )-----------( 242      ( 23 May 2022 00:13 )             38.8                                               05-23    136  |  95<L>  |  57<H>  ----------------------------<  157<H>  5.1   |  28  |  3.22<H>    Ca    9.0      23 May 2022 00:13    TPro  7.1  /  Alb  4.0  /  TBili  0.7  /  DBili  x   /  AST  15  /  ALT  9<L>  /  AlkPhos  85  05-23      PT/INR - ( 23 May 2022 00:13 )   PT: 11.4 sec;   INR: 0.96          PTT - ( 23 May 2022 00:13 )  PTT:27.9 sec                                       Urinalysis Basic - ( 23 May 2022 01:29 )    Color: Yellow / Appearance: Clear / S.020 / pH: x  Gluc: x / Ketone: NEGATIVE  / Bili: Negative / Urobili: 1.0 E.U./dL   Blood: x / Protein: 100 mg/dL / Nitrite: POSITIVE   Leuk Esterase: Large / RBC: < 5 /HPF / WBC Many /HPF   Sq Epi: x / Non Sq Epi: 0-5 /HPF / Bacteria: Present /HPF                                                  LIVER FUNCTIONS - ( 23 May 2022 00:13 )  Alb: 4.0 g/dL / Pro: 7.1 g/dL / ALK PHOS: 85 U/L / ALT: 9 U/L / AST: 15 U/L / GGT: x                                                                                                                                           CXR:    ECHO:    MEDICATIONS  (STANDING):    MEDICATIONS  (PRN):         Patient is a 89y old Female with PMHx HTN, HLD, CKD (baseline Cr ~3), hypothyroidism, COPD, Depression, Gout, R heel ulcer who presents with a chief complaint of nonbloody diarrhea X2-3 days.  states that she has had ~3 BM/day and normally stools are not watery like this.  she had associated abdominal pain and nonbloody n/v (~2-3 episodes).  she took an anti-diarrheal pill with minimal relief.  she denies any fevers.  she states that her  was diagnosed with the flu 2 days ago and she has developed a cough over the past couple of days but denies any sob or wheezing.  she denies any urinary symptoms or back pain.  she has not been eating or drinking much since feeling sick.  she denies any travel or antibiotic use.  she lives with her  and they have a 24/7 HHA.      In the ED:  Initial vital signs: T: 101.9 F, HR: 98, BP: 98/48, R: 22, SpO2: 95% on NRB-->BIPAP  ED course:   Labs: significant for lactate 2.1-->3.0 (after 2.1L NS), procal 0.42, BUN/Cr 57/3.22, UA + for nitrites, LE, WBC, blood, bacteria, RVP + for coronavirus (not COVID19)  Imaging:  CXR: poor inspiratory effort, no consolidation  EKG: Sinus tach with 1st degree AVB  Medications: 2.1L NS, duoneb X1, zosyn 3.385mg, tylenol 975mg    Allergies    No Known Allergies    Intolerances      Home Medications:  Per chart review:  lipitor 10mg qd, symbicort 160-4.5mcg 1 puff bid, desipramine 10mg qid, duloxetine 30mg qd, estradiol 0.5mg qd, febuxostat 40mg qd, lasix 20mg qd, pregabalin 75mg tid, silver sulfadiazine, omeprazole 40mg qd, myrbetriq 50mg qd, valsartan 40mg vs 80mg?, levothyroxine 75mcg qd, miralax    SOCIAL HX:     Smoking-never  ETOH/Illicit drugs-never          Occupation-retired clothing store owner  Living: with  with 24h HHA (Yuliana), wheel chair bound? per o/p surgery chart review    PAST MEDICAL & SURGICAL HISTORY:  Chronic kidney disease, stage IV (severe)      Other specified hypothyroidism      Essential hypertension      Urinary retention      Depression      Gout      H/O abdominal hysterectomy      History of cholecystectomy      History of hip surgery          FAMILY HISTORY:  No pertinent family history in first degree relatives    :    No known cardiovascular or pulmonary family history     ROS:  See HPI     PHYSICAL EXAM    ICU Vital Signs Last 24 Hrs  T(C): 38.8 (22 May 2022 23:56), Max: 38.8 (22 May 2022 23:56)  T(F): 101.9 (22 May 2022 23:56), Max: 101.9 (22 May 2022 23:56)  HR: 86 (23 May 2022 02:58) (86 - 106)  BP: 116/84 (23 May 2022 02:58) (98/48 - 266/131)  BP(mean): --  ABP: --  ABP(mean): --  RR: 20 (23 May 2022 02:58) (20 - 22)  SpO2: 97% (23 May 2022 02:58) (95% - 98%)      General: WDWN, NAD, resting comfortably in bed, elderly  HEENT: NC/AT; anicteric sclera; dry mucous membranes  Neck: supple  Cardiovascular: +S1/S2; RRR  Respiratory: slightly decreased breath sounds in ADRIANA; no W/R/R  Gastrointestinal: diffuse TTP, no rebound tenderness; +BSx4, watery brown stool accident during exam  Genitourinary: simpson in place draining dark yellow urine  Extremities: cool toes with chronic vascular changes; no edema, R foot wrapped in kerlix  Vascular: 2+ radial, 1+ DP pulses L, difficulty palpating DP pulse on R  Neurological: AAOx3, moves all extremities, follows commands appropriately        LABS:                          12.1   8.65  )-----------( 242      ( 23 May 2022 00:13 )             38.8                                               05-23    136  |  95<L>  |  57<H>  ----------------------------<  157<H>  5.1   |  28  |  3.22<H>    Ca    9.0      23 May 2022 00:13    TPro  7.1  /  Alb  4.0  /  TBili  0.7  /  DBili  x   /  AST  15  /  ALT  9<L>  /  AlkPhos  85  05-23      PT/INR - ( 23 May 2022 00:13 )   PT: 11.4 sec;   INR: 0.96          PTT - ( 23 May 2022 00:13 )  PTT:27.9 sec                                       Urinalysis Basic - ( 23 May 2022 01:29 )    Color: Yellow / Appearance: Clear / S.020 / pH: x  Gluc: x / Ketone: NEGATIVE  / Bili: Negative / Urobili: 1.0 E.U./dL   Blood: x / Protein: 100 mg/dL / Nitrite: POSITIVE   Leuk Esterase: Large / RBC: < 5 /HPF / WBC Many /HPF   Sq Epi: x / Non Sq Epi: 0-5 /HPF / Bacteria: Present /HPF                                                  LIVER FUNCTIONS - ( 23 May 2022 00:13 )  Alb: 4.0 g/dL / Pro: 7.1 g/dL / ALK PHOS: 85 U/L / ALT: 9 U/L / AST: 15 U/L / GGT: x                                                                                                                                           CXR:    ECHO:    MEDICATIONS  (STANDING):    MEDICATIONS  (PRN):

## 2022-05-23 NOTE — ED ADULT NURSE NOTE - OBJECTIVE STATEMENT
pt. presents with c/o alteration in mental status at night about 2 hours pta. as per hha, pt's  had flu and was treated and discharged today, pt. had cough, diarrhea and weakness all day. pt. is responsive to verbal stimuli on arrival, on non-rebreather, tachypneic, tachycardic, fever rectally. pt. presents with c/o alteration in mental status at night about 2 hours pta. as per hha, pt's  had flu and was treated and discharged today, pt. had cough, diarrhea, nausea, abdominal pain and weakness for a couple of days. on arrival, pt. is responsive to verbal stimuli, on non-rebreather, tachypneic, tachycardic, fever rectally. pt. denies any pain.

## 2022-05-23 NOTE — H&P ADULT - PROBLEM SELECTOR PLAN 2
Endorses cough for past 2-3 days and RVP+ for coronavirus (not COVID19).  - symptomatic management w/ IVF resuscitation, tylenol, encouraging PO intake and supplemental O2 PRN

## 2022-05-23 NOTE — H&P ADULT - PROBLEM SELECTOR PLAN 8
Presented with Cr 3.22 which appears to be close to baseline. Currently producing urine.  - monitor Cr  - avoid nephrotoxic medications

## 2022-05-23 NOTE — CONSULT NOTE ADULT - PROBLEM SELECTOR RECOMMENDATION 7
Per chart review, hx of HTN on valsartan 40mg vs. 80mg qd and lasix 20mg qd.  - obtain collateral from HHA who controls patients meds Per chart review, hx of HTN on valsartan 40mg vs. 80mg qd and lasix 20mg qd.  - obtain collateral from HHA who controls patients meds  - hold while inpatient i/s/o sepsis and severe hypotension    #HLD  Hx of HLD on lipitor 10mg qhs.  - c/w home med

## 2022-05-23 NOTE — ED PROVIDER NOTE - PHYSICAL EXAMINATION
VITAL SIGNS: I have reviewed nursing notes and confirm.  CONSTITUTIONAL: Well-developed; well-nourished; in + resp distress. ill appearing  SKIN:  warm and dry, no acute rash.   HEAD:  normocephalic, atraumatic.  EYES: PERRL, EOM intact; conjunctiva and sclera clear.  ENT: No nasal discharge; airway clear.   NECK: Supple; non tender.  CARD: S1, S2 normal; no murmurs, gallops, or rubs. Regular rate and rhythm.   RESP:  scattered bilat wheezes, no rales or rhonchi. + tachypnea and increased wob  ABD: Normal bowel sounds; soft; non-distended; non-tender; no guarding/ rebound.  MSK: Normal ROM. No clubbing, cyanosis, + trace edema bilat. no ttp bilat le  NEURO: somnolent, oriented, wakes easily to touch/voice, no gross motor/sens deficitis  PSYCH: Cooperative, mood and affect appropriate.

## 2022-05-23 NOTE — ED ADULT NURSE REASSESSMENT NOTE - NS ED NURSE REASSESS COMMENT FT1
Becerra inserted 50 cc of orange-yellow urine drained, pt. was put on bi-pap, O2 sat 98%, pt. is seeping, responds to verbal stimuli.

## 2022-05-23 NOTE — PATIENT PROFILE ADULT - FUNCTIONAL ASSESSMENT - DAILY ACTIVITY ASSESSMENT TYPE
Neuro: A&Ox4.   Cardiac:  VSS. Afebrile.   Respiratory: Sating WNL on RA.  GI/: Adequate urine output.   Diet/appetite: Regular diet ordered.  Activity:  Up independently  Pain: Tylenol given X1 for left jaw pain.  Skin: No deficits noted.  LDA's: PIV with NS  at 125 ml/hr. DL internal jugular for apheresis.    Plan:  Possible dental surgery  this week. Continue with POC. Notify primary team with changes.                             Admission

## 2022-05-23 NOTE — ED PROVIDER NOTE - ADMIT DISPOSITION PRESENT ON ADMISSION SEPSIS
PT STS IF SHE IS TO BE ADMITTED THEN SHE WANTS TO GO TO [facility].      Jeb Spurling, RN  06/15/19 [id number] Yes

## 2022-05-23 NOTE — PROGRESS NOTE ADULT - PROBLEM SELECTOR PLAN 2
Presented w/ tachypnea necessitating NRB then BIPAP for increased work of breathing and lethargy. Suspected to be 2/2 URI as above. On further assessment, able to tolerate NC, currently on 5L satting ~96%. Appears to desat while sleeping, possibly related to undiagnosed RAKAN? thus requiring additional NC. Only requires 3L when awake. Also possible underlying COPD; patient denies this but takes Symbicort 160mcg-4.5mcg at home per chart review. Slightly decreased breath sounds in ADRIANA although no iWOB, cough or wheezing, and CXR w/o signs of consolidation.    - treat coronavirus and influenza A as below  - wean O2 as tolerated

## 2022-05-23 NOTE — H&P ADULT - PROBLEM SELECTOR PLAN 4
Presented with BP 98/48 with improvement to 116/84 s/p 3.1L NS. On further assessment SBP 90s-->80s w/o changes in mentation or reflex tachycardia. C/f severe dehydration as mentioned above. Patient was very dry on exam and endorsing thirst.  - monitor BP closely and resuscitate PRN  - investigate causes of diarrhea as above w/ GI PCR and C. diff

## 2022-05-23 NOTE — H&P ADULT - PROBLEM SELECTOR PLAN 12
F: s/p 3.1 L NS in the ED  E: replete PRN  N: dash/tlc  GI ppx: protonix 40mg qd  DVT ppx: heparin 5000u q8h subq  Code status:  Dispo: 7LA (physically on 5LA)

## 2022-05-23 NOTE — CONSULT NOTE ADULT - PROBLEM SELECTOR RECOMMENDATION 6
UA + for nitrites, LE, WBC, blood, bacteria although patient w/o any symptoms and no WBC elevation.  simpson with dark yellow urine.  - holding off on abx at this time  - continue to monitor

## 2022-05-23 NOTE — ED PROVIDER NOTE - CLINICAL SUMMARY MEDICAL DECISION MAKING FREE TEXT BOX
Pt biba for ams, hha reports 1 d cough and diarrhea,  just dc'd home after admit for flu.  EMS bp and bp on arrival 230/160 - however repeat w manual cuff 98/60 - I believe lower bp but will monitor.  I suspect pt w flu since  w same but will assess for other etiology.  Pt given empiric abx.  Sepsis code initiated.  Plan labs, cx's, poss tamiflu if flu pos, cardiac monitor; likely admit. Pt biba for ams, hha reports 1 d cough and diarrhea,  just dc'd home after admit for flu.  EMS bp and bp on arrival 230/160 - however repeat w manual cuff 98/60 - I believe lower bp is accurate rather then initial bp, but will monitor.  Pt denies ha, no neuro deficits except somnolence - no sig concern for cva/ich..  Pt w fever . I believe pt w ams (mild somnolence o/w a and o) 2/2 sepsis.  I suspect pt w flu since  w same but will assess for other etiology.  Pt also w diarrhea w/o abd pain - ? gi etiology.  Pt given empiric abx.  Sepsis code initiated.  Plan labs, cx's, poss tamiflu if flu pos, stool studies if able to give sample, cardiac monitor; plan for admit.  Pt full code.

## 2022-05-23 NOTE — PROGRESS NOTE ADULT - PROBLEM SELECTOR PLAN 1
Met 3/4 SIRS criteria on admission (T 101.9, , RR 22) w/ lactate 2.1-->3.0 w/ +coronavirus (not COVID-19). Sepsis suspected 2/2 severe dehydration from poor PO intake i/s/o coronavirus infection along with fluid loss via diarrhea and vomiting. Slightly elevated procal (0.42) though w/o leukocytosis or bandemia and no focal consolidation on CXR. Abdominal pain and diarrhea likely related to underlying coronavirus infection but cannot r/o independent GI infection. UA positive although patient denies any urinary symptoms. S/p 3.1L NS, zosyn 3.375g x1 and tylenol 975mg suppository in the ED.    - f/u BCx  - treat viral respiratory illness and campylobacter as below

## 2022-05-23 NOTE — CONSULT NOTE ADULT - ATTENDING COMMENTS
89F with pmhx of CKD V (baseline Cr 3.5), HTN, depression who is presenting for 2-3 days of diarrhea and URI like symptoms. She has not felt well which prompted her to come seek care in the ED. She follows with Dr. Garcia from Nephrology.  At baseline has Stage V kidney disease that has remained stable for years.  No uremic signs or symptoms.  The patient has had bladder dysfunction for many years and has intermittent catheterization 2-3 x a day.  History of many UTIs in past.   Patient was evaluated in the ED and found to have an influenza infection.  She is currently using high flow nasal oxygen.      In the ED:  VS: T 101.9, , BP 98/48, RR 22, SpO2 95% on NRB 10L/min  Labs significant for: lactate 2.1 --> 3.0, procal 0.42, K 5.1, Cl 95, BUN/Cr 57/3.22; VBG pH 7.34 / pCO2 57 / pO2 39; UA w/ +bacteria, many wbcs, +nitrites, large leuk esterase, +protein; +Coronavirus, -COVID-19  EKG: sinus tachycardia , 1st degree heart block, TWI in III, ?aVL  CXR: poor inspiratory effort, no consolidation  Interventions: acetaminophen 975mg suppository x1, zosyn 3.375g x1, 2.1L NS, duoneb x1  Consults: ICU (23 May 2022 05:06)    PAST MEDICAL & SURGICAL HISTORY:  Chronic kidney disease, stage IV (severe)  Other specified hypothyroidism  Essential hypertension  Urinary retention  Depression  Gout  H/O abdominal hysterectomy    Patient examination significant for chest findings as described by Dr. Castaneda.      The patient was transferred to the ICU for intensive care, chest PT, and antibiotics as needed.   I agree with Dr. Castaneda's note above.  Creatinine is at patient's recent baseline.
89 yo F w/ hx of HTN, ?COPD, hypothyroidism presents with diarrhea and weakness, seemed to develop hypoxia in ED requiring bipap briefly - unclear etiology. Hypotensive and tachycardic with fever in ED. Appears dry on exam, otherwise extremities WWP, difficult to palpate DP pulses on R. Abdomen with mild TTP diffusely, no guarding/rigidity, lungs with decreased BS at bases otherwise CTA B/L. Alert, oriented, no focal deficits. Labs significant for lactate persisting at 3 despite 2L IVF. SBP fluctuating between 80-100mmHg during this evaluation. Becerra placed by ED, dark concentrated urine of 40cc noted. UA +, COVID + (NOT SARS-Covid 19), likely viral infection causing diarrhea. No obvious infiltrate on CXR but decreased lung volumes and hypoxia noted during sleep. May be more related to RAKAN? But can consider element of viral pna. Giving additional 1L of IVF, follow BP and UOP closely, trend lactate. Consider c diff and stool studies if develops leukocytosis, worsening fever, persistent hypotension. Of note, on valsartan at home. Hold antihypertensives, check TSH, c/w synthroid. Admit to 7L.

## 2022-05-23 NOTE — PATIENT PROFILE ADULT - FALL HARM RISK - HARM RISK INTERVENTIONS

## 2022-05-23 NOTE — ED ADULT NURSE NOTE - NSIMPLEMENTINTERV_GEN_ALL_ED
Implemented All Fall with Harm Risk Interventions:  [place] to call system. Call bell, personal items and telephone within reach. Instruct patient to call for assistance. Room bathroom lighting operational. Non-slip footwear when patient is off stretcher. Physically safe environment: no spills, clutter or unnecessary equipment. Stretcher in lowest position, wheels locked, appropriate side rails in place. Provide visual cue, wrist band, yellow gown, etc. Monitor gait and stability. Monitor for mental status changes and reorient to person, place, and time. Review medications for side effects contributing to fall risk. Reinforce activity limits and safety measures with patient and family. Provide visual clues: red socks.

## 2022-05-23 NOTE — CONSULT NOTE ADULT - PROBLEM SELECTOR RECOMMENDATION 3
Upper respiratory infection as above.  Initially presented on NRB and escalated to BIPAP.  Upon further assessment, able to tolerate NC, currently on 5L satting ~96%.  Patient appears to desat while sleeping, perhaps related to undiagnosed RAKAN? thus requiring additional NC.  While awake requires only ~3L.  Also with ?COPD hx as patient denies this but is on symbicort outpatient per chart review.    - treat infection as above  - wean O2 as tolerated Suspected to be 2/2 upper respiratory infection as above.  Initially presented on NRB and escalated to BIPAP.  Upon further assessment, able to tolerate NC, currently on 5L satting ~96%.  Patient appears to desat while sleeping, perhaps related to undiagnosed RAKAN? thus requiring additional NC.  While awake requires only ~3L.  Also with ?COPD hx as patient denies this but is on symbicort outpatient per chart review.  Lung exam with slight decreased breath sounds in ADRIANA although no iwob, cough, or wheezing, and CXR w/o signs of consolidation.    - treat infection as above  - wean O2 as tolerated

## 2022-05-23 NOTE — PROGRESS NOTE ADULT - ATTENDING COMMENTS
Septic shock now from colitis, possible UTI  physical as above  added NE to keep MAP > 65  follow UO  place arterial line  continue zosyn and ZTX  bolus one liter RL

## 2022-05-23 NOTE — PROGRESS NOTE ADULT - SUBJECTIVE AND OBJECTIVE BOX
KARINA OVIEDO, 89y, Female  MRN-[id number]  Patient is a 89y old  Female who presents with a chief complaint of dairrhea, abdominal pain (23 May 2022 13:34)    ****Transfer from [facility] to MICU****  Hospital Course: 89 yo F with PMHx of HTN, HLD, CKD, hypothyroidism, COPD, Depression, Gout, R heel ulcer presented with non-bloody diarrhea along with abd pain and productive cough since Saturday. Upon arrival, pt febrile, tachycardic with low BPs, needing supple oxygen NC. Exam remarkable for hypovolemia along with abd exam remarkable for diffuse tenderness to palpation. Pt was found with RVP positive for influenza A and coronavirus, GI PCR positive for campylobacter and UA positive. Pt was started on Abx along with maintenance fluids. On , pt with increasing oxygen requirements, decrease in mentation along with iWOB, placed on HFNC with improvement. Repeat VBG remarkable for acidosis along with decrease in bicarb. Pt started on bicarb gtt. Pt stepped up to MICU for closer monitoring.       SUBJECTIVE: Patient seen/examined at bedside. Patient alert, awake and talkative at bedside. Patient stating she just had a bowel movement and she feels dehydrated. Pt states she feels confused.     12 Point ROS Negative unless noted otherwise above.  -------------------------------------------------------------------------------  VITAL SIGNS:  Vital Signs Last 24 Hrs  T(C): 37 (23 May 2022 18:20), Max: 38.8 (22 May 2022 23:56)  T(F): 98.6 (23 May 2022 18:20), Max: 101.9 (22 May 2022 23:56)  HR: 80 (23 May 2022 18:30) (72 - 106)  BP: 100/57 (23 May 2022 18:30) (79/48 - 266/131)  BP(mean): 77 (23 May 2022 18:30) (56 - 90)  RR: 26 (23 May 2022 18:30) (14 - 29)  SpO2: 97% (23 May 2022 18:30) (93% - 98%)  I&O's Summary    23 May 2022 07:01  -  23 May 2022 18:51  --------------------------------------------------------  IN: 460 mL / OUT: 180 mL / NET: 280 mL      PHYSICAL EXAM:    General: sitting in bed; ill-appearing; on HFNC; speaking in full sentences; bald; obese   HEENT: NC/AT; dry mucosal membranes.  Neck: supple; R sided neck mass   Cardiovascular: RRR, +S1/S2; NO M/R/G  Respiratory: CTA B/L; no W/R/R  Gastrointestinal: soft, NT/ND; +BSx4  Extremities: WWP; no edema or cyanosis  Vascular: 2+ radial, DP/PT pulses B/L  Neurological: AAOx2-3    ALLERGIES:  Allergies    No Known Allergies    Intolerances    MEDICATIONS:  MEDICATIONS  (STANDING):  atorvastatin 40 milliGRAM(s) Oral at bedtime  azithromycin   Tablet 500 milliGRAM(s) Oral daily  budesonide 160 MICROgram(s)/formoterol 4.5 MICROgram(s) Inhaler 1 Puff(s) Inhalation two times a day  heparin   Injectable 5000 Unit(s) SubCutaneous every 8 hours  lactated ringers. 1000 milliLiter(s) (100 mL/Hr) IV Continuous <Continuous>  levothyroxine 75 MICROGram(s) Oral daily  pantoprazole    Tablet 40 milliGRAM(s) Oral before breakfast  piperacillin/tazobactam IVPB.. 2.25 Gram(s) IV Intermittent every 6 hours    MEDICATIONS  (PRN):  -------------------------------------------------------------------------------  LABS:                        9.2    8.38  )-----------( 188      ( 23 May 2022 14:43 )             30.7     05-23    135  |  102  |  59<H>  ----------------------------<  129<H>  4.8   |  19<L>  |  3.18<H>    Ca    7.6<L>      23 May 2022 14:43  Phos  5.4       Mg     2.2         TPro  5.7<L>  /  Alb  3.2<L>  /  TBili  0.4  /  DBili  x   /  AST  27  /  ALT  9<L>  /  AlkPhos  64      LIVER FUNCTIONS - ( 23 May 2022 14:43 )  Alb: 3.2 g/dL / Pro: 5.7 g/dL / ALK PHOS: 64 U/L / ALT: 9 U/L / AST: 27 U/L / GGT: x           PT/INR - ( 23 May 2022 00:13 )   PT: 11.4 sec;   INR: 0.96          PTT - ( 23 May 2022 00:13 )  PTT:27.9 sec  Urinalysis Basic - ( 23 May 2022 01:29 )    Color: Yellow / Appearance: Clear / S.020 / pH: x  Gluc: x / Ketone: NEGATIVE  / Bili: Negative / Urobili: 1.0 E.U./dL   Blood: x / Protein: 100 mg/dL / Nitrite: POSITIVE   Leuk Esterase: Large / RBC: < 5 /HPF / WBC Many /HPF   Sq Epi: x / Non Sq Epi: 0-5 /HPF / Bacteria: Present /HPF      CAPILLARY BLOOD GLUCOSE      POCT Blood Glucose.: 167 mg/dL (22 May 2022 23:42)      GI PCR Panel, Stool (collected 23 May 2022 06:43)  Source: .Stool Feces  Final Report (23 May 2022 06:43):    Campylobacter species    DETECTED by PCR    *******Please Note:*******    GI panel PCR evaluates for:    Campylobacter, Plesiomonas shigelloides, Salmonella,    Vibrio, Yersinia enterocolitica, Enteroaggregative    Escherichia coli (EAEC), Enteropathogenic E.coli (EPEC),    Enterotoxigenic E. coli (ETEC) lt/st, Shiga-like    toxin-producing E. coli (STEC) stx1/stx2,    Shigella/ Enteroinvasive E. coli (EIEC), Cryptosporidium,    Cyclospora cayetanensis, Entamoeba histolytica,    Giardia lamblia, Adenovirus F 40/41, Astrovirus,    Norovirus GI/GII, Rotavirus A, Sapovirus    Culture - Blood (collected 23 May 2022 04:03)  Source: .Blood Blood-Peripheral  Preliminary Report (23 May 2022 17:00):    No growth at 12 hours    Culture - Blood (collected 23 May 2022 04:03)  Source: .Blood Blood-Peripheral  Preliminary Report (23 May 2022 17:00):    No growth at 12 hours      SARS-CoV-2: NotDetec (23 May 2022 01:38)  COVID-19 PCR: NotDetec (11 Mar 2022 16:53)      RADIOLOGY & ADDITIONAL TESTS: Reviewed.

## 2022-05-23 NOTE — CONSULT NOTE ADULT - PROBLEM SELECTOR RECOMMENDATION 9
Chronic R heel ulceration Presents with fever, tachycardia, and tachypnea with lactate 2.1, increased to 3.0L after 2.1L NS.  Likely 2/2 severe dehydration from poor PO intake i/s/o coronavirus infection along with diarrhea and vomiting.  Procal slightly elevated at 0.42 although without WBC elevation and no consolidation on CXR.  Abdominal pain and diarrhea likely related to underlying coronavirus infection but cannot r/o independent GI infection.  UA positive although patient denies any urinary symptoms.      - s/p 2.1L NS  - additional 1L NS  - repeat lactate after additional liter fluid  - s/p 3.375mg zosyn in ED although holding off further abx at this time  - f/u BCx  - f/u GI pcr and c. diff  - f/u UA and UCx Chronic R heel ulceration.  Follows with Dr. Oliva (surgery).  Currently wrapped in clean kerlix.  - wound care nurse consult while inpatient

## 2022-05-24 NOTE — PHYSICAL THERAPY INITIAL EVALUATION ADULT - ADDITIONAL COMMENTS
Patient reports she lives in apartment with her  and has 24/7 home health aide. Patient reports she requires assistance of 1 to transfer and ambulate within the home. Patient reports using WC outside the home.

## 2022-05-24 NOTE — PHYSICAL THERAPY INITIAL EVALUATION ADULT - GENERAL OBSERVATIONS, REHAB EVAL
Spoke with AUBREE Schmidt who cleared for OOB. Patient received semi-supine in NAD with +high flow NC FiO2 65% +ECG +IV +heel protector +R foot dressing C/D/I

## 2022-05-24 NOTE — PROGRESS NOTE ADULT - SUBJECTIVE AND OBJECTIVE BOX
KARINA OVIEDO, 89y, Female  MRN-[id number]  Patient is a 89y old  Female who presents with a chief complaint of dairrhea, abdominal pain (23 May 2022 13:34)    OVERNIGHT EVENTS: NAEO     SUBJECTIVE: Pt seen/examined at bedside. Patient awake and sitting up in bed on HFNC.   12 Point ROS Negative unless noted otherwise above.  -------------------------------------------------------------------------------  VITAL SIGNS:  Vital Signs Last 24 Hrs  T(C): 36.9 (24 May 2022 13:00), Max: 37.9 (24 May 2022 01:45)  T(F): 98.4 (24 May 2022 13:00), Max: 100.2 (24 May 2022 01:45)  HR: 77 (24 May 2022 14:20) (75 - 97)  BP: 119/65 (24 May 2022 14:20) (79/48 - 137/60)  BP(mean): 86 (24 May 2022 14:20) (56 - 90)  RR: 25 (24 May 2022 15:25) (11 - 33)  SpO2: 98% (24 May 2022 15:25) (86% - 100%)  I&O's Summary    23 May 2022 07:  -  24 May 2022 07:00  --------------------------------------------------------  IN: 2028.5 mL / OUT: 725 mL / NET: 1303.5 mL    24 May 2022 07:01  -  24 May 2022 15:55  --------------------------------------------------------  IN: 1194.3 mL / OUT: 270 mL / NET: 924.3 mL        PHYSICAL EXAM:    General: NAD, well developed  HEENT: NC/AT; EOMI, PERRLA, anicteric sclera; moist mucosal membranes.  Neck: supple, trachea midline  Cardiovascular: RRR, +S1/S2; NO M/R/G  Respiratory: CTA B/L; no W/R/R  Gastrointestinal: soft, NT/ND; +BSx4  Extremities: WWP; no edema or cyanosis  Vascular: 2+ radial, DP/PT pulses B/L  Neurological: AAOx3; no focal deficits    ALLERGIES:  Allergies    No Known Allergies    Intolerances        MEDICATIONS:  MEDICATIONS  (STANDING):  atorvastatin 40 milliGRAM(s) Oral at bedtime  azithromycin   Tablet 500 milliGRAM(s) Oral daily  budesonide 160 MICROgram(s)/formoterol 4.5 MICROgram(s) Inhaler 1 Puff(s) Inhalation two times a day  cefTRIAXone   IVPB 1000 milliGRAM(s) IV Intermittent every 24 hours  chlorhexidine 2% Cloths 1 Application(s) Topical <User Schedule>  heparin   Injectable 5000 Unit(s) SubCutaneous every 8 hours  lactated ringers. 1000 milliLiter(s) (100 mL/Hr) IV Continuous <Continuous>  levothyroxine 75 MICROGram(s) Oral daily  norepinephrine Infusion 0.05 MICROgram(s)/kG/Min (8.18 mL/Hr) IV Continuous <Continuous>  pantoprazole    Tablet 40 milliGRAM(s) Oral before breakfast    MEDICATIONS  (PRN):      -------------------------------------------------------------------------------  LABS:                        9.2    10.08 )-----------( 198      ( 24 May 2022 06:43 )             31.5     -    138  |  103  |  45<H>  ----------------------------<  139<H>  4.4   |  26  |  3.15<H>    Ca    7.8<L>      24 May 2022 06:43  Phos  4.7     05-24  Mg     1.9     05-24    TPro  5.4<L>  /  Alb  2.9<L>  /  TBili  0.5  /  DBili  x   /  AST  43<H>  /  ALT  13  /  AlkPhos  60  05-24    LIVER FUNCTIONS - ( 24 May 2022 06:43 )  Alb: 2.9 g/dL / Pro: 5.4 g/dL / ALK PHOS: 60 U/L / ALT: 13 U/L / AST: 43 U/L / GGT: x           PT/INR - ( 23 May 2022 00:13 )   PT: 11.4 sec;   INR: 0.96          PTT - ( 23 May 2022 00:13 )  PTT:27.9 sec  Urinalysis Basic - ( 23 May 2022 01:29 )    Color: Yellow / Appearance: Clear / S.020 / pH: x  Gluc: x / Ketone: NEGATIVE  / Bili: Negative / Urobili: 1.0 E.U./dL   Blood: x / Protein: 100 mg/dL / Nitrite: POSITIVE   Leuk Esterase: Large / RBC: < 5 /HPF / WBC Many /HPF   Sq Epi: x / Non Sq Epi: 0-5 /HPF / Bacteria: Present /HPF      CAPILLARY BLOOD GLUCOSE      POCT Blood Glucose.: 167 mg/dL (22 May 2022 23:42)      Culture - Blood (collected 23 May 2022 15:50)  Source: .Blood Blood  Preliminary Report (24 May 2022 04:00):    No growth at 12 hours    GI PCR Panel, Stool (collected 23 May 2022 06:43)  Source: .Stool Feces  Final Report (23 May 2022 06:43):    Campylobacter species    DETECTED by PCR    *******Please Note:*******    GI panel PCR evaluates for:    Campylobacter, Plesiomonas shigelloides, Salmonella,    Vibrio, Yersinia enterocolitica, Enteroaggregative    Escherichia coli (EAEC), Enteropathogenic E.coli (EPEC),    Enterotoxigenic E. coli (ETEC) lt/st, Shiga-like    toxin-producing E. coli (STEC) stx1/stx2,    Shigella/ Enteroinvasive E. coli (EIEC), Cryptosporidium,    Cyclospora cayetanensis, Entamoeba histolytica,    Giardia lamblia, Adenovirus F 40/41, Astrovirus,    Norovirus GI/GII, Rotavirus A, Sapovirus    Culture - Blood (collected 23 May 2022 04:03)  Source: .Blood Blood-Peripheral  Preliminary Report (24 May 2022 05:00):    No growth at 1 day.    Culture - Blood (collected 23 May 2022 04:03)  Source: .Blood Blood-Peripheral  Preliminary Report (24 May 2022 05:00):    No growth at 1 day.    Culture - Urine (collected 23 May 2022 01:41)  Source: Clean Catch Clean Catch (Midstream)  Preliminary Report (24 May 2022 13:26):    30,000 CFU/ml Escherichia coli    Susceptibility to follow.      SARS-CoV-2: NotDetec (23 May 2022 01:38)  COVID-19 PCR: NotDetec (11 Mar 2022 16:53)      RADIOLOGY & ADDITIONAL TESTS: Reviewed.   KARINA OVIEDO, 89y, Female  [id number]  Patient is a 89y old  Female who presents with a chief complaint of dairrhea, abdominal pain (23 May 2022 13:34)    OVERNIGHT EVENTS: NAEO     SUBJECTIVE: Pt seen/examined at bedside. Patient awake and sitting up in bed on HFNC. Pt denying any acute complaints at this time.     12 Point ROS Negative unless noted otherwise above.  -------------------------------------------------------------------------------  VITAL SIGNS:  Vital Signs Last 24 Hrs  T(C): 36.9 (24 May 2022 13:00), Max: 37.9 (24 May 2022 01:45)  T(F): 98.4 (24 May 2022 13:00), Max: 100.2 (24 May 2022 01:45)  HR: 77 (24 May 2022 14:20) (75 - 97)  BP: 119/65 (24 May 2022 14:20) (79/48 - 137/60)  BP(mean): 86 (24 May 2022 14:20) (56 - 90)  RR: 25 (24 May 2022 15:25) (11 - 33)  SpO2: 98% (24 May 2022 15:25) (86% - 100%)  I&O's Summary    23 May 2022 07:01  -  24 May 2022 07:00  --------------------------------------------------------  IN: 2028.5 mL / OUT: 725 mL / NET: 1303.5 mL    24 May 2022 07:01  -  24 May 2022 15:55  --------------------------------------------------------  IN: 1194.3 mL / OUT: 270 mL / NET: 924.3 mL        PHYSICAL EXAM:    General: sitting up in bed; on HFNC; speaking in full sentences   HEENT: NC/AT; dry mucosal membranes.  Neck: supple  Cardiovascular: RRR, +S1/S2; NO M/R/G  Respiratory: CTA B/L; no W/R/R  Gastrointestinal: soft, NT/ND; +BSx4  Extremities: WWP; no edema or cyanosis  Vascular: 2+ radial, DP/PT pulses B/L  Neurological: AAOx2-3    ALLERGIES:  Allergies    No Known Allergies    Intolerances      MEDICATIONS:  MEDICATIONS  (STANDING):  atorvastatin 40 milliGRAM(s) Oral at bedtime  azithromycin   Tablet 500 milliGRAM(s) Oral daily  budesonide 160 MICROgram(s)/formoterol 4.5 MICROgram(s) Inhaler 1 Puff(s) Inhalation two times a day  cefTRIAXone   IVPB 1000 milliGRAM(s) IV Intermittent every 24 hours  chlorhexidine 2% Cloths 1 Application(s) Topical <User Schedule>  heparin   Injectable 5000 Unit(s) SubCutaneous every 8 hours  lactated ringers. 1000 milliLiter(s) (100 mL/Hr) IV Continuous <Continuous>  levothyroxine 75 MICROGram(s) Oral daily  norepinephrine Infusion 0.05 MICROgram(s)/kG/Min (8.18 mL/Hr) IV Continuous <Continuous>  pantoprazole    Tablet 40 milliGRAM(s) Oral before breakfast    MEDICATIONS  (PRN):  -------------------------------------------------------------------------------  LABS:                        9.2    10.08 )-----------( 198      ( 24 May 2022 06:43 )             31.5     05-    138  |  103  |  45<H>  ----------------------------<  139<H>  4.4   |  26  |  3.15<H>    Ca    7.8<L>      24 May 2022 06:43  Phos  4.7     05-24  Mg     1.9     05-24    TPro  5.4<L>  /  Alb  2.9<L>  /  TBili  0.5  /  DBili  x   /  AST  43<H>  /  ALT  13  /  AlkPhos  60  05-24    LIVER FUNCTIONS - ( 24 May 2022 06:43 )  Alb: 2.9 g/dL / Pro: 5.4 g/dL / ALK PHOS: 60 U/L / ALT: 13 U/L / AST: 43 U/L / GGT: x           PT/INR - ( 23 May 2022 00:13 )   PT: 11.4 sec;   INR: 0.96       PTT - ( 23 May 2022 00:13 )  PTT:27.9 sec  Urinalysis Basic - ( 23 May 2022 01:29 )    Color: Yellow / Appearance: Clear / S.020 / pH: x  Gluc: x / Ketone: NEGATIVE  / Bili: Negative / Urobili: 1.0 E.U./dL   Blood: x / Protein: 100 mg/dL / Nitrite: POSITIVE   Leuk Esterase: Large / RBC: < 5 /HPF / WBC Many /HPF   Sq Epi: x / Non Sq Epi: 0-5 /HPF / Bacteria: Present /HPF    CAPILLARY BLOOD GLUCOSE    POCT Blood Glucose.: 167 mg/dL (22 May 2022 23:42)    Culture - Blood (collected 23 May 2022 15:50)  Source: .Blood Blood  Preliminary Report (24 May 2022 04:00):    No growth at 12 hours    GI PCR Panel, Stool (collected 23 May 2022 06:43)  Source: .Stool Feces  Final Report (23 May 2022 06:43):    Campylobacter species    DETECTED by PCR    *******Please Note:*******    GI panel PCR evaluates for:    Campylobacter, Plesiomonas shigelloides, Salmonella,    Vibrio, Yersinia enterocolitica, Enteroaggregative    Escherichia coli (EAEC), Enteropathogenic E.coli (EPEC),    Enterotoxigenic E. coli (ETEC) lt/st, Shiga-like    toxin-producing E. coli (STEC) stx1/stx2,    Shigella/ Enteroinvasive E. coli (EIEC), Cryptosporidium,    Cyclospora cayetanensis, Entamoeba histolytica,    Giardia lamblia, Adenovirus F 40/41, Astrovirus,    Norovirus GI/GII, Rotavirus A, Sapovirus    Culture - Blood (collected 23 May 2022 04:03)  Source: .Blood Blood-Peripheral  Preliminary Report (24 May 2022 05:00):    No growth at 1 day.    Culture - Blood (collected 23 May 2022 04:03)  Source: .Blood Blood-Peripheral  Preliminary Report (24 May 2022 05:00):    No growth at 1 day.    Culture - Urine (collected 23 May 2022 01:41)  Source: Clean Catch Clean Catch (Midstream)  Preliminary Report (24 May 2022 13:26):    30,000 CFU/ml Escherichia coli    Susceptibility to follow.      SARS-CoV-2: NotDetec (23 May 2022 01:38)  COVID-19 PCR: NotDetec (11 Mar 2022 16:53)      RADIOLOGY & ADDITIONAL TESTS: Reviewed.

## 2022-05-24 NOTE — DIETITIAN INITIAL EVALUATION ADULT - PERTINENT MEDS FT
MEDICATIONS  (STANDING):  atorvastatin 40 milliGRAM(s) Oral at bedtime  azithromycin   Tablet 500 milliGRAM(s) Oral daily  budesonide 160 MICROgram(s)/formoterol 4.5 MICROgram(s) Inhaler 1 Puff(s) Inhalation two times a day  cefTRIAXone   IVPB 1000 milliGRAM(s) IV Intermittent every 24 hours  chlorhexidine 2% Cloths 1 Application(s) Topical <User Schedule>  heparin   Injectable 5000 Unit(s) SubCutaneous every 8 hours  levothyroxine 75 MICROGram(s) Oral daily  pantoprazole    Tablet 40 milliGRAM(s) Oral before breakfast    MEDICATIONS  (PRN):

## 2022-05-24 NOTE — DIETITIAN INITIAL EVALUATION ADULT - OTHER CALCULATIONS
5'3''  pounds+-10%  Wt 193 pounds, BMI 34.1, %MHP902  IBW for EER d/t Varying EMR wts  Adjusted for age, pressure ulcers, sepsis, malnutrition, Renal  Fluids per team

## 2022-05-24 NOTE — DIETITIAN INITIAL EVALUATION ADULT - NSFNSPHYEXAMSKINFT_GEN_A_CORE
Pressure Injury 1: sacrum, Stage I  Pressure Injury 2: Right:,heel, Unstageable  Pressure Injury 3: none, none  Pressure Injury 4: none, none  Pressure Injury 5: none, none  Pressure Injury 6: none, none  Pressure Injury 7: none, none  Pressure Injury 8: none, none  Pressure Injury 9: none, none  Pressure Injury 10: none, none  Pressure Injury 11: none, none, Pressure Injury 1: sacrum, Stage I  Pressure Injury 2: Right:,heel, Unstageable  Pressure Injury 3: none, none  Pressure Injury 4: none, none  Pressure Injury 5: none, none  Pressure Injury 6: none, none  Pressure Injury 7: none, none  Pressure Injury 8: none, none  Pressure Injury 9: none, none  Pressure Injury 10: none, none  Pressure Injury 11: none, none

## 2022-05-24 NOTE — DIETITIAN INITIAL EVALUATION ADULT - PERTINENT LABORATORY DATA
05-24    138  |  103  |  45<H>  ----------------------------<  139<H>  4.4   |  26  |  3.15<H>    Ca    7.8<L>      24 May 2022 06:43  Phos  4.7     05-24  Mg     1.9     05-24    TPro  5.4<L>  /  Alb  2.9<L>  /  TBili  0.5  /  DBili  x   /  AST  43<H>  /  ALT  13  /  AlkPhos  60  05-24

## 2022-05-24 NOTE — PROGRESS NOTE ADULT - ASSESSMENT
89yo F PMH HTN HLD, CKD (baseline Cr ~3), hypothyroidism, COPD, Depression, Gout, R heel ulcer presented 5/23 w/ nonbloody diarrhea, found to be septic and positive for coronavirus and influenza A, found with campylobacter. Upgraded from [facility] to MICU for increasing O2 requirements and altered mentation. Patient placed on HFNC and transferred to MICU.     Neuro  #Awake, oriented  - improved mentation     Pulmonology  #Acute respiratory failure with hypoxia -- IMPROVING  Presented w/ tachypnea necessitating NRB then BIPAP for increased work of breathing and lethargy. Suspected to be 2/2 URI as above. On further assessment, able to tolerate NC, currently on 5L satting ~96%. Appears to desat while sleeping, possibly related to undiagnosed RAKAN, thus requiring additional NC. Only requires 3L when awake. Also possible underlying COPD; patient denies this but takes Symbicort 160mcg-4.5mcg at home per chart review. Slightly decreased breath sounds in ADRIANA although no iWOB, cough or wheezing, and CXR w/o signs of consolidation.  - patient maintaining oxygen saturations on HFNC FiO2 65% -- wean as tolerated   - treat coronavirus and influenza A as below  - wean O2 as tolerated.    #Upper respiratory infection with Coronavirus    Endorses cough for past 2-3 days and RVP+ for coronavirus and influenza A (not COVID19).  concern for COPD exacerbation   - symptomatic management w/ IVF resuscitation, tylenol, encouraging PO intake and supplemental O2 in form of HFNC     #COPD, severity to be determined.    H/o COPD. Patient denies this or any smoking history although per chart review patient has diagnosis. Per outpatient med review pt appears to be on Symbicort 160mcg-4.5mcg 1 puff BID at home. Now on HFNC  - Monitor SpO2 and wean as tolerated     Cardiac     #HTN (hypertension).   Known h/o HTN, takes Valsartan 320 mg qd, furosemide 20 mg qd  - holding BP meds i/s/o sepsis and severe hypotension -- patient on levophed throughout the night   - official med rec then reintroduce meds when appropriate    #HLD  Known h/o HLD, takes rosuvastatin 10mg qhs at home.  - c/w atorvastatin 40 mg qhs.    GI  #Campylobacter Infection   Presented w/ 2-3d h/o nonbloody diarrhea. Likely 2/2 underlying coronavirus infection although cannot r/o independent GI infection. Pt is diffusely tender to palpation without rebound tenderness on abdominal exam. Campylobacter detected on GI PCR. Pt with low bicarb 2/2 diarrhea. S/p bicarb gtt w/ 3 amps bicarb.   - CO2 improved to 26 on AM labs s/p bicarb gtt   - c/w zithromax 500 mg qd x3d  - c/w maintenance IVF LR @ 100cc/hr   - continue to monitor diarrhea and treat symptomatically     ID  #Severe sepsis 2/2    Met 3/4 SIRS criteria on admission (T 101.9, , RR 22) w/ lactate 2.1-->3.0 w/ +coronavirus (not COVID-19). Sepsis suspected 2/2 severe dehydration from poor PO intake i/s/o coronavirus infection along with fluid loss via diarrhea and vomiting. Slightly elevated procal (0.42) though w/o leukocytosis or bandemia and no focal consolidation on CXR. Abdominal pain and diarrhea likely related to underlying coronavirus infection but cannot r/o independent GI infection. UA positive although patient denies any urinary symptoms. S/p 3.1L NS, zosyn 3.375g x1 and tylenol 975mg suppository in the ED. Coronavirus positive. Influenza positive. Campylobacter positive   - treat viral respiratory illness and campylobacter as above  - monitor strict Is/Os   - maintain LR @ 100cc/hr  - continue to monitor bowel movements 2/2 campylobacter infection -- bicarb improved this AM     Renal  CKD (chronic kidney disease).    Presented with Cr 3.22 which appears to be close to baseline. Currently producing urine.  - monitor Cr  - avoid nephrotoxic medications  - strict Is/Os with simpson in place  - maintain MAP goal >60 -- weaning levophed     MSK   #Heel ulceration.   Has known chronic R heel ulceration. Follows with Dr. Oliva (surgery). Currently wrapped in clean kerlix.  - wound care nurse consult while inpatient -- f/u recs     Endocrine   # Hypothyroidism.    - c/w synthroid 75 mcg qd.      Dispo: MICU  Code: FULL  Diet: Renal Diet  DVT Proph: Heparin subq

## 2022-05-24 NOTE — PROGRESS NOTE ADULT - ATTENDING COMMENTS
The patient is a 89y Female seen and evaluated at bedside remains stable on HFNC.   Her  and home attendant are visiting with her in the CCU.  The patient appears to be doing a little better with max temperature reduced.        Meds:    atorvastatin 40 at bedtime  azithromycin   Tablet 500 daily  budesonide 160 MICROgram(s)/formoterol 4.5 MICROgram(s) Inhaler 1 two times a day  heparin   Injectable 5000 every 8 hours  lactated ringers. 1000 <Continuous>  levothyroxine 75 daily  norepinephrine Infusion 0.05 <Continuous>  pantoprazole    Tablet 40 before breakfast  piperacillin/tazobactam IVPB.. 2.25 every 6 hours    Physical examination basically unchanged with diffuse rhonchi on chest examination.  Becerra in place for chronic urinary retentioni.    Continue current therapy for pneumonia (Influenza positive)  CKD4-5  Not uremic.    I agree with the renal fellow's note above.

## 2022-05-24 NOTE — PROGRESS NOTE ADULT - CRITICAL CARE ATTENDING COMMENT
Acute hypoxemic respiratory failure 2/2 non SARS COV2 coronavirus, influenza A c/b campylobacter diarrhea. FiO2 requirements are decreasing on HFNC.

## 2022-05-24 NOTE — DIETITIAN INITIAL EVALUATION ADULT - ENERGY INTAKE
89yo F PMH CKD (Cr 3.92 3/22), HTN, hypothyroidism, depression, gout, R heel ulcer presenting for 2-3 days of diarrhea. States she has been having approx 3 watery bms/day, which is not the norm for her. Diarrhea is not associated with abdominal pain and is not bloody. Also endorses a few episodes of emesis, also nonbloody. States she took an antidiarrheal medication with minimal relief. Pt found to be severely septic with lactate, positive for coronavirus (not COVID19), admitted to [facility] for persistent hypotension likely 2/2 dehydration, influenza A, campylobacter. Upgraded from [facility] to MICU for increasing O2 requirements and altered mentation. Pt placed on HFNC and transferred to MICU.     Spoke with it SIRENA who has worked with pt x10 years. Pt soundly sleeping. Pt usually eats 2 meals/day; breakfast: fruit, eggs, toast, water/juice / dinner: lamb chop, vegetable/fruit. Over last week PTA pt with decreased PO intake. Assume not meeting increased EER (increased in setting of pressure ulcers) Reported at loss as result;  pounds, reported to now be 147 pounds, ~9 pounds down (5.7% body at loss). Remains with edema which may be masking full wt loss extent; 1+BL ankle/foot edema. Admit wt 193 pounds-not accurate. Pt does not take ensure PTA, will not take now despite reported on going poor PO intake at this time. NKFA, no issues chewing/swallowing. CDIFF negative, Ordered for rectal tube for diarrhea. No pain. Freddie 13.   Please see RD recs below.

## 2022-05-24 NOTE — DIETITIAN INITIAL EVALUATION ADULT - ENTER FROM (CAL/KG)
----- Message from Jodi Mahajan sent at 6/4/2018  1:15 PM CDT -----  Contact: pt  Pt would like to be called back regarding rescheduling the appt. 6/14/2018.    Pt can be reached at [phone]  
Left message for pt to call back.   
25

## 2022-05-24 NOTE — PHYSICAL THERAPY INITIAL EVALUATION ADULT - PERTINENT HX OF CURRENT PROBLEM, REHAB EVAL
Patient is a 89 year old female presented with 2-3 days of nonbloody diarrhea, found to be severely septic w/ lactate and positive for coronavirus (not COVID19), admitted to [facility] for persistent hypotension likely 2/2 dehydration.

## 2022-05-24 NOTE — PROGRESS NOTE ADULT - SUBJECTIVE AND OBJECTIVE BOX
Patient is a 89y Female seen and evaluated at bedside remains stable on HFNC.       Meds:    atorvastatin 40 at bedtime  azithromycin   Tablet 500 daily  budesonide 160 MICROgram(s)/formoterol 4.5 MICROgram(s) Inhaler 1 two times a day  heparin   Injectable 5000 every 8 hours  lactated ringers. 1000 <Continuous>  levothyroxine 75 daily  norepinephrine Infusion 0.05 <Continuous>  pantoprazole    Tablet 40 before breakfast  piperacillin/tazobactam IVPB.. 2.25 every 6 hours      T(C): , Max: 37.9 (22 @ 01:45)  T(F): , Max: 100.2 (22 @ 01:45)  HR: 78 (22 @ 12:21)  BP: 125/59 (22 @ 12:00)  BP(mean): 85 (22 @ 12:00)  RR: 16 (22 @ 12:21)  SpO2: 100% (22 @ 12:21)  Wt(kg): --     @ 07:01  -   @ 07:00  --------------------------------------------------------  IN: 2028.5 mL / OUT: 725 mL / NET: 1303.5 mL     @ 07:01  -   @ 13:50  --------------------------------------------------------  IN: 314.7 mL / OUT: 135 mL / NET: 179.7 mL      Review of Systems:  ROS negative except as per HPI    PHYSICAL EXAM:  GENERAL: Alert, awake, oriented on HFNC  HEENT: ROSANA, EOMI, neck supple, no JVP  CHEST/LUNG: Rhonchi appreciated b/l lung fields  HEART: Regular rate and rhythm, no murmur, no gallops, no rub   ABDOMEN: Soft, nontender, non distended  : No flank or supra pubic tenderness.  EXTREMITIES: no pedal edema  Neurology: AAOx3, no focal neurological deficit  SKIN: No rash or skin lesion       LABS:                        9.2    10.08 )-----------( 198      ( 24 May 2022 06:43 )             31.5     05-    138  |  103  |  45<H>  ----------------------------<  139<H>  4.4   |  26  |  3.15<H>    Ca    7.8<L>      24 May 2022 06:43  Phos  4.7     -  Mg     1.9         TPro  5.4<L>  /  Alb  2.9<L>  /  TBili  0.5  /  DBili  x   /  AST  43<H>  /  ALT  13  /  AlkPhos  60  -24      PT/INR - ( 23 May 2022 00:13 )   PT: 11.4 sec;   INR: 0.96          PTT - ( 23 May 2022 00:13 )  PTT:27.9 sec  Urinalysis Basic - ( 23 May 2022 01:29 )    Color: Yellow / Appearance: Clear / S.020 / pH: x  Gluc: x / Ketone: NEGATIVE  / Bili: Negative / Urobili: 1.0 E.U./dL   Blood: x / Protein: 100 mg/dL / Nitrite: POSITIVE   Leuk Esterase: Large / RBC: < 5 /HPF / WBC Many /HPF   Sq Epi: x / Non Sq Epi: 0-5 /HPF / Bacteria: Present /HPF            RADIOLOGY & ADDITIONAL STUDIES:

## 2022-05-24 NOTE — DIETITIAN INITIAL EVALUATION ADULT - ADD RECOMMEND
1. Liberalize diet d/t poor PO. Declined oral nutrition supplements, Rec use if agreeable to taking.  2. Monitor %PO intake, Diet tolerance  3. Monitor Skin, Wts. Pain/GI per team (consider use of banatrol).  4. MVI.  5. RD to remain available for additional nutrition interventions as needed.  ** High Nutrition Risk.

## 2022-05-24 NOTE — PROGRESS NOTE ADULT - ASSESSMENT
89F with pmhx of CKD V (baseline Cr 3.5), HTN, depression who is presenting for 2-3 days of diarrhea and URI like symptoms. Nephrology consulted for CKD management and acidosis    Assessment/Plan:     #CKD Stage V (Baseline Cr 3.5)  #Metabolic acidosis w/ Respiratory acidosis (Resolved)  Labs improving, creatinine remains stable. Remain overall stable from nephrologic stand point  -Trend BMP daily  -Off sodium bicarb gtt; continue with LR @ 100cc/hr  -Avoid nephrotoxic meds  -Monitor I/O's  -Renal diet    Tonya Sims D.O.  PGY 4 - Nephrology Fellow  [phone]

## 2022-05-25 NOTE — ADVANCED PRACTICE NURSE CONSULT - RECOMMEDATIONS
Right heel pressure injury: Cleanse with NS. Apply Iodosorb to wound bed. Cover with foam. Change every other day.

## 2022-05-25 NOTE — PROGRESS NOTE ADULT - ATTENDING COMMENTS
Patient is a 89y Female seen and evaluated at bedside remains stable. Creatinine improving.  Patient has been stepped down to [facility] from the CCU.  She is alert, verbal,in NAD.  Complains of frequent diarrhea likely 2/2 infectious etiology.  He  also has diarrhea.  The patient's renal function is beginning to improve and she has no uremic signs or symptoms.   I agree with the renal fellow's assessment, history and physical.  The patient appears to be improving at this time.

## 2022-05-25 NOTE — PROGRESS NOTE ADULT - PROBLEM SELECTOR PLAN 1
Met 3/4 SIRS criteria on admission (T 101.9, , RR 22) w/ lactate 2.1-->3.0 w/ +coronavirus (not COVID-19). Sepsis suspected 2/2 severe dehydration from poor PO intake i/s/o coronavirus infection along with fluid loss via diarrhea and vomiting. Slightly elevated procal (0.42) though w/o leukocytosis or bandemia and no focal consolidation on CXR. Abdominal pain and diarrhea likely related to underlying coronavirus infection but cannot r/o independent GI infection. UA positive although patient denies any urinary symptoms. S/p 3.1L NS, zosyn 3.375g x1 and tylenol 975mg suppository in the ED.    - f/u BCx ---> NGTD  - treat viral respiratory illness and campylobacter as below Met 3/4 SIRS criteria on admission (T 101.9, , RR 22) w/ lactate 2.1-->3.0 w/ +coronavirus (not COVID-19). Sepsis suspected 2/2 severe dehydration from poor PO intake i/s/o coronavirus infection along with fluid loss via diarrhea and vomiting. Slightly elevated procal (0.42) though w/o leukocytosis or bandemia and no focal consolidation on CXR. Abdominal pain and diarrhea likely related to underlying coronavirus infection but cannot r/o independent GI infection. UA positive although patient denies any urinary symptoms. S/p 3.1L NS, zosyn 3.375g x1 and tylenol 975mg suppository in the ED.    - f/u BCx ---> now with STAPH BACTEREMIA ---> follow up sensitivities and surveillance cultures  - c/w vancomycin ---> follow up van level  - treat viral respiratory illness and campylobacter as below

## 2022-05-25 NOTE — PROGRESS NOTE ADULT - ASSESSMENT
89F with pmhx of CKD V (baseline Cr 3.5), HTN, depression who is presenting for 2-3 days of diarrhea and URI like symptoms. Nephrology consulted for CKD management and acidosis    Assessment/Plan:   #CKD Stage V (Baseline Cr 3.5)  #Metabolic acidosis w/ Respiratory acidosis (Resolved)  Labs improving, creatinine remains stable. Remain overall stable from nephrologic stand point  -Trend BMP daily  -Monitor off IVF  -Avoid nephrotoxic meds  -Monitor I/O's  -Renal diet    Tonya Sims D.O.  PGY 4 - Nephrology Fellow  [phone]

## 2022-05-25 NOTE — PROGRESS NOTE ADULT - SUBJECTIVE AND OBJECTIVE BOX
ACCEPTANCE NOTE MICU TO [address] [place] [address] COURSE  89 yo F with PMHx of HTN, HLD, CKD, hypothyroidism, COPD, Depression, Gout, R heel ulcer presented with non-bloody diarrhea along with abd pain and productive cough since Saturday. Upon arrival, pt febrile, tachycardic with low BPs, needing supple oxygen NC. Exam remarkable for hypovolemia along with abd exam remarkable for diffuse tenderness to palpation. Pt was found with RVP positive for influenza A and coronavirus, GI PCR positive for campylobacter and UA positive. Pt was started on Abx along with maintenance fluids.     On 5/23, pt with increasing oxygen requirements, decrease in mentation along with increased WOB and was placed on HFNC with improvement. Repeat VBG remarkable for acidosis along with decrease in bicarb. Pt started on bicarb gtt. Pt stepped up to MICU for closer monitoring. Patient maintained on HFNC.. Patient intermittently requiring levophed for low BPs however patient weaned off of all pressors around 7pm on 5/24. Patient weaned from HFNC to 6L NC and appears comfortable. Patient with improved diarrhea and GI symptoms.     Patient stable for stepdown to [facility] for further management.     SUBJECTIVE / INTERVAL HPI: Patient seen and examined at bedside. Pt reports improvement in her breathing and diarrhea. No other health complaints, denies pain.     MEDICATIONS  (STANDING):  atorvastatin 40 milliGRAM(s) Oral at bedtime  budesonide 160 MICROgram(s)/formoterol 4.5 MICROgram(s) Inhaler 1 Puff(s) Inhalation two times a day  cefTRIAXone   IVPB 1000 milliGRAM(s) IV Intermittent every 24 hours  chlorhexidine 2% Cloths 1 Application(s) Topical <User Schedule>  heparin   Injectable 5000 Unit(s) SubCutaneous every 8 hours  levothyroxine 75 MICROGram(s) Oral daily  pantoprazole    Tablet 40 milliGRAM(s) Oral before breakfast    MEDICATIONS  (PRN):  acetaminophen     Tablet .. 650 milliGRAM(s) Oral every 6 hours PRN Temp greater or equal to 38C (100.4F), Mild Pain (1 - 3)  melatonin 3 milliGRAM(s) Oral at bedtime PRN Insomnia    Allergies    No Known Allergies    Intolerances        VITAL SIGNS:  Vital Signs Last 24 Hrs  T(C): 37.3 (25 May 2022 12:00), Max: 37.9 (25 May 2022 05:00)  T(F): 99.2 (25 May 2022 12:00), Max: 100.2 (25 May 2022 05:00)  HR: 82 (25 May 2022 12:00) (75 - 103)  BP: 123/59 (25 May 2022 12:00) (109/86 - 162/68)  BP(mean): 84 (25 May 2022 12:00) (77 - 98)  RR: 28 (25 May 2022 12:00) (10 - 31)  SpO2: 94% (25 May 2022 12:00) (85% - 100%)      05-24-22 @ 07:01  -  05-25-22 @ 07:00  --------------------------------------------------------  IN: 1858.8 mL / OUT: 1149 mL / NET: 709.8 mL    05-25-22 @ 07:01  -  05-25-22 @ 13:04  --------------------------------------------------------  IN: 0 mL / OUT: 196 mL / NET: -196 mL        PHYSICAL EXAM:  General: elderly female lying in bed in no apparent distress, w/ O2 NC in place, breathing without accessory muscle use  HEENT: head NC/AT, nonicteric sclera, EOMI, dry MM  Neck: supple  Heart: RRR, +S1/S2  Lungs: slightly decreased breath sounds in [place]; no wheezes/rales/rhonchi  Abdomen: +BSx4, diffusely TTP, no rebound tenderness; watery brown stool accident during exam  Genitourinary: simpson in place draining dark yellow urine  Extremities: toes are cool and w/ chronic vascular changes; no LE edema, R foot wrapped in kerlix  Vascular: 2+ radial pulses b/l, 1+ DP on LLE, difficulty palpating RLE DP pulse  Neuro: A&Ox3, moves all extremities, follows commands appropriately  Psych: speech non-pressured, thoughts goal-oriented  Skin: dry, intact, no visible jaundice    LABS:                        9.7    9.32  )-----------( 203      ( 25 May 2022 06:05 )             32.8     05-25    140  |  103  |  40<H>  ----------------------------<  86  4.6   |  23  |  2.98<H>    Ca    8.5      25 May 2022 06:05  Phos  3.7     05-25  Mg     1.8     05-25    TPro  5.4<L>  /  Alb  2.9<L>  /  TBili  0.5  /  DBili  x   /  AST  43<H>  /  ALT  13  /  AlkPhos  60  05-24        CAPILLARY BLOOD GLUCOSE              RADIOLOGY & ADDITIONAL TESTS: Reviewed. ACCEPTANCE NOTE MICU TO [address] [place] [address] COURSE  89 yo F with PMHx of HTN, HLD, CKD, hypothyroidism, COPD, Depression, Gout, R heel ulcer presented with non-bloody diarrhea along with abd pain and productive cough since Saturday. Upon arrival, pt febrile, tachycardic with low BPs, needing supple oxygen NC. Exam remarkable for hypovolemia along with abd exam remarkable for diffuse tenderness to palpation. Pt was found with RVP positive for influenza A and coronavirus, GI PCR positive for campylobacter and UA positive. Pt was started on Abx along with maintenance fluids.     On 5/23, pt with increasing oxygen requirements, decrease in mentation along with increased WOB and was placed on HFNC with improvement. Repeat VBG remarkable for acidosis along with decrease in bicarb. Pt started on bicarb gtt. Pt stepped up to MICU for closer monitoring. Patient maintained on HFNC.. Patient intermittently requiring levophed for low BPs however patient weaned off of all pressors around 7pm on 5/24. Patient weaned from HFNC to 6L NC and appears comfortable. Patient with improved diarrhea and GI symptoms.     Patient stable for stepdown to [facility] for further management.     SUBJECTIVE / INTERVAL HPI: Patient seen and examined at bedside. Pt reports improvement in her breathing and diarrhea (reports 3 episodes of loose stools today). No other health complaints. pt reports pain in her legs that kept her up last night and is requesting some painkiller for tonight. pt reports increase in her swelling of upper extremities especially right arm.     MEDICATIONS  (STANDING):  atorvastatin 40 milliGRAM(s) Oral at bedtime  budesonide 160 MICROgram(s)/formoterol 4.5 MICROgram(s) Inhaler 1 Puff(s) Inhalation two times a day  cefTRIAXone   IVPB 1000 milliGRAM(s) IV Intermittent every 24 hours  chlorhexidine 2% Cloths 1 Application(s) Topical <User Schedule>  heparin   Injectable 5000 Unit(s) SubCutaneous every 8 hours  levothyroxine 75 MICROGram(s) Oral daily  pantoprazole    Tablet 40 milliGRAM(s) Oral before breakfast    MEDICATIONS  (PRN):  acetaminophen     Tablet .. 650 milliGRAM(s) Oral every 6 hours PRN Temp greater or equal to 38C (100.4F), Mild Pain (1 - 3)  melatonin 3 milliGRAM(s) Oral at bedtime PRN Insomnia    Allergies    No Known Allergies    Intolerances        VITAL SIGNS:  Vital Signs Last 24 Hrs  T(C): 37.3 (25 May 2022 12:00), Max: 37.9 (25 May 2022 05:00)  T(F): 99.2 (25 May 2022 12:00), Max: 100.2 (25 May 2022 05:00)  HR: 82 (25 May 2022 12:00) (75 - 103)  BP: 123/59 (25 May 2022 12:00) (109/86 - 162/68)  BP(mean): 84 (25 May 2022 12:00) (77 - 98)  RR: 28 (25 May 2022 12:00) (10 - 31)  SpO2: 94% (25 May 2022 12:00) (85% - 100%)      05-24-22 @ 07:01  -  05-25-22 @ 07:00  --------------------------------------------------------  IN: 1858.8 mL / OUT: 1149 mL / NET: 709.8 mL    05-25-22 @ 07:01  -  05-25-22 @ 13:04  --------------------------------------------------------  IN: 0 mL / OUT: 196 mL / NET: -196 mL        PHYSICAL EXAM:  General: elderly female lying in bed in no apparent distress, w/ O2 NC in place, breathing without accessory muscle use  HEENT: head NC/AT, nonicteric sclera, EOMI, dry MM  Neck: supple  Heart: RRR, +S1/S2  Lungs: CTAB  Abdomen: +BSx4, diffusely TTP, no rebound tenderness; watery brown stool accident during exam  Genitourinary: simpson in place, clear yellow urine  Extremities: WWP, UE swelling R>L, R foot wrapped in kerlix  Vascular: 2+ radial pulses b/l, 2+ dp pulses  Neuro: A&Ox3, moves all extremities, follows commands appropriately, was able to do hand  with both hands but decrease ability with right hand  Psych: speech non-pressured, thoughts goal-oriented  Skin: dry, intact, no visible jaundice    LABS:                        9.7    9.32  )-----------( 203      ( 25 May 2022 06:05 )             32.8     05-25    140  |  103  |  40<H>  ----------------------------<  86  4.6   |  23  |  2.98<H>    Ca    8.5      25 May 2022 06:05  Phos  3.7     05-25  Mg     1.8     05-25    TPro  5.4<L>  /  Alb  2.9<L>  /  TBili  0.5  /  DBili  x   /  AST  43<H>  /  ALT  13  /  AlkPhos  60  05-24        CAPILLARY BLOOD GLUCOSE              RADIOLOGY & ADDITIONAL TESTS: Reviewed. ACCEPTANCE NOTE MICU TO [address] [place] [address] COURSE  89 yo F with PMHx of HTN, HLD, CKD, hypothyroidism, COPD, Depression, Gout, R heel ulcer presented with non-bloody diarrhea along with abd pain and productive cough since Saturday. Upon arrival, pt febrile, tachycardic with low BPs, needing supple oxygen NC. Exam remarkable for hypovolemia along with abd exam remarkable for diffuse tenderness to palpation. Pt was found with RVP positive for influenza A and coronavirus, GI PCR positive for campylobacter and UA positive. Pt was started on Abx along with maintenance fluids.     On 5/23, pt with increasing oxygen requirements, decrease in mentation along with increased WOB and was placed on HFNC with improvement. Repeat VBG remarkable for acidosis along with decrease in bicarb. Pt started on bicarb gtt. Pt stepped up to MICU for closer monitoring. Patient maintained on HFNC.. Patient intermittently requiring levophed for low BPs however patient weaned off of all pressors around 7pm on 5/24. Patient weaned from HFNC to 6L NC and appears comfortable. Patient with improved diarrhea and GI symptoms.     Patient stable for stepdown to [facility] for further management. Now with blood cultures growing gram positive in cocci and clusters 9STAPH BACTEREMIA). Started vancomycin.     SUBJECTIVE / INTERVAL HPI: Patient seen and examined at bedside. Pt reports improvement in her breathing and diarrhea (reports 3 episodes of loose stools today). No other health complaints. pt reports pain in her legs that kept her up last night and is requesting some painkiller for tonight. pt reports increase in her swelling of upper extremities especially right arm.     MEDICATIONS  (STANDING):  atorvastatin 40 milliGRAM(s) Oral at bedtime  budesonide 160 MICROgram(s)/formoterol 4.5 MICROgram(s) Inhaler 1 Puff(s) Inhalation two times a day  cefTRIAXone   IVPB 1000 milliGRAM(s) IV Intermittent every 24 hours  chlorhexidine 2% Cloths 1 Application(s) Topical <User Schedule>  heparin   Injectable 5000 Unit(s) SubCutaneous every 8 hours  levothyroxine 75 MICROGram(s) Oral daily  pantoprazole    Tablet 40 milliGRAM(s) Oral before breakfast    MEDICATIONS  (PRN):  acetaminophen     Tablet .. 650 milliGRAM(s) Oral every 6 hours PRN Temp greater or equal to 38C (100.4F), Mild Pain (1 - 3)  melatonin 3 milliGRAM(s) Oral at bedtime PRN Insomnia    Allergies    No Known Allergies    Intolerances        VITAL SIGNS:  Vital Signs Last 24 Hrs  T(C): 37.3 (25 May 2022 12:00), Max: 37.9 (25 May 2022 05:00)  T(F): 99.2 (25 May 2022 12:00), Max: 100.2 (25 May 2022 05:00)  HR: 82 (25 May 2022 12:00) (75 - 103)  BP: 123/59 (25 May 2022 12:00) (109/86 - 162/68)  BP(mean): 84 (25 May 2022 12:00) (77 - 98)  RR: 28 (25 May 2022 12:00) (10 - 31)  SpO2: 94% (25 May 2022 12:00) (85% - 100%)      05-24-22 @ 07:01  -  05-25-22 @ 07:00  --------------------------------------------------------  IN: 1858.8 mL / OUT: 1149 mL / NET: 709.8 mL    05-25-22 @ 07:01  -  05-25-22 @ 13:04  --------------------------------------------------------  IN: 0 mL / OUT: 196 mL / NET: -196 mL        PHYSICAL EXAM:  General: elderly female lying in bed in no apparent distress, w/ O2 NC in place, breathing without accessory muscle use  HEENT: head NC/AT, nonicteric sclera, EOMI, dry MM  Neck: supple  Heart: RRR, +S1/S2  Lungs: CTAB  Abdomen: +BSx4, diffusely TTP, no rebound tenderness; watery brown stool accident during exam  Genitourinary: simpson in place, clear yellow urine  Extremities: WWP, UE swelling R>L, R foot wrapped in kerlix  Vascular: 2+ radial pulses b/l, 2+ dp pulses  Neuro: A&Ox3, moves all extremities, follows commands appropriately, was able to do hand  with both hands but decrease ability with right hand  Psych: speech non-pressured, thoughts goal-oriented  Skin: dry, intact, no visible jaundice    LABS:                        9.7    9.32  )-----------( 203      ( 25 May 2022 06:05 )             32.8     05-25    140  |  103  |  40<H>  ----------------------------<  86  4.6   |  23  |  2.98<H>    Ca    8.5      25 May 2022 06:05  Phos  3.7     05-25  Mg     1.8     05-25    TPro  5.4<L>  /  Alb  2.9<L>  /  TBili  0.5  /  DBili  x   /  AST  43<H>  /  ALT  13  /  AlkPhos  60  05-24        CAPILLARY BLOOD GLUCOSE              RADIOLOGY & ADDITIONAL TESTS: Reviewed.

## 2022-05-25 NOTE — PROGRESS NOTE ADULT - PROBLEM SELECTOR PLAN 4
Presented w/ 2-3d h/o nonbloody diarrhea. Likely 2/2 underlying coronavirus infection although cannot r/o independent GI infection. Pt is diffusely tender to palpation without rebound tenderness on abdominal exam.  Campylobacter detected on GI PCR. Pt with low bicarb 2/2 diarrhea. S/p bicarb gtt w/ 3 amps bicarb on 5/23.     - CO2 improved s/p bicarb gtt   - s/p Zithromax 500 mg qd x3d  - continue to monitor diarrhea and treat symptomatically

## 2022-05-25 NOTE — PROGRESS NOTE ADULT - SUBJECTIVE AND OBJECTIVE BOX
Physical Medicine and Rehabilitation Progress Note:    Patient is a 89y old  Female who presents with a chief complaint of SEPSIS; ACUTE UTI     (24 May 2022 20:32)      HPI:  89yo F PMH CKD (Cr 3.92 3/22), HTN, hypothyroidism, depression, gout, R heel ulcer presenting for 2-3 days of diarrhea. States she has been having approx 3 watery bms/day, which is not the norm for her. Diarrhea is not associated with abdominal pain and is not bloody. Also endorses a few episodes of emesis, also nonbloody. States she took an antidiarrheal medication with minimal relief. States her  was diagnosed with the flu 2 days ago and that she has had a dry, nonproductive cough for the past 2 days as well. States she has been eating and drinking less while she has felt sick. Denies recent travel or antibiotic use. Denies CP, SOB, wheezing, nausea, fevers, abdominal pain, back pain, changes to urinary habits.    In the ED:  VS: T 101.9, , BP 98/48, RR 22, SpO2 95% on NRB 10L/min  Labs significant for: lactate 2.1 --> 3.0, procal 0.42, K 5.1, Cl 95, BUN/Cr 57/3.22; VBG pH 7.34 / pCO2 57 / pO2 39; UA w/ +bacteria, many wbcs, +nitrites, large leuk esterase, +protein; +Coronavirus, -COVID-19  EKG: sinus tachycardia , 1st degree heart block, TWI in III, ?aVL  CXR: poor inspiratory effort, no consolidation  Interventions: acetaminophen 975mg suppository x1, zosyn 3.375g x1, 2.1L NS, duoneb x1  Consults: ICU (23 May 2022 05:06)                            9.7    9.32  )-----------( 203      ( 25 May 2022 06:05 )             32.8       05-25    140  |  103  |  40<H>  ----------------------------<  86  4.6   |  23  |  2.98<H>    Ca    8.5      25 May 2022 06:05  Phos  3.7     05-25  Mg     1.8     05-25    TPro  5.4<L>  /  Alb  2.9<L>  /  TBili  0.5  /  DBili  x   /  AST  43<H>  /  ALT  13  /  AlkPhos  60  05-24    Vital Signs Last 24 Hrs  T(C): 37.3 (25 May 2022 12:00), Max: 37.9 (25 May 2022 05:00)  T(F): 99.2 (25 May 2022 12:00), Max: 100.2 (25 May 2022 05:00)  HR: 74 (25 May 2022 13:30) (74 - 103)  BP: 112/51 (25 May 2022 13:30) (109/53 - 162/68)  BP(mean): 73 (25 May 2022 13:30) (73 - 98)  RR: 19 (25 May 2022 13:30) (10 - 31)  SpO2: 95% (25 May 2022 13:30) (85% - 100%)    MEDICATIONS  (STANDING):  atorvastatin 40 milliGRAM(s) Oral at bedtime  budesonide 160 MICROgram(s)/formoterol 4.5 MICROgram(s) Inhaler 1 Puff(s) Inhalation two times a day  cefTRIAXone   IVPB 1000 milliGRAM(s) IV Intermittent every 24 hours  chlorhexidine 2% Cloths 1 Application(s) Topical <User Schedule>  heparin   Injectable 5000 Unit(s) SubCutaneous every 8 hours  levothyroxine 75 MICROGram(s) Oral daily  pantoprazole    Tablet 40 milliGRAM(s) Oral before breakfast    MEDICATIONS  (PRN):  acetaminophen     Tablet .. 650 milliGRAM(s) Oral every 6 hours PRN Temp greater or equal to 38C (100.4F), Mild Pain (1 - 3)  melatonin 3 milliGRAM(s) Oral at bedtime PRN Insomnia    Currently Undergoing Physical/ Occupational Therapy at bedside.    PT/OT Functional Status Assessment:   5/24/2022    Previous Level of Function:     · Ambulation Skills	needed assist  · Transfer Skills	needed assist  · ADL Skills	needed assist  · Work/Leisure Activity	needed assist  · Additional Comments	Patient reports she lives in apartment with her  and has 24/7 home health aide. Patient reports she requires assistance of 1 to transfer and ambulate within the home. Patient reports using WC outside the home.    Cognitive Status Examination:   · Orientation	oriented to person, place, time and situation  · Level of Consciousness	alert  · Follows Commands and Answers Questions	100% of the time  · Personal Safety and Judgment	intact    Range of Motion Exam:   · Range of Motion Examination	R ankle DF lacking ~15 degress, all other WFL    Manual Muscle Testing:   · Manual Muscle Testing Results	no strength deficits were identified  grossly assessed at least 3/5 throughout    Bed Mobility: Rolling/Turning:     · Level of [place]	moderate assist (50% patients effort)  · Physical Assist/Nonphysical Assist	2 person assist    Bed Mobility: Scooting/Bridging:     · Level of [place]	maximum assist (25% patients effort)  · Physical Assist/Nonphysical Assist	2 person assist    Bed Mobility: Sit to Supine:     · Level of [place]	moderate assist (50% patients effort)  · Physical Assist/Nonphysical Assist	2 person assist    Bed Mobility: Supine to Sit:     · Level of [place]	moderate assist (50% patients effort)  · Physical Assist/Nonphysical Assist	2 person assist    Bed Mobility Analysis:     · Bed Mobility Limitations	decreased ability to use arms for pushing/pulling; decreased ability to use legs for bridging/pushing; impaired ability to control trunk for mobility  · Impairments Contributing to Impaired Bed Mobility	decreased strength; impaired balance    Transfer: Sit to Stand:     · Level of [place]	maximum assist (25% patients effort)  · Physical Assist/Nonphysical Assist	2 person assist; verbal cues  · Assistive Device	hand held assist    Transfer: Stand to Sit:     · Level of [place]	maximum assist (25% patients effort)  · Physical Assist/Nonphysical Assist	2 person assist; verbal cues  · Assistive Device	hand held assist    Sit/Stand Transfer Safety Analysis:     · Transfer Safety Concerns Noted	decreased weight-shifting ability; losing balance  · Impairments Contributing to Impaired Transfers	impaired balance; decreased strength    Balance Skills Assessment:     · Sitting Balance: Static	fair plus  · Sitting Balance: Dynamic	fair balance  · Sit-to-Stand Balance	poor minus  · Standing Balance: Static	poor minus  · Standing Balance: Dynamic	poor minus  · Identified Impairments Contributing to Balance Disturbance	decreased strength    Sensory Examination:   Sensory Examination:    Grossly Intact:   · Gross Sensory Examination	Grossly Intact      Clinical Impressions:   · Criteria for Skilled Therapeutic Interventions	impairments found; functional limitations in following categories; anticipated discharge recommendation  · Impairments Found (describe specific impairments)	aerobic capacity/endurance; gait, locomotion, and balance; muscle strength  · Rehab Potential	good, to achieve stated therapy goals  · Therapy Frequency	2-3x/week            PM&R Impression: as above    Current Disposition Plan Recommendations:    subacute rehab placement

## 2022-05-25 NOTE — PROGRESS NOTE ADULT - ASSESSMENT
89yo F PMH HTN HLD, CKD (baseline Cr ~3), hypothyroidism, COPD, Depression, Gout, R heel ulcer presented 5/23 w/ nonbloody diarrhea, found to be septic and positive for coronavirus and influenza A, found with campylobacter. Upgraded from [facility] to [facility] for increasing O2 requirements and altered mentation. Patient placed on HFNC and transferred to MICU.     Neuro  #Awake, oriented    Pulmonology  #Acute respiratory failure with hypoxia -- IMPROVING  Presented w/ tachypnea necessitating NRB then BIPAP for increased work of breathing and lethargy. Suspected to be 2/2 URI as above. On further assessment, able to tolerate NC, currently on 5L satting ~96%. Appears to desat while sleeping, possibly related to undiagnosed RAKAN, thus requiring additional NC. Only requires 3L when awake. Also possible underlying COPD; patient denies this but takes Symbicort 160mcg-4.5mcg at home per chart review. Slightly decreased breath sounds in [place]RI[place] although no iWOB, cough or wheezing, and CXR w/o signs of consolidation.  - patient weaned from HFNC FiO2 65% to 6L NC with no acute distress noted   - treat coronavirus and influenza A as below  - continue to wean O2 as tolerated.    #Upper respiratory infection with Coronavirus    Endorses cough for past 2-3 days and RVP+ for coronavirus and influenza A (not COVID19).  concern for COPD exacerbation   - symptomatic management w/ IVF resuscitation, tylenol, encouraging PO intake and supplemental O2 in form of HFNC   - patient initially requiring HFNC however now weaned to 6L NC     #COPD, severity to be determined.    H/o COPD. Patient denies this or any smoking history although per chart review patient has diagnosis. Per outpatient med review pt appears to be on Symbicort 160mcg-4.5mcg 1 puff BID at home. Now on NC     Cardiac     #HTN (hypertension).   Known h/o HTN, takes Valsartan 320 mg qd, furosemide 20 mg qd  - patient no longer requiring Levophed since 7pm on 5/24 -- maintaining MAPs >65   - resume home meds as appropriate     #HLD  Known h/o HLD, takes rosuvastatin 10mg qhs at home.  - c/w atorvastatin 40 mg qhs.    GI  #Campylobacter Infection   Presented w/ 2-3d h/o nonbloody diarrhea. Likely 2/2 underlying coronavirus infection although cannot r/o independent GI infection. Pt is diffusely tender to palpation without rebound tenderness on abdominal exam. Campylobacter detected on GI PCR. Pt with low bicarb 2/2 diarrhea. S/p bicarb gtt w/ 3 amps bicarb on 5/23.   - CO2 improved s/p bicarb gtt   - c/w Zithromax 500 mg qd x3d  - continue to monitor diarrhea and treat symptomatically     ID  #Severe sepsis 2/2 Campylobacter, Coronavirus and Influenza infections -- RESOLVING    Met 3/4 SIRS criteria on admission (T 101.9, , RR 22) w/ lactate 2.1-->3.0 w/ +coronavirus (not COVID-19). Sepsis suspected 2/2 severe dehydration from poor PO intake i/s/o coronavirus infection along with fluid loss via diarrhea and vomiting. Slightly elevated procal (0.42) though w/o leukocytosis or bandemia and no focal consolidation on CXR. Abdominal pain and diarrhea likely related to underlying coronavirus infection but cannot r/o independent GI infection. UA positive although patient denies any urinary symptoms. S/p 3.1L NS, zosyn 3.375g x1 and tylenol 975mg suppository in the ED. Coronavirus positive. Influenza positive. Campylobacter positive   - treat viral respiratory illness and campylobacter as above  - monitor strict Is/Os   - patient doing well. Weaned from HFNC to NC and improvement in bicarb noted.     Renal  CKD (chronic kidney disease).    Presented with Cr 3.22 which appears to be close to baseline. Currently producing urine.  - AM Cr 2.98 -- at baseline   - avoid nephrotoxic medications  - strict Is/Os with simpson in place  - maintain MAP goal >60 -- weaning levophed     MSK   #Heel ulceration.   Has known chronic R heel ulceration. Follows with Dr. Oliva (surgery). Currently wrapped in clean kerlix.  - wound care nurse consult while inpatient -- f/u recs     Endocrine   # Hypothyroidism.    - c/w synthroid 75 mcg qd.      Dispo: [facility]U --> [facility]   Code: FULL  Diet: Renal Diet  DVT Proph: Heparin subq

## 2022-05-25 NOTE — ADVANCED PRACTICE NURSE CONSULT - ASSESSMENT
Patient with a healing stage-3 pressure injury to right heel measuring 1x0.6x0.1cm with dry dermal base with callused wound edges and periwound tissue. No drainage noted. Wound cleansed with NS. Applied Iodosorb to wound and covered with foam. Treatment well tolerated.

## 2022-05-25 NOTE — PROGRESS NOTE ADULT - SUBJECTIVE AND OBJECTIVE BOX
Patient is a 89y Female seen and evaluated at bedside remains stable. Creatinine improving.       Meds:  acetaminophen     Tablet .. 650 every 6 hours PRN  atorvastatin 40 at bedtime  budesonide 160 MICROgram(s)/formoterol 4.5 MICROgram(s) Inhaler 1 two times a day  cefTRIAXone   IVPB 1000 every 24 hours  chlorhexidine 2% Cloths 1 <User Schedule>  heparin   Injectable 5000 every 8 hours  levothyroxine 75 daily  melatonin 3 at bedtime PRN  pantoprazole    Tablet 40 before breakfast      T(C): , Max: 37.9 (05-25-22 @ 05:00)  T(F): , Max: 100.2 (05-25-22 @ 05:00)  HR: 82 (05-25-22 @ 12:00)  BP: 123/59 (05-25-22 @ 12:00)  BP(mean): 84 (05-25-22 @ 12:00)  RR: 28 (05-25-22 @ 12:00)  SpO2: 94% (05-25-22 @ 12:00)  Wt(kg): --    05-24 @ 07:01  -  05-25 @ 07:00  --------------------------------------------------------  IN: 1858.8 mL / OUT: 1149 mL / NET: 709.8 mL    05-25 @ 07:01  -  05-25 @ 12:09  --------------------------------------------------------  IN: 0 mL / OUT: 166 mL / NET: -166 mL      Review of Systems:  ROS negative except as per HPI      PHYSICAL EXAM:  GENERAL: Alert, awake, oriented on NC  HEENT: ROSANA, EOMI, neck supple, no JVP  CHEST/LUNG: Rhonchi appreciated b/l lung fields  HEART: Regular rate and rhythm, no murmur, no gallops, no rub   ABDOMEN: Soft, nontender, non distended  : No flank or supra pubic tenderness.  EXTREMITIES: no pedal edema  Neurology: AAOx3, no focal neurological deficit  SKIN: No rash or skin lesion         LABS:                        9.7    9.32  )-----------( 203      ( 25 May 2022 06:05 )             32.8     05-25    140  |  103  |  40<H>  ----------------------------<  86  4.6   |  23  |  2.98<H>    Ca    8.5      25 May 2022 06:05  Phos  3.7     05-25  Mg     1.8     05-25    TPro  5.4<L>  /  Alb  2.9<L>  /  TBili  0.5  /  DBili  x   /  AST  43<H>  /  ALT  13  /  AlkPhos  60  05-24                RADIOLOGY & ADDITIONAL STUDIES:

## 2022-05-25 NOTE — PROGRESS NOTE ADULT - PROBLEM SELECTOR PLAN 2
Presented w/ tachypnea necessitating NRB then BIPAP for increased work of breathing and lethargy. Suspected to be 2/2 URI as above. On further assessment, able to tolerate NC, currently on 5L satting ~96%. Appears to desat while sleeping, possibly related to undiagnosed RAKAN? thus requiring additional NC. Only requires 3L when awake. Also possible underlying COPD; patient denies this but takes Symbicort 160mcg-4.5mcg at home per chart review. Slightly decreased breath sounds in [place]RI[place] although no iWOB, cough or wheezing, and CXR w/o signs of consolidation.    - treat coronavirus and influenza A as below  - wean O2 as tolerated ---> currently on 6L NC

## 2022-05-25 NOTE — ADVANCED PRACTICE NURSE CONSULT - REASON FOR CONSULT
right heel unstageable pressure injury, present on admission    History of Present Illness:   89yo F PMH CKD (Cr 3.92 3/22), HTN, hypothyroidism, depression, gout, R heel ulcer presenting for 2-3 days of diarrhea. States she has been having approx 3 watery bms/day, which is not the norm for her. Diarrhea is not associated with abdominal pain and is not bloody. Also endorses a few episodes of emesis, also nonbloody. States she took an antidiarrheal medication with minimal relief. States her  was diagnosed with the flu 2 days ago and that she has had a dry, nonproductive cough for the past 2 days as well. States she has been eating and drinking less while she has felt sick. Denies recent travel or antibiotic use. Denies CP, SOB, wheezing, nausea, fevers, abdominal pain, back pain, changes to urinary habits.

## 2022-05-25 NOTE — PROGRESS NOTE ADULT - ASSESSMENT
per Internal Medicine    89 y o F PMH HTN HLD, CKD (baseline Cr ~3), hypothyroidism, COPD, Depression, Gout, R heel ulcer presented 5/23 w/ nonbloody diarrhea, found to be septic and positive for coronavirus and influenza A, found with campylobacter    Problem/Plan - 1:  ·  Problem: Severe sepsis.   ·  Plan: Met 3/4 SIRS criteria on admission (T 101.9, , RR 22) w/ lactate 2.1-->3.0 w/ +coronavirus (not COVID-19). Sepsis suspected 2/2 severe dehydration from poor PO intake i/s/o coronavirus infection along with fluid loss via diarrhea and vomiting. Slightly elevated procal (0.42) though w/o leukocytosis or bandemia and no focal consolidation on CXR. Abdominal pain and diarrhea likely related to underlying coronavirus infection but cannot r/o independent GI infection. UA positive although patient denies any urinary symptoms. S/p 3.1L NS, zosyn 3.375g x1 and tylenol 975mg suppository in the ED.    - f/u BCx ---> NGTD  - treat viral respiratory illness and campylobacter as below.    Problem/Plan - 2:  ·  Problem: Acute respiratory failure with hypoxia.   ·  Plan: Presented w/ tachypnea necessitating NRB then BIPAP for increased work of breathing and lethargy. Suspected to be 2/2 URI as above. On further assessment, able to tolerate NC, currently on 5L satting ~96%. Appears to desat while sleeping, possibly related to undiagnosed RAKAN? thus requiring additional NC. Only requires 3L when awake. Also possible underlying COPD; patient denies this but takes Symbicort 160mcg-4.5mcg at home per chart review. Slightly decreased breath sounds in [place]RI[place] although no iWOB, cough or wheezing, and CXR w/o signs of consolidation.    - treat coronavirus and influenza A as below  - wean O2 as tolerated ---> currently on 6L NC.    Problem/Plan - 3:  ·  Problem: Upper respiratory infection.   ·  Plan: Endorses cough for past 2-3 days and RVP+ for coronavirus and influenza A (not COVID19).  concern for COPD exacerbation     - symptomatic management w/ IVF resuscitation, tylenol, encouraging PO intake and supplemental O2 PRN.    Problem/Plan - 4:  ·  Problem: Campylobacter diarrhea.   ·  Plan: Presented w/ 2-3d h/o nonbloody diarrhea. Likely 2/2 underlying coronavirus infection although cannot r/o independent GI infection. Pt is diffusely tender to palpation without rebound tenderness on abdominal exam.  Campylobacter detected on GI PCR. Pt with low bicarb 2/2 diarrhea. S/p bicarb gtt w/ 3 amps bicarb on 5/23.     - CO2 improved s/p bicarb gtt   - s/p Zithromax 500 mg qd x3d  - continue to monitor diarrhea and treat symptomatically.    Problem/Plan - 5:  ·  Problem: Acute UTI.   ·  Plan: UA + for nitrites, LE, WBC, blood, bacteria although patient w/o any symptoms and no WBC elevation, and renal epithelial cells in UA as well. Becerra was placed in the ED, draining dark yellow urine.    - c/w CTX 1g qd for 5d until 5/27/22.    Problem/Plan - 6:  ·  Problem: HTN (hypertension).   ·  Plan: Known h/o HTN, takes Valsartan 320 mg qd, furosemide 20 mg qd  - holding BP meds i/s/o sepsis and severe hypotension    #HLD  Known h/o HLD, takes rosuvastatin 10mg qhs at home.  - c/w atorvastatin 40 mg qhs.    Problem/Plan - 7:  ·  Problem: CKD (chronic kidney disease).   ·  Plan: Presented with Cr 3.22 which appears to be close to baseline. Currently producing urine.    - monitor Cr  - avoid nephrotoxic medications.    Problem/Plan - 8:  ·  Problem: Heel ulceration.   ·  Plan: Has known chronic R heel ulceration. Follows with Dr. Oliva (surgery). Currently wrapped in clean kerlix.  - c/w wound care.    Problem/Plan - 9:  ·  Problem: Hypothyroidism.   ·  Plan: - c/w synthroid 75 mcg qd.    Problem/Plan - 10:  ·  Problem: COPD, severity to be determined.   ·  Plan; H/o COPD. Patient denies this or any smoking history although per chart review patient has diagnosis.   Per outpatient med review pt appears to be on Symbicort 160mcg-4.5mcg 1 puff BID at home.   Now on NC     H/o depression though denies this diagnosis.   Per chart review patient has been rx'd Duloxetine DR 30mg qd, Desipramine 10mg qd.    Problem/Plan - 11:  ·  Problem: Prophylactic measure.   ·  Plan: F: None  E: replete cautiously  Diet: Renal  DVT PPx: heparin subQ  GI PPx: PPI  Code: DNR/DNI  Dispo: [facility].

## 2022-05-25 NOTE — PROGRESS NOTE ADULT - PROBLEM SELECTOR PLAN 11
F: None  E: replete cautiously  Diet: Renal  DVT PPx: heparin subQ  GI PPx: PPI  Code: DNR/DNI  Dispo: [facility]

## 2022-05-25 NOTE — PROGRESS NOTE ADULT - SUBJECTIVE AND OBJECTIVE BOX
MRN-[id number]  Patient is a 89y old  Female who presents with a chief complaint of SEPSIS; ACUTE UTI   (24 May 2022 20:32)    ****Transfer from [facility] to [facility]****  Hospital Course: 89 yo F with PMHx of HTN, HLD, CKD, hypothyroidism, COPD, Depression, Gout, R heel ulcer presented with non-bloody diarrhea along with abd pain and productive cough since Saturday. Upon arrival, pt febrile, tachycardic with low BPs, needing supple oxygen NC. Exam remarkable for hypovolemia along with abd exam remarkable for diffuse tenderness to palpation. Pt was found with RVP positive for influenza A and coronavirus, GI PCR positive for campylobacter and UA positive. Pt was started on Abx along with maintenance fluids. On 5/23, pt with increasing oxygen requirements, decrease in mentation along with increased WOB and was placed on HFNC with improvement. Repeat VBG remarkable for acidosis along with decrease in bicarb. Pt started on bicarb gtt. Pt stepped up to MICU for closer monitoring. Patient maintained on HFNC and continued with COVID-19 treatment with decadron and remdesivir. Patient intermittently requiring levophed for low BPs however patient weaned off of all pressors around 7pm on 5/24. Patient weaned from HFNC to 6L NC and appears comfortable. Patient with improved diarrhea and GI symptoms. Patient stable for stepdown to [facility] for further management.     OVERNIGHT EVENTS: Patient with wheezing noted and given duonebs with improvement noted. Resumed home lyrica.     SUBJECTIVE: Pt seen/examined at bedside. Patient stating she is doing well and feels comfortable on the nasal canula. Pt otherwise stable.     12 Point ROS Negative unless noted otherwise above.  -------------------------------------------------------------------------------  VITAL SIGNS:  Vital Signs Last 24 Hrs  T(C): 37.4 (25 May 2022 09:14), Max: 37.9 (25 May 2022 05:00)  T(F): 99.3 (25 May 2022 09:14), Max: 100.2 (25 May 2022 05:00)  HR: 81 (25 May 2022 11:00) (75 - 103)  BP: 119/57 (25 May 2022 11:00) (109/86 - 162/68)  BP(mean): 82 (25 May 2022 11:00) (77 - 98)  RR: 11 (25 May 2022 11:00) (10 - 31)  SpO2: 93% (25 May 2022 11:00) (85% - 100%)  I&O's Summary    24 May 2022 07:01  -  25 May 2022 07:00  --------------------------------------------------------  IN: 1858.8 mL / OUT: 1149 mL / NET: 709.8 mL    25 May 2022 07:01  -  25 May 2022 11:55  --------------------------------------------------------  IN: 0 mL / OUT: 166 mL / NET: -166 mL      PHYSICAL EXAM:    General: sitting up in bed; on 6L NC   HEENT: NC/AT; moist mucosal membranes.  Neck: supple  Cardiovascular: RRR, +S1/S2; NO M/R/G  Respiratory: diminished breath sounds bilaterally  Gastrointestinal: soft, NT/ND; +BSx4  Extremities: WWP; no edema or cyanosis  Vascular: 2+ radial, DP/PT pulses B/L    ALLERGIES:  Allergies    No Known Allergies    Intolerances        MEDICATIONS:  MEDICATIONS  (STANDING):  atorvastatin 40 milliGRAM(s) Oral at bedtime  budesonide 160 MICROgram(s)/formoterol 4.5 MICROgram(s) Inhaler 1 Puff(s) Inhalation two times a day  cefTRIAXone   IVPB 1000 milliGRAM(s) IV Intermittent every 24 hours  chlorhexidine 2% Cloths 1 Application(s) Topical <User Schedule>  heparin   Injectable 5000 Unit(s) SubCutaneous every 8 hours  levothyroxine 75 MICROGram(s) Oral daily  pantoprazole    Tablet 40 milliGRAM(s) Oral before breakfast    MEDICATIONS  (PRN):  acetaminophen     Tablet .. 650 milliGRAM(s) Oral every 6 hours PRN Temp greater or equal to 38C (100.4F), Mild Pain (1 - 3)  melatonin 3 milliGRAM(s) Oral at bedtime PRN Insomnia      -------------------------------------------------------------------------------  LABS:                        9.7    9.32  )-----------( 203      ( 25 May 2022 06:05 )             32.8     05-25    140  |  103  |  40<H>  ----------------------------<  86  4.6   |  23  |  2.98<H>    Ca    8.5      25 May 2022 06:05  Phos  3.7     05-25  Mg     1.8     05-25    TPro  5.4<L>  /  Alb  2.9<L>  /  TBili  0.5  /  DBili  x   /  AST  43<H>  /  ALT  13  /  AlkPhos  60  05-24    LIVER FUNCTIONS - ( 24 May 2022 06:43 )  Alb: 2.9 g/dL / Pro: 5.4 g/dL / ALK PHOS: 60 U/L / ALT: 13 U/L / AST: 43 U/L / GGT: x               CAPILLARY BLOOD GLUCOSE          Culture - Blood (collected 23 May 2022 15:50)  Source: .Blood Blood  Preliminary Report (24 May 2022 16:01):    No growth at 1 day.    GI PCR Panel, Stool (collected 23 May 2022 06:43)  Source: .Stool Feces  Final Report (23 May 2022 06:43):    Campylobacter species    DETECTED by PCR    *******Please Note:*******    GI panel PCR evaluates for:    Campylobacter, Plesiomonas shigelloides, Salmonella,    Vibrio, Yersinia enterocolitica, Enteroaggregative    Escherichia coli (EAEC), Enteropathogenic E.coli (EPEC),    Enterotoxigenic E. coli (ETEC) lt/st, Shiga-like    toxin-producing E. coli (STEC) stx1/stx2,    Shigella/ Enteroinvasive E. coli (EIEC), Cryptosporidium,    Cyclospora cayetanensis, Entamoeba histolytica,    Giardia lamblia, Adenovirus F 40/41, Astrovirus,    Norovirus GI/GII, Rotavirus A, Sapovirus    Culture - Blood (collected 23 May 2022 04:03)  Source: .Blood Blood-Peripheral  Preliminary Report (25 May 2022 05:00):    No growth at 2 days.    Culture - Blood (collected 23 May 2022 04:03)  Source: .Blood Blood-Peripheral  Preliminary Report (25 May 2022 05:00):    No growth at 2 days.    Culture - Urine (collected 23 May 2022 01:41)  Source: Clean Catch Clean Catch (Midstream)  Final Report (25 May 2022 09:26):    30,000 CFU/ml Escherichia coli  Organism: Escherichia coli (25 May 2022 09:26)  Organism: Escherichia coli (25 May 2022 09:26)      SARS-CoV-2: NotDetec (23 May 2022 01:38)  COVID-19 PCR: NotDetec (11 Mar 2022 16:53)      RADIOLOGY & ADDITIONAL TESTS: Reviewed.   MRN-[id number]  Patient is a 89y old  Female who presents with a chief complaint of SEPSIS; ACUTE UTI   (24 May 2022 20:32)    ****Transfer from [facility] to [facility]****  Hospital Course: 89 yo F with PMHx of HTN, HLD, CKD, hypothyroidism, COPD, Depression, Gout, R heel ulcer presented with non-bloody diarrhea along with abd pain and productive cough since Saturday. Upon arrival, pt febrile, tachycardic with low BPs, needing supple oxygen NC. Exam remarkable for hypovolemia along with abd exam remarkable for diffuse tenderness to palpation. Pt was found with RVP positive for influenza A and coronavirus, GI PCR positive for campylobacter and UA positive. Pt was started on Abx along with maintenance fluids. On 5/23, pt with increasing oxygen requirements, decrease in mentation along with increased WOB and was placed on HFNC with improvement. Repeat VBG remarkable for acidosis along with decrease in bicarb. Pt started on bicarb gtt. Pt stepped up to MICU for closer monitoring. Patient maintained on HFNC.. Patient intermittently requiring levophed for low BPs however patient weaned off of all pressors around 7pm on 5/24. Patient weaned from HFNC to 6L NC and appears comfortable. Patient with improved diarrhea and GI symptoms. Patient stable for stepdown to [facility] for further management.     OVERNIGHT EVENTS: Patient with wheezing noted and given duonebs with improvement noted. Resumed home lyrica.     SUBJECTIVE: Pt seen/examined at bedside. Patient stating she is doing well and feels comfortable on the nasal canula. Pt otherwise stable.     12 Point ROS Negative unless noted otherwise above.  -------------------------------------------------------------------------------  VITAL SIGNS:  Vital Signs Last 24 Hrs  T(C): 37.4 (25 May 2022 09:14), Max: 37.9 (25 May 2022 05:00)  T(F): 99.3 (25 May 2022 09:14), Max: 100.2 (25 May 2022 05:00)  HR: 81 (25 May 2022 11:00) (75 - 103)  BP: 119/57 (25 May 2022 11:00) (109/86 - 162/68)  BP(mean): 82 (25 May 2022 11:00) (77 - 98)  RR: 11 (25 May 2022 11:00) (10 - 31)  SpO2: 93% (25 May 2022 11:00) (85% - 100%)  I&O's Summary    24 May 2022 07:01  -  25 May 2022 07:00  --------------------------------------------------------  IN: 1858.8 mL / OUT: 1149 mL / NET: 709.8 mL    25 May 2022 07:01  -  25 May 2022 11:55  --------------------------------------------------------  IN: 0 mL / OUT: 166 mL / NET: -166 mL      PHYSICAL EXAM:    General: sitting up in bed; on 6L NC   HEENT: NC/AT; moist mucosal membranes.  Neck: supple  Cardiovascular: RRR, +S1/S2; NO M/R/G  Respiratory: diminished breath sounds bilaterally  Gastrointestinal: soft, NT/ND; +BSx4  Extremities: WWP; no edema or cyanosis  Vascular: 2+ radial, DP/PT pulses B/L    ALLERGIES:  Allergies    No Known Allergies    Intolerances        MEDICATIONS:  MEDICATIONS  (STANDING):  atorvastatin 40 milliGRAM(s) Oral at bedtime  budesonide 160 MICROgram(s)/formoterol 4.5 MICROgram(s) Inhaler 1 Puff(s) Inhalation two times a day  cefTRIAXone   IVPB 1000 milliGRAM(s) IV Intermittent every 24 hours  chlorhexidine 2% Cloths 1 Application(s) Topical <User Schedule>  heparin   Injectable 5000 Unit(s) SubCutaneous every 8 hours  levothyroxine 75 MICROGram(s) Oral daily  pantoprazole    Tablet 40 milliGRAM(s) Oral before breakfast    MEDICATIONS  (PRN):  acetaminophen     Tablet .. 650 milliGRAM(s) Oral every 6 hours PRN Temp greater or equal to 38C (100.4F), Mild Pain (1 - 3)  melatonin 3 milliGRAM(s) Oral at bedtime PRN Insomnia      -------------------------------------------------------------------------------  LABS:                        9.7    9.32  )-----------( 203      ( 25 May 2022 06:05 )             32.8     05-25    140  |  103  |  40<H>  ----------------------------<  86  4.6   |  23  |  2.98<H>    Ca    8.5      25 May 2022 06:05  Phos  3.7     05-25  Mg     1.8     05-25    TPro  5.4<L>  /  Alb  2.9<L>  /  TBili  0.5  /  DBili  x   /  AST  43<H>  /  ALT  13  /  AlkPhos  60  05-24    LIVER FUNCTIONS - ( 24 May 2022 06:43 )  Alb: 2.9 g/dL / Pro: 5.4 g/dL / ALK PHOS: 60 U/L / ALT: 13 U/L / AST: 43 U/L / GGT: x               CAPILLARY BLOOD GLUCOSE          Culture - Blood (collected 23 May 2022 15:50)  Source: .Blood Blood  Preliminary Report (24 May 2022 16:01):    No growth at 1 day.    GI PCR Panel, Stool (collected 23 May 2022 06:43)  Source: .Stool Feces  Final Report (23 May 2022 06:43):    Campylobacter species    DETECTED by PCR    *******Please Note:*******    GI panel PCR evaluates for:    Campylobacter, Plesiomonas shigelloides, Salmonella,    Vibrio, Yersinia enterocolitica, Enteroaggregative    Escherichia coli (EAEC), Enteropathogenic E.coli (EPEC),    Enterotoxigenic E. coli (ETEC) lt/st, Shiga-like    toxin-producing E. coli (STEC) stx1/stx2,    Shigella/ Enteroinvasive E. coli (EIEC), Cryptosporidium,    Cyclospora cayetanensis, Entamoeba histolytica,    Giardia lamblia, Adenovirus F 40/41, Astrovirus,    Norovirus GI/GII, Rotavirus A, Sapovirus    Culture - Blood (collected 23 May 2022 04:03)  Source: .Blood Blood-Peripheral  Preliminary Report (25 May 2022 05:00):    No growth at 2 days.    Culture - Blood (collected 23 May 2022 04:03)  Source: .Blood Blood-Peripheral  Preliminary Report (25 May 2022 05:00):    No growth at 2 days.    Culture - Urine (collected 23 May 2022 01:41)  Source: Clean Catch Clean Catch (Midstream)  Final Report (25 May 2022 09:26):    30,000 CFU/ml Escherichia coli  Organism: Escherichia coli (25 May 2022 09:26)  Organism: Escherichia coli (25 May 2022 09:26)      SARS-CoV-2: NotDetec (23 May 2022 01:38)  COVID-19 PCR: NotDetec (11 Mar 2022 16:53)      RADIOLOGY & ADDITIONAL TESTS: Reviewed.

## 2022-05-25 NOTE — PROGRESS NOTE ADULT - ASSESSMENT
89yo F PMH HTN HLD, CKD (baseline Cr ~3), hypothyroidism, COPD, Depression, Gout, R heel ulcer presented 5/23 w/ nonbloody diarrhea, found to be septic and positive for coronavirus and influenza A, found with campylobacter 89yo F PMH HTN HLD, CKD (baseline Cr ~3), hypothyroidism, COPD, Depression, Gout, R heel ulcer presented 5/23 w/ nonbloody diarrhea, found to be septic and positive for coronavirus and influenza A, found with campylobacter, c/b staph bacteremia

## 2022-05-26 NOTE — CONSULT NOTE ADULT - PROBLEM SELECTOR RECOMMENDATION 9
.  -ordered IV Tylenol x1  -recommend Tylenol PRNs for Mild/Moderate Pain  -does not require opiates at this time and other pain adjuncts are limited by critical illness/organ failure

## 2022-05-26 NOTE — CHART NOTE - NSCHARTNOTEFT_GEN_A_CORE
Brief HPI:   Ms. Mi is a 89 yo F with PMHx of HTN, HLD, CKD, hypothyroidism, COPD, Depression, Gout, R heel ulcer presented with non-bloody diarrhea along with abd pain and productive cough. Found with RVP positive for influenza A and coronavirus, GI PCR positive for campylobacter and UA positive. She was started on abx and admitted to [facility], but stepped up to MICU due to increased oxygen needs and increased work of breathing, as well as new pressor requirements for hypotension (though to be vasoplegic in nature). Stepped down to [facility] 5/25, and blood cultures from 5/23 growing coag negative staph, for which she received 1x dose of vancomycin. She was also started on duonebs and solumedrol for COPD exacerbation given new wheezing and respiratory distress.    RRT called over head in AM of 5/26 for respiratory distress on HFNC. On arrival, patient with increased work of breahting with accessory muscle use, dyspneic and with audible wheezes. Her extremities were cold and mottled, but she was awake and following commands. Deep NT suctioning was performed with aspiration of thick, clear mucus. She was subsequently placed on BiPAP with improvement in her work of breathing.     Brief PE:   Constiutional: Respiratory distress with accessory muscle use   Lungs: diffuse expiratory wheezes, accessory muscle use  Cardio: tachycardic but RR   Abdomen: soft, ND/NT   : Becerra draining yellow urine  Extremities: cold and mottled, distal pulses diminished   Neuro: no focal deficits, moving all 4s, following commands     Vitals: /113, -120 (sinus), afebrile, RR 30, SPO2 90s on 40% FiO2 on BiPAP     A/P:   Ms. Mi is a 89yo F PMH HTN HLD, CKD (baseline Cr ~3), hypothyroidism, COPD, Depression, Gout, R heel ulcer presented 5/23 w/ nonbloody diarrhea, found to be septic and positive for coronavirus and influenza A, as well as GI PCR+ for campylobacter, course further c/b staph bacteremia (?contaminant), now complicated by COPD exacerbation with RRT called in AM of 5/26.     #Acute on Chronic Respiratory Failure   RRT called AM of 5/26 for respiratory distress, with diffuse expiratory wheezing on exam and accessory muscle use on HFNC   - work of breathing improved on BiPAP   - given multiple viral infections and COPD history with new onset wheezing, most likely COPD exacerbation triggered by underlying infections   - also with copious mucus secretions (clear, apurulent) and some element of mucus plugging contributing to respiratory distress   - s/p solumedrol 40 mg IVP and duonebs   Plan:   - transfer to MICU for closer respiratory monitoring   - c/w duonebs standing q4h   - solumedrol 40 mg IVP x5 days for COPD exacerbation   - sputum culture if continues to produce; at risk for superimposed bacterial infection given underlying viral infections     Dispo: MICU Brief HPI:   Ms. Mi is a 89 yo F with PMHx of HTN, HLD, CKD, hypothyroidism, COPD, Depression, Gout, R heel ulcer presented with non-bloody diarrhea along with abd pain and productive cough. Found with RVP positive for influenza A and coronavirus, GI PCR positive for campylobacter and UA positive. She was started on abx and admitted to [facility], but stepped up to MICU due to increased oxygen needs and increased work of breathing, as well as new pressor requirements for hypotension (though to be vasoplegic in nature). Stepped down to [facility] 5/25, and blood cultures from 5/23 growing coag negative staph, for which she received 1x dose of vancomycin. She was also started on duonebs and solumedrol for COPD exacerbation given new wheezing and respiratory distress.    RRT called over head in AM of 5/26 for respiratory distress on HFNC. On arrival, patient with increased work of breahting with accessory muscle use, dyspneic and with audible wheezes. Her extremities were cold and mottled, but she was awake and following commands. Deep NT suctioning was performed with aspiration of thick, clear mucus. She was subsequently placed on BiPAP with improvement in her work of breathing.     Brief PE:   Constiutional: Respiratory distress with accessory muscle use   Lungs: diffuse expiratory wheezes, accessory muscle use  Cardio: tachycardic but RR   Abdomen: soft, ND/NT   : Becerra draining yellow urine  Extremities: cold and mottled, distal pulses diminished   Neuro: no focal deficits, moving all 4s, following commands     Vitals: /113, -120 (sinus), afebrile, RR 30, SPO2 90s on 40% FiO2 on BiPAP     A/P:   Ms. Mi is a 89yo F PMH HTN HLD, CKD (baseline Cr ~3), hypothyroidism, COPD, Depression, Gout, R heel ulcer presented 5/23 w/ nonbloody diarrhea, found to be septic and positive for coronavirus and influenza A, as well as GI PCR+ for campylobacter, course further c/b staph bacteremia (?contaminant), now complicated by COPD exacerbation with RRT called in AM of 5/26.     #Acute on Chronic Respiratory Failure   RRT called AM of 5/26 for respiratory distress, with diffuse expiratory wheezing on exam and accessory muscle use on HFNC   - work of breathing improved on BiPAP   - given multiple viral infections and COPD history with new onset wheezing, most likely COPD exacerbation triggered by underlying infections   - also with copious mucus secretions (clear, apurulent) and some element of mucus plugging contributing to respiratory distress   - s/p solumedrol 40 mg IVP and duonebs   - s/p dose of vanc for ?staph bacteremia (coag negative on PCR); level subsequently therapeutic given CKD, low concern that decompensation due to worsening bacterial process given adequate antimicrobial coverage and otherwise aseptic at this time   Plan:   - transfer to MICU for closer respiratory monitoring   - c/w duonebs standing q4h   - solumedrol 40 mg IVP x5 days for COPD exacerbation   - sputum culture if continues to produce; at risk for superimposed bacterial infection given underlying viral infections     Dispo: MICU

## 2022-05-26 NOTE — CHART NOTE - NSCHARTNOTEFT_GEN_A_CORE
***Rapid Response Clinical Impact Advanced Care Provider Note***    Patient is a 89 yo F w/PMH of HTN, HLD, CKD (baseline Cr ~3), hypothyroidism, COPD, Depression, Gout, R heel ulcer p/w non-bloody diarrhea, abd pain & productive cough, admitted on 5/23 to [facility] for sepsis w/+UA, +influenza A, +coronavirus, & GI PCR +campylobacter, blood cx growing coag negative staph. Stepped up to MICU 5/24 d/t increased oxygen needs, WOB, & hypotension requiring vasopressors. Stepped back down to [facility] on 5/25, course c/b COPD exacerbation, started on duonebs & solumedrol. s    Rapid response team called because for respiratory distress. On arrival, pt sitting up in bed, A&Ox3, anxious but alert on HFNC w/increased WOB & accessory muscle use, unable to complete sentences. Lung sounds with wheezing bilaterally. Deep NT suctioning performed w/moderate secretions. Pt placed on Bi-PAP w/mild improvement in WOB. O2 sats remained >90% on 40% FiO2. Afebrile, tachycardic to 110s, SBP 150s-180s. PE notable for cool & mottled LEs.    Patient was seen and examined at the bedside by the rapid response team.    Review of Systems:     REVIEW OF SYSTEMS:    CONSTITUTIONAL: No weakness, fevers or chills  EYES/ENT: No visual changes;  No vertigo or throat pain   NECK: No pain or stiffness  RESPIRATORY: +Shortness of breath, +Wheezing  CARDIOVASCULAR: No chest pain or palpitations  GASTROINTESTINAL: No abdominal or epigastric pain. No nausea, vomiting, or hematemesis; No diarrhea or constipation. No melena or hematochezia.  GENITOURINARY: No dysuria, frequency or hematuria  NEUROLOGICAL: No numbness or weakness  SKIN: No itching, rashes      Allergies    No Known Allergies    Intolerances        PAST MEDICAL & SURGICAL HISTORY:  Chronic kidney disease, stage IV (severe)      Other specified hypothyroidism      Essential hypertension      Urinary retention      Depression      Gout      H/O abdominal hysterectomy      History of cholecystectomy      History of hip surgery          Vital Signs Last 24 Hrs  T(C): 37.6 (26 May 2022 09:00), Max: 37.6 (26 May 2022 09:00)  T(F): 99.7 (26 May 2022 09:00), Max: 99.7 (26 May 2022 09:00)  HR: 90 (26 May 2022 14:00) (56 - 115)  BP: 112/66 (26 May 2022 14:00) (104/56 - 189/113)  BP(mean): 85 (26 May 2022 14:00) (74 - 117)  RR: 20 (26 May 2022 14:00) (18 - 47)  SpO2: 100% (26 May 2022 14:00) (84% - 100%)      GENERAL: The patient is awake, alert, anxious, & in acute respiratory distress.   HEENT: Head is normocephalic and atraumatic. Extraocular muscles are intact. Mucous membranes are moist. No throat erythema/exudates no lymphadenopathy, no JVD,   NECK: Supple.  LUNGS: Wheezing heard bilaterally, respirations labored w/accessory muscle use.  HEART: Regular rate and rhythm ,+S1/+S2, no murmurs, rubs, gallops  ABDOMEN: Soft, nontender, and nondistended, no rebound, guarding rigidity, bowel sounds in all 4 quadrants  EXTREMITIES: BLE cool & mottled.  SKIN: No new rashes or lesions.  MSK: strength equal BL  VASCULAR: Radial and Dorsal pedal pulses palpable BL  NEUROLOGIC: Grossly intact.  PSYCH: Anxious while in resp distress    05-25 @ 07:01  -  05-26 @ 07:00  --------------------------------------------------------  IN: 0 mL / OUT: 496 mL / NET: -496 mL    05-26 @ 07:01  -  05-26 @ 15:12  --------------------------------------------------------  IN: 100 mL / OUT: 65 mL / NET: 35 mL                              11.3   8.93  )-----------( 187      ( 26 May 2022 06:12 )             38.0     05-26    134<L>  |  101  |  39<H>  ----------------------------<  189<H>  5.1   |  17<L>  |  2.83<H>    Ca    8.6      26 May 2022 06:11  Phos  4.0     05-26  Mg     1.9     05-26    TPro  6.5  /  Alb  3.0<L>  /  TBili  0.5  /  DBili  x   /  AST  73<H>  /  ALT  30  /  AlkPhos  149<H>  05-26    ABG - ( 26 May 2022 00:43 )  pH, Arterial: 7.34  pH, Blood: x     /  pCO2: 44    /  pO2: 103   / HCO3: 24    / Base Excess: -2.2  /  SaO2: 96.2                 LIVER FUNCTIONS - ( 26 May 2022 06:11 )  Alb: 3.0 g/dL / Pro: 6.5 g/dL / ALK PHOS: 149 U/L / ALT: 30 U/L / AST: 73 U/L / GGT: 61 U/L              MEDICATIONS  (STANDING):  acetylcysteine 20%  Inhalation 4 milliLiter(s) Inhalation every 4 hours  albuterol/ipratropium for Nebulization 3 milliLiter(s) Nebulizer every 4 hours  atorvastatin 40 milliGRAM(s) Oral at bedtime  chlorhexidine 2% Cloths 1 Application(s) Topical <User Schedule>  heparin   Injectable 5000 Unit(s) SubCutaneous every 8 hours  levothyroxine 75 MICROGram(s) Oral daily  methylPREDNISolone sodium succinate Injectable 40 milliGRAM(s) IV Push every 24 hours  pantoprazole    Tablet 40 milliGRAM(s) Oral before breakfast    MEDICATIONS  (PRN):  acetaminophen     Tablet .. 650 milliGRAM(s) Oral every 6 hours PRN Temp greater or equal to 38C (100.4F), Mild Pain (1 - 3)  melatonin 3 milliGRAM(s) Oral at bedtime PRN Insomnia      Assessment & Plan- Rapid Response called for 89y F w/PMH of HTN HLD, CKD (baseline Cr ~3), hypothyroidism, COPD, Depression, Gout, R heel ulcer presented 5/23 w/ nonbloody diarrhea, found to be septic and positive for coronavirus and influenza A, as well as GI PCR+ for campylobacter, course c/b staph bacteremia (?contaminant), now c/b COPD exacerbation likely triggered by underlying viral infections  -c/w Bi-PAP d/t pt w/mild improvement on NIPPV  -c/w 40mg IV Solumedrol  -c/w duonebs Q4H  -chest PT Q2H  -NT suction prn  -f/u sputum culture  -f/u repeat blood cx  -dispo: MICU    I have personally and independently provided 31 minutes of critical care services.  This excludes any time spent on separate procedures or teaching.

## 2022-05-26 NOTE — CONSULT NOTE ADULT - PROBLEM SELECTOR RECOMMENDATION 4
Presents with BP 98/48 with improvement to 116/84 s/p 2.1L NS.  Upon further assessment SBP dropped to 90s and then 80s with patient mentating well and w/o tachycardia.  C/f severe dehydration as mentioned above.  Patient very dry upon exam and endorsing feeling thirsty.  - give additional 1L NS  - monitor BP closely and resuscitate prn
.  Patient is Full Code  - is designated HCP  -family will look for patient's living will    In addition to the E/M visit, an advance care planning meeting was performed. Start time: 12:34PM; End time: 12:50PM; Total time: 16min. A face to face meeting to discuss advance care planning was held today regarding: KARINA OVIEDO. Primary decision maker: Patient is able to participate in decision making; Alternate/surrogate: . Discussed advance directives including, but not limited to, healthcare proxy and code status. Decision regarding code status: FULL CODE

## 2022-05-26 NOTE — PROGRESS NOTE ADULT - ASSESSMENT
Ms. Mi is a 89yo F PMH HTN HLD, CKD (baseline Cr ~3), hypothyroidism, COPD, Depression, Gout, R heel ulcer presented 5/23 w/ nonbloody diarrhea, found to be septic and positive for coronavirus and influenza A, as well as GI PCR+ for campylobacter, course further c/b staph bacteremia (?contaminant), now complicated by COPD exacerbation with RRT called in AM of 5/26. Stepped up to MICU for closer respiratory monitoring.     Neuro  #Awake, oriented    Pulmonology  #Upper respiratory infection with Coronavirus    Endorses cough for past 2-3 days and RVP+ for coronavirus and influenza A (not COVID19).  concern for COPD exacerbation   - symptomatic management w/ IVF resuscitation, tylenol, encouraging PO intake and supplemental O2 in form of HFNC     #COPD, with acute exacerbation and acute on chronic respiratory failure    H/o COPD. Patient denies this or any smoking history although per chart review patient has diagnosis. Per outpatient med review pt appears to be on Symbicort 160mcg-4.5mcg 1 puff BID at home  RRT called AM of 5/26 for respiratory distress, with diffuse expiratory wheezing on exam and accessory muscle use on HFNC, c/w acute exacerbation   - work of breathing improved on BiPAP   - given multiple viral infections and COPD history with new onset wheezing, most likely COPD exacerbation triggered by underlying infections   - also with copious mucus secretions (clear, apurulent) and some element of mucus plugging contributing to respiratory distress   - s/p solumedrol 40 mg IVP and duonebs   - s/p dose of vanc for ?staph bacteremia (coag negative on PCR); level subsequently therapeutic given CKD, low concern that decompensation due to worsening bacterial process given adequate antimicrobial coverage and otherwise aseptic at this time   Plan:   - transferred to MICU for closer respiratory monitoring   - c/w duonebs standing q4h   - solumedrol 40 mg IVP x5 days for COPD exacerbation   - sputum culture if continues to produce; at risk for superimposed bacterial infection given underlying viral infections     Cardiac   #HTN (hypertension).   Known h/o HTN, takes Valsartan 320 mg qd, furosemide 20 mg qd  - patient no longer requiring Levophed since 7pm on 5/24 -- maintaining MAPs >65   - resume home meds as appropriate     #HLD  Known h/o HLD, takes rosuvastatin 10mg qhs at home.  - c/w atorvastatin 40 mg qhs.    GI  #Campylobacter Infection   Presented w/ 2-3d h/o nonbloody diarrhea. Likely 2/2 underlying coronavirus infection although cannot r/o independent GI infection. Pt is diffusely tender to palpation without rebound tenderness on abdominal exam. Campylobacter detected on GI PCR. Pt with low bicarb 2/2 diarrhea. S/p bicarb gtt w/ 3 amps bicarb on 5/23.   - CO2 improved s/p bicarb gtt   - s/p Zithromax 500 mg qd x3d  - diarrhea RESOLVED     ID  #Severe sepsis 2/2 Campylobacter, Coronavirus and Influenza infections; ?staph bacteremia -- RESOLVING   Met 3/4 SIRS criteria on admission (T 101.9, , RR 22) w/ lactate 2.1-->3.0 w/ +coronavirus (not COVID-19). Sepsis suspected 2/2 severe dehydration from poor PO intake i/s/o coronavirus infection along with fluid loss via diarrhea and vomiting. Also flu A and campylobacter +. Blood cultures from 5/23 subsequently with growth of coag negative staph on 5/25  - treat viral respiratory illness and campylobacter as above  - monitor strict Is/Os   - unclear whether coag negative staph contaminant (given it was only in one bottle and she is otherwise aseptic) vs true bacteremia given decompensation; s/p 1x dose of vancomycin wiht levels therapeutic given CKD and reduced clearance   - repeat blood cultures to assess for clearance     Renal  CKD (chronic kidney disease).    Presented with Cr 3.22 which appears to be close to baseline. Currently producing urine.  - Currently at baseline    - avoid nephrotoxic medications  - strict Is/Os with simpson in place  - maintain MAP goal >60; off of pressors at the moment      MSK   #Heel ulceration.   Has known chronic R heel ulceration. Follows with Dr. Oliva (surgery). Currently wrapped in clean kerlix.  - wound care nurse consult while inpatient -- f/u recs      Endocrine   # Hypothyroidism.    - c/w synthroid 75 mcg qd.      Dispo: [facility] --> MICU   Code: FULL  Diet: Renal Diet; NPO while on BiPAP   DVT Proph: Heparin subq

## 2022-05-26 NOTE — CONSULT NOTE ADULT - PROBLEM SELECTOR RECOMMENDATION 2
.  PPSV = 50%, requires assistance for most ADLs  -PT Eval when appropriate  -c/w supportive care, OOB, encourage movement
Endorses cough for past 2-3 days and RVP + for coronavirus (not COVID19).  - symptomatic management

## 2022-05-26 NOTE — PROGRESS NOTE ADULT - SUBJECTIVE AND OBJECTIVE BOX
[facility]     HOSPITAL COURSE:   Ms. Mi is a 89 yo F with PMHx of HTN, HLD, CKD, hypothyroidism, COPD, Depression, Gout, R heel ulcer presented with non-bloody diarrhea along with abd pain and productive cough. Found with RVP positive for influenza A and coronavirus, GI PCR positive for campylobacter and UA positive. She was started on abx and admitted to [facility], but stepped up to MICU due to increased oxygen needs and increased work of breathing, as well as new pressor requirements for hypotension (though to be vasoplegic in nature). Stepped down to [facility] 5/25, and blood cultures from 5/23 growing coag negative staph, for which she received 1x dose of vancomycin. She was also started on duonebs and solumedrol for COPD exacerbation given new wheezing and respiratory distress.    RRT called over head in AM of 5/26 for respiratory distress on HFNC. On arrival, patient with increased work of breahting with accessory muscle use, dyspneic and with audible wheezes. Her extremities were cold and mottled, but she was awake and following commands. Deep NT suctioning was performed with aspiration of thick, clear mucus. She was subsequently placed on BiPAP with improvement in her work of breathing. She is now being stepped up to the MICU for closer respiratory monitoring.     OVERNIGHT EVENTS: New wheezing and respiratory distress, started on duonebs, steroids and bipap for presumed COPD exacerbation.     SUBJECTIVE / INTERVAL HPI: Patient seen and examined at bedside. Much more comfortable on BiPAP, able to speak in full sentences. Breathing less labored, following commands appropriately. LE pain, but ROS otherwise negative.       PHYSICAL EXAM:  Constiutional: Respiratory distress with accessory muscle use   Lungs: diffuse expiratory wheezes, accessory muscle use; less labored on BiPAP   Cardio: tachycardic but RR   Abdomen: soft, ND/NT   : Becerra draining yellow urine  Extremities: cold and mottled, distal pulses diminished   Neuro: no focal deficits, moving all 4s, following commands     VITAL SIGNS:  Vital Signs Last 24 Hrs  T(C): 36.2 (26 May 2022 05:46), Max: 37.4 (25 May 2022 09:14)  T(F): 97.1 (26 May 2022 05:46), Max: 99.3 (25 May 2022 09:14)  HR: 111 (26 May 2022 08:35) (74 - 115)  BP: 150/81 (26 May 2022 08:35) (109/53 - 160/90)  BP(mean): 103 (26 May 2022 08:35) (73 - 117)  RR: 44 (26 May 2022 08:35) (10 - 44)  SpO2: 100% (26 May 2022 08:35) (85% - 100%)      MEDICATIONS:  MEDICATIONS  (STANDING):  albuterol/ipratropium for Nebulization 3 milliLiter(s) Nebulizer every 4 hours  atorvastatin 40 milliGRAM(s) Oral at bedtime  budesonide 160 MICROgram(s)/formoterol 4.5 MICROgram(s) Inhaler 1 Puff(s) Inhalation two times a day  chlorhexidine 2% Cloths 1 Application(s) Topical <User Schedule>  heparin   Injectable 5000 Unit(s) SubCutaneous every 8 hours  levothyroxine 75 MICROGram(s) Oral daily  methylPREDNISolone sodium succinate Injectable 40 milliGRAM(s) IV Push every 24 hours  pantoprazole    Tablet 40 milliGRAM(s) Oral before breakfast    MEDICATIONS  (PRN):  acetaminophen     Tablet .. 650 milliGRAM(s) Oral every 6 hours PRN Temp greater or equal to 38C (100.4F), Mild Pain (1 - 3)  melatonin 3 milliGRAM(s) Oral at bedtime PRN Insomnia      ALLERGIES:  Allergies    No Known Allergies    Intolerances        LABS:                        11.3   8.93  )-----------( 187      ( 26 May 2022 06:12 )             38.0     05-26    134<L>  |  101  |  39<H>  ----------------------------<  189<H>  5.1   |  17<L>  |  2.83<H>    Ca    8.6      26 May 2022 06:11  Phos  4.0     05-26  Mg     1.9     05-26    TPro  6.5  /  Alb  3.0<L>  /  TBili  0.5  /  DBili  x   /  AST  73<H>  /  ALT  30  /  AlkPhos  149<H>  05-26        CAPILLARY BLOOD GLUCOSE      POCT Blood Glucose.: 172 mg/dL (26 May 2022 08:11)      RADIOLOGY & ADDITIONAL TESTS: Reviewed.

## 2022-05-26 NOTE — PROGRESS NOTE ADULT - ASSESSMENT
89F with pmhx of CKD V (baseline Cr 3.5), HTN, depression who is presenting for 2-3 days of diarrhea and URI like symptoms. Nephrology consulted for CKD management and acidosis    Assessment/Plan:   #CKD Stage V (Baseline Cr 3.5)  #Metabolic acidosis w/ Respiratory acidosis (Resolved)  Labs improving, creatinine remains stable. Remains overall stable from nephrologic stand point  -Trend BMP daily  -Start sodium bicarbonate 650mg BID  -Avoid nephrotoxic meds  -Monitor I/O's  -Renal diet    Tonya Sims D.O.  PGY 4 - Nephrology Fellow  [phone]

## 2022-05-26 NOTE — CONSULT NOTE ADULT - PROBLEM SELECTOR RECOMMENDATION 5
.  Complex medical decision making / symptom management in the setting of critical illness.    Will continue to follow for ongoing GOC discussion / titration of palliative regimen. Emotional support provided, questions answered.  Active Psychosocial Referrals:  [x]Social Work/Case management []PT/OT []Chaplaincy []Hospice  []Patient/Family Support []Holistic RN []Massage Therapy []Ethics  Coping: [x] well [] with difficulty [] poor coping [] unable to assess  Support system: [x] strong [] adequate [] inadequate    For new or uncontrolled symptoms, please call Palliative Care at [phone]. The service is available 24/7 (including nights & weekends) to provide symptom management recommendations over the phone as appropriate
Nonbloody, present for past 2-3 days.  Likely 2/2 underlying coronavirus infection although cannot r/o independent GI infection.  Abd exam with TTP diffusely although no rebound.  - treat coronavirus infection  - f/u GI pcr and c. diff

## 2022-05-26 NOTE — PROGRESS NOTE ADULT - CRITICAL CARE ATTENDING COMMENT
Acute hypoxemic respiratory failure 2/2 non SARS COV2 coronavirus, influenza A c/b campylobacter diarrhea. Returned to MICU with acute hypoxemic respiratory failure, mucus plugging, and AMS. Will place on NIV. Aggressive pulmonary toilet.

## 2022-05-26 NOTE — PROGRESS NOTE ADULT - ATTENDING COMMENTS
Patient is a 89y Female seen and evaluated at bedside this morning; RRT this morning to MICU for increased work of breathing. Stable on BiPAP.  In brief, patient dislodged the BIPAP mask and developed respiratory distress and desaturation.  The mask was replaced and the patient improved.  Still recovering from influenza.  Patient was seen and examined.  Discussed with renal fellow.   I agree with renal fellow's note above.  Creatinine improved to 2.83 mg/dl today.

## 2022-05-26 NOTE — CHART NOTE - NSCHARTNOTEFT_GEN_A_CORE
Admitting Diagnosis:   Patient is a 89y old  Female who presents with a chief complaint of SEPSIS; ACUTE UTI  (24 May 2022 20:32)    PAST MEDICAL & SURGICAL HISTORY:  Chronic kidney disease, stage IV (severe)  Other specified hypothyroidism  Essential hypertension  Urinary retention  Depression  Gout  H/O abdominal hysterectomy  History of cholecystectomy  History of hip surgery      Current Nutrition Order:  Diet, Renal Restrictions:   For patients receiving Renal Replacement - No Protein Restr, No Conc K, No Conc Phos, Low Sodium  DASH/TLC {Sodium & Cholesterol Restricted} (DASH) (05-23-22 @ 10:01)    PO Intake: Good (%) [   ]  Fair (<50%) [ x  ] Poor (<25%) [   ]  -Per RN, states pt's po intake is fair consuming </=50% of most meals. Noted pt's po intake decreased for few days prior to admission d/t illness.   *Spoke with RN to encourage pt to eat a minimum of few bites at meals     GI Issues: + 2-4 loose BMs daily since admission   Abd-obese/soft   Pain: Denies   Skin Integrity: Unstageable pressure injury- Rt heel  Stage 1 pressure injury- sacrum   -erythema d/t incontinence   Edema: 2+ BL arms, 3+ BL legs     Labs: Vitamin B12 >2000, sodium 134 L, BUN 39 H, creatinine 2.83 H, glucose 189 H, elevated liver enzymes, GFR 15 L  phosphorus, potassium WNL   *Hx CKD stage IV  05-26    134<L>  |  101  |  39<H>  ----------------------------<  189<H>  5.1   |  17<L>  |  2.83<H>    Ca    8.6      26 May 2022 06:11  Phos  4.0     05-26  Mg     1.9     05-26    TPro  6.5  /  Alb  3.0<L>  /  TBili  0.5  /  DBili  x   /  AST  73<H>  /  ALT  30  /  AlkPhos  149<H>  05-26    POCT Blood Glucose.: 172 mg/dL (26 May 2022 08:11)    Medications:  MEDICATIONS  (STANDING):  acetylcysteine 20%  Inhalation 4 milliLiter(s) Inhalation every 4 hours  albuterol/ipratropium for Nebulization 3 milliLiter(s) Nebulizer every 4 hours  atorvastatin 40 milliGRAM(s) Oral at bedtime  chlorhexidine 2% Cloths 1 Application(s) Topical <User Schedule>  heparin   Injectable 5000 Unit(s) SubCutaneous every 8 hours  levothyroxine Injectable 60 MICROGram(s) IV Push at bedtime  methylPREDNISolone sodium succinate Injectable 40 milliGRAM(s) IV Push every 24 hours  pantoprazole  Injectable 40 milliGRAM(s) IV Push every 24 hours    MEDICATIONS  (PRN):  acetaminophen     Tablet .. 650 milliGRAM(s) Oral every 6 hours PRN Temp greater or equal to 38C (100.4F), Mild Pain (1 - 3)  melatonin 3 milliGRAM(s) Oral at bedtime PRN Insomnia    Anthropometrics:  Ht: 160cm (63")  Wt: 87.3kg (5/23)    IBW: 115# (52.3kg)    % IBW: 167%    Weight Change: Suspect current weight altered d/t 2-3+ edema in extremities. Per RD note on 5/24, UBW is 156# (70.9kg)   -Please obtain daily weight to trend    Malnutrition: Moderate malnutrition in the context of acute illness   -See full RD assessment 5/24    Estimated energy needs: 52.3kg  IBW for EER d/t Varying EMR wts. Adjusted for age, pressure ulcers, sepsis, malnutrition, Renal  Fluids per team  Calories: 7127-4188  Protein: 40-45g (0.8g/kg)- Adjusted for CKD stage IV not on dialysis   Fluid: Per nephrology     Subjective:   89yo F PMH HTN HLD, CKD (baseline Cr ~3), hypothyroidism, COPD, Depression, Gout, R heel ulcer presented 5/23 w/ nonbloody diarrhea, found to be septic and positive for coronavirus and influenza A, as well as GI PCR+ for campylobacter, course further c/b staph bacteremia (?contaminant), now complicated by COPD exacerbation with RRT, increase work of breathing on HFNC 5/26. Stepped up to MICU for closer respiratory monitoring. Currently on BIPAP 100% spO2. Nephrology following. Palliative care consult d/t advanced age.     Previous Nutrition Diagnosis: Increased Nutrient Needs RT increased demands AEB sepsis, pressure ulcers, malnutrition    Active [  x ]  Resolved [   ]    If resolved, new PES:     Goal: Pt will meet at least 75% of nutrient needs via feasible route    Recommendations:  1. Recommend change diet to protein restricted d/t CKD stage IV not on dialysis   -CHO consistent restriction   2. Add Suplena once daily d/t fair po intake   3. Monitor labs and BMs   -Can give Banatrol 1-2x per day mix in applesauce, yogurt to help slow loose stools     Education: Deferred    Risk Level: High [ x  ] Moderate [   ] Low [   ]

## 2022-05-26 NOTE — CONSULT NOTE ADULT - CONSULT REASON
Pain/Symptom Management and Complex Medical Decision Making/GOC in the setting of Critical Illness
hypoxia
CKD Stage V
Rehab evaluation

## 2022-05-26 NOTE — PROGRESS NOTE ADULT - SUBJECTIVE AND OBJECTIVE BOX
Patient is a 89y Female seen and evaluated at bedside this morning; RRT this morning to MICU for increased work of breathing. Stable on BiPAP      Meds:  acetaminophen     Tablet .. 650 every 6 hours PRN  acetylcysteine 20%  Inhalation 4 every 4 hours  albuterol/ipratropium for Nebulization 3 every 4 hours  atorvastatin 40 at bedtime  chlorhexidine 2% Cloths 1 <User Schedule>  heparin   Injectable 5000 every 8 hours  levothyroxine 75 daily  melatonin 3 at bedtime PRN  methylPREDNISolone sodium succinate Injectable 40 every 24 hours  pantoprazole    Tablet 40 before breakfast      T(C): , Max: 37.6 (05-26-22 @ 09:00)  T(F): , Max: 99.7 (05-26-22 @ 09:00)  HR: 92 (05-26-22 @ 11:00)  BP: 118/72 (05-26-22 @ 11:00)  BP(mean): 89 (05-26-22 @ 11:00)  RR: 25 (05-26-22 @ 11:00)  SpO2: 100% (05-26-22 @ 11:00)  Wt(kg): --    05-25 @ 07:01  -  05-26 @ 07:00  --------------------------------------------------------  IN: 0 mL / OUT: 496 mL / NET: -496 mL    05-26 @ 07:01  -  05-26 @ 12:13  --------------------------------------------------------  IN: 0 mL / OUT: 65 mL / NET: -65 mL          Review of Systems:  ROS negative except as per HPI      PHYSICAL EXAM:  GENERAL: NAD on BiPAP  NECK: supple, No JVD  CHEST/LUNG: b/l wheezing appreciate  HEART: tachycardic no m/r/g  ABDOMEN: Soft, Nontender, +BS, No flank tenderness bilateral  EXTREMITIES: cold; mottled appearing    LABS:                        11.3   8.93  )-----------( 187      ( 26 May 2022 06:12 )             38.0     05-26    134<L>  |  101  |  39<H>  ----------------------------<  189<H>  5.1   |  17<L>  |  2.83<H>    Ca    8.6      26 May 2022 06:11  Phos  4.0     05-26  Mg     1.9     05-26    TPro  6.5  /  Alb  3.0<L>  /  TBili  0.5  /  DBili  x   /  AST  73<H>  /  ALT  30  /  AlkPhos  149<H>  05-26                RADIOLOGY & ADDITIONAL STUDIES:

## 2022-05-26 NOTE — CONSULT NOTE ADULT - TIME BILLING
Emotional Support/Supportive Care and Clarification of Potential Disease Trajectory related to respiratory failure  Exploration of GOC including advanced directives such as HCP designation and code status
case complexity as above

## 2022-05-26 NOTE — CONSULT NOTE ADULT - ASSESSMENT
89F with pmhx of CKD V (baseline Cr 3.5), HTN, depression who is presenting for 2-3 days of diarrhea and URI like symptoms. Nephrology consulted for CKD management and acidosis    Assessment/Plan:     #CKD Stage V (Baseline Cr 3.5)  #Metabolic acidosis w/ Respiratory acidosis  Pt presenting to the ED in setting of not feeling well. She has been having fever and chills with diarrhea. Has a known hx of chronic respiratory acidosis. pH now found to be 7.24, likely in the setting of bicarb loss from diarrhea. May have underlying COPD that has not been worked up being treated. BP noted to be in low 100's.   -Trend BMP daily  -Agree with sodium bicarbonate gtt to run at 100cc/hr; expect acidosis to resolve with Diarrhea resolving  -Avoid nephrotoxic meds  -Monitor I/O's  -Renal diet    Tonya Sims D.O.  PGY 4 - Nephrology Fellow  [phone]
per Internal Medicine    89 y o F PMH HTN HLD, CKD (baseline Cr ~3), hypothyroidism, COPD, Depression, Gout, R heel ulcer presented 5/23 w/ nonbloody diarrhea, found to be septic and positive for coronavirus and influenza A, found with campylobacter    Problem/Plan - 1:  ·  Problem: Severe sepsis.   ·  Plan: Met 3/4 SIRS criteria on admission (T 101.9, , RR 22) w/ lactate 2.1-->3.0 w/ +coronavirus (not COVID-19). Sepsis suspected 2/2 severe dehydration from poor PO intake i/s/o coronavirus infection along with fluid loss via diarrhea and vomiting. Slightly elevated procal (0.42) though w/o leukocytosis or bandemia and no focal consolidation on CXR. Abdominal pain and diarrhea likely related to underlying coronavirus infection but cannot r/o independent GI infection. UA positive although patient denies any urinary symptoms. S/p 3.1L NS, zosyn 3.375g x1 and tylenol 975mg suppository in the ED.    - f/u BCx  - treat viral respiratory illness and campylobacter as below.    Problem/Plan - 2:  ·  Problem: Acute respiratory failure with hypoxia.   ·  Plan: Presented w/ tachypnea necessitating NRB then BIPAP for increased work of breathing and lethargy. Suspected to be 2/2 URI as above. On further assessment, able to tolerate NC, currently on 5L satting ~96%. Appears to desat while sleeping, possibly related to undiagnosed RAKAN? thus requiring additional NC. Only requires 3L when awake. Also possible underlying COPD; patient denies this but takes Symbicort 160mcg-4.5mcg at home per chart review. Slightly decreased breath sounds in ADRIANA although no iWOB, cough or wheezing, and CXR w/o signs of consolidation.    - treat coronavirus and influenza A as below  - wean O2 as tolerated.    Problem/Plan - 3:  ·  Problem: Upper respiratory infection.   ·  Plan: Endorses cough for past 2-3 days and RVP+ for coronavirus and influenza A (not COVID19).  concern for COPD exacerbation     - symptomatic management w/ IVF resuscitation, tylenol, encouraging PO intake and supplemental O2 PRN.    Problem/Plan - 4:  ·  Problem: Campylobacter diarrhea.   ·  Plan: Presented w/ 2-3d h/o nonbloody diarrhea. Likely 2/2 underlying coronavirus infection although cannot r/o independent GI infection. Pt is diffusely tender to palpation without rebound tenderness on abdominal exam.    - c/w zithromax 500 mg qd x3d  - c/w maintenance IVF LR 80cc/h.    Problem/Plan - 5:  ·  Problem: Acute UTI.   ·  Plan: UA + for nitrites, LE, WBC, blood, bacteria although patient w/o any symptoms and no WBC elevation, and renal epithelial cells in UA as well. Becerra was placed in the ED, draining dark yellow urine.    - c/w CTX 1g qd for 3d.    Problem/Plan - 6:  ·  Problem: HTN (hypertension).   ·  Plan: Known h/o HTN, takes Valsartan 320 mg qd, furosemide 20 mg qd    - holding BP meds i/s/o sepsis and severe hypotension  - official med rec then reintroduce meds when appropriate      #HLD  Known h/o HLD, takes rosuvastatin 10mg qhs at home.  - c/w atorvastatin 40 mg qhs.    Problem/Plan - 7:  ·  Problem: CKD (chronic kidney disease).   ·  Plan: Presented with Cr 3.22 which appears to be close to baseline. Currently producing urine.    - monitor Cr  - avoid nephrotoxic medications.    Problem/Plan - 8:  ·  Problem: Heel ulceration.   ·  Plan: Has known chronic R heel ulceration. Follows with Dr. Oliva (surgery). Currently wrapped in clean kerlix.    - wound care nurse consult while inpatient.    Problem/Plan - 9:  ·  Problem: Hypothyroidism.   ·  Plan: - c/w synthroid 75 mcg qd.    Problem/Plan - 10:  ·  Problem: COPD, severity to be determined.   ·  Plan; H/o COPD. Patient denies this or any smoking history although per chart review patient has diagnosis. Per outpatient med review pt appears to be on Symbicort 160mcg-4.5mcg 1 puff BID at home.    - Monitor  - obtain collateral    H/o depression though denies this diagnosis. Per chart review patient has been rx'd Duloxetine DR 30mg qd, Desipramine 10mg qd    - obtain collateral from [facility] and/or PCP.  
89yo F with PMHx HTN HLD, CKD (baseline Cr ~3), hypothyroidism, COPD, Depression, Gout, R heel ulcer who presents with a chief complaint of nonbloody diarrhea, found to be septic and positive for coronavirus (not COVID19).  Admitted to [facility] for persistent hypotension and lactate likely 2/2 dehydration.  
89yo F PMH CKD (Cr 3.92 3/22), HTN, hypothyroidism, depression, gout, R heel ulcer presenting for 2-3 days of diarrhea and found to have multifactorial respiratory failure. Palliative consulted for complex medical decision making in the setting of critical illness.    ·	introduced the role of Palliative Medicine for symptom management, advance care planning, and emotional support  ·	discussed code status with , he has not had previous discussions with the patient  ·	ordered IV Tylenol for generalized pain

## 2022-05-26 NOTE — CONSULT NOTE ADULT - SUBJECTIVE AND OBJECTIVE BOX
[facility] Geriatrics and Palliative Care  Camden Magallanes, Palliative Care Attending  Contact Info: Call [phone] (HEAL Line) or message on Microsoft Teams (Camden Magallanes)    HPI:  89yo F PMH CKD (Cr 3.92 3/22), HTN, hypothyroidism, depression, gout, R heel ulcer presenting for 2-3 days of diarrhea. States she has been having approx 3 watery bms/day, which is not the norm for her. Diarrhea is not associated with abdominal pain and is not bloody. Also endorses a few episodes of emesis, also nonbloody. States she took an antidiarrheal medication with minimal relief. States her  was diagnosed with the flu 2 days ago and that she has had a dry, nonproductive cough for the past 2 days as well. States she has been eating and drinking less while she has felt sick. Denies recent travel or antibiotic use. Denies CP, SOB, wheezing, nausea, fevers, abdominal pain, back pain, changes to urinary habits.    In the ED:  VS: T 101.9, , BP 98/48, RR 22, SpO2 95% on NRB 10L/min  Labs significant for: lactate 2.1 --> 3.0, procal 0.42, K 5.1, Cl 95, BUN/Cr 57/3.22; VBG pH 7.34 / pCO2 57 / pO2 39; UA w/ +bacteria, many wbcs, +nitrites, large leuk esterase, +protein; +Coronavirus, -COVID-19  EKG: sinus tachycardia , 1st degree heart block, TWI in III, ?aVL  CXR: poor inspiratory effort, no consolidation  Interventions: acetaminophen 975mg suppository x1, zosyn 3.375g x1, 2.1L NS, duoneb x1  Consults: ICU (23 May 2022 05:06)    PERTINENT PM/SXH:   Chronic kidney disease, stage IV (severe)    Other specified hypothyroidism    Essential hypertension    Urinary retention    Depression    Gout      H/O abdominal hysterectomy    History of cholecystectomy    History of hip surgery      FAMILY HISTORY:  No pertinent family history in first degree relatives    No history of  in first degree relatives according to chart  Unable to obtain due to patient's encephalopathy    ITEMS NOT CHECKED ARE NOT PRESENT  SOCIAL HISTORY:   Significant other/partner:  []  Children:  []   Substance hx:  []   Tobacco hx:  []   Alcohol hx: []   Home Opioid hx:  [] I-Stop Reference No:  - no active Rx's / see chart note  Living Situation: []Home  []Long term care  []Rehab []Other  Religious/Spiritual practice: ; Role of organized Buddhism [ ] important [ ] some [ ] unable to assess  Coping: [ ] well [ ] with difficulty [ ] poor coping [ ] unable to assess  Support system: [ ] strong [ ] adequate [ ] inadequate    ADVANCE DIRECTIVES:    []MOLST  []Living Will  DECISION MAKER(s):  [] Health Care Proxy(s)  [] Surrogate(s)  [] Guardian           Name(s)/Phone Number(s):     BASELINE (I)ADLs (prior to admission):  [place]: []Total  [] Moderate []Dependent    ALLERGIES:  No Known Allergies    MEDICATIONS  (STANDING):  acetylcysteine 20%  Inhalation 4 milliLiter(s) Inhalation every 4 hours  albuterol/ipratropium for Nebulization 3 milliLiter(s) Nebulizer every 4 hours  atorvastatin 40 milliGRAM(s) Oral at bedtime  chlorhexidine 2% Cloths 1 Application(s) Topical <User Schedule>  heparin   Injectable 5000 Unit(s) SubCutaneous every 8 hours  levothyroxine Injectable 60 MICROGram(s) IV Push at bedtime  methylPREDNISolone sodium succinate Injectable 40 milliGRAM(s) IV Push every 24 hours  pantoprazole  Injectable 40 milliGRAM(s) IV Push every 24 hours    MEDICATIONS  (PRN):  acetaminophen     Tablet .. 650 milliGRAM(s) Oral every 6 hours PRN Temp greater or equal to 38C (100.4F), Mild Pain (1 - 3)  melatonin 3 milliGRAM(s) Oral at bedtime PRN Insomnia    Analgesic Use (Scheduled and PRNs) for past 24 hours:    acetaminophen   IVPB ..   400 mL/Hr IV Intermittent (05-26-22 @ 13:19)      PRESENT SYMPTOMS/REVIEW OF SYSTEMS: []Unable to obtain due to poor mentation/encephalopathy  Source if other than patient:  []Family   []Team     Pain: [ ] yes [ ] no  QOL Impact -   Location -                    Aggravating Factors -  Quality -  Radiation -  Timing -  Severity (0-10 scale) -   Minimal Acceptable Level (0-10 scale) -    CPOT Score:  (Pain Assessment Scale for Critically Ill)    PAIN AD Score:  (Nonverbal Pain Assessment Scale)    Dyspnea:                           []Mild  []Moderate []Severe  Anxiety:                             []Mild []Moderate []Severe  Fatigue:                             []Mild []Moderate []Severe  Nausea:                             []Mild []Moderate []Severe  Loss of Appetite:              []Mild []Moderate []Severe  Constipation:                    []Mild []Moderate []Severe    Other Symptoms:  [x]All Other Review Of Systems Negative     Palliative Performance Status Version 2:  %  (Functional Assessment Tool)    PHYSICAL EXAM:  GENERAL:  []Alert  []Oriented x   []Lethargic  []Cachexia  []Unarousable  []Verbal  []Non-Verbal  Behavioral:   []Anxiety  []Delirium []Agitation []Cooperative  HEENT:  []Normal   []Dry mouth   []ET Tube/Trach  []Oral lesions  PULMONARY:   []Clear []Tachypnea  []Audible excessive secretions   []Rhonchi        []Right []Left []Bilateral  []Crackles        []Right []Left []Bilateral  []Wheezing     []Right []Left []Bilateral  CARDIOVASCULAR:    []Regular []Irregular []Tachy  []Miko []Murmur []Other  GASTROINTESTINAL:  []Soft  []Distended   []+BS  []Non tender []Tender  []PEG []OGT/ NGT  Last BM:  GENITOURINARY:  []Normal [] Incontinent   []Oliguria/Anuria   []Becerra  MUSCULOSKELETAL:   []Normal   []Weakness  []Bed/Wheelchair bound []Edema  NEUROLOGIC:   []No focal deficits  []Cognitive impairment  []Dysphagia []Dysarthria []Paresis []Encephalopathic   SKIN:   []Normal   []Pressure ulcer(s)  []Rash    CRITICAL CARE:  [ ]Shock Present  [ ]Septic [ ]Cardiogenic [ ]Neurologic [ ]Hypovolemic  [ ]Vasopressors [ ]Inotropes   [ ]Respiratory failure present [ ]Mechanical Ventilation [ ]Non-invasive ventilatory support [ ]High-Flow  [ ]Acute  [ ]Chronic [ ]Hypoxic  [ ]Hypercarbic  [ ]Other organ failure    Vital Signs Last 24 Hrs  T(C): 37.6 (26 May 2022 09:00), Max: 37.6 (26 May 2022 09:00)  T(F): 99.7 (26 May 2022 09:00), Max: 99.7 (26 May 2022 09:00)  HR: 87 (26 May 2022 17:00) (56 - 115)  BP: 128/77 (26 May 2022 17:00) (104/56 - 189/113)  BP(mean): 97 (26 May 2022 17:00) (74 - 117)  RR: 19 (26 May 2022 17:00) (18 - 47)  SpO2: 100% (26 May 2022 17:00) (83% - 100%)    LABS:                        11.3   8.93  )-----------( 187      ( 26 May 2022 06:12 )             38.0   05-26    134<L>  |  101  |  39<H>  ----------------------------<  189<H>  5.1   |  17<L>  |  2.83<H>    Ca    8.6      26 May 2022 06:11  Phos  4.0     05-26  Mg     1.9     05-26    TPro  6.5  /  Alb  3.0<L>  /  TBili  0.5  /  DBili  x   /  AST  73<H>  /  ALT  30  /  AlkPhos  149<H>  05-26    RADIOLOGY & ADDITIONAL STUDIES:      PROTEIN CALORIE MALNUTRITION PRESENT: [ ]mild [ ]moderate [ ]severe [ ]underweight [ ]morbid obesity  []PPSV2 < or = to 30% []significant weight loss  []poor nutritional intake []catabolic state []anasarca     Artificial Nutrition []     REFERRALS:  [x]Social Work  []Case management []PT/OT []Chaplaincy  []Hospice  []Patient/Family Support []Massage Therapy    DISCUSSION OF CASE: Family - to obtain additional history and to provide emotional support; ( ) -     Care Coordination/Goals of Care Document:                                          Progress Notes    PROGRESS NOTE  Date & Time of Note   2022-05-26 03:15    Notes    Notes: Patient is a trigger for geriatric and palliative medicine. Palliative  Sw met with patient and private aid Yuliana [phone] bedside. Palliative  SW role explained and contact information provided. Patient is very pleasant  and easy to engage. Patient lives with second  of 34 years. Patient has  2 adult children, daughter lives in [place] and estranged from son. Patient also has  adult grandchildren who are available and supportive. Patient's aid is primary  caretaker and also takes care of patient's , a retired DMD with memory  issues. Patient needs assistance with ADLs but prior to admission was out to  dinner every evening and involved socially. Patient and aid stated that  [place]/rehab is "out of the question". Patient has 24 hr private care and can get  PT at home if needed. Plan is home with home PT when medically ready. Patient  and aid also requesting a hospital bed, CM is aware. Palliative team will  continue to provide support, assist with symptom management and remain  available.       Electronically signed by:  Júnior Murrell  Electronically signed on:  2022-05-26  15:50           PALLIATIVE MEDICINE COORDINATION OF CARE DOCUMENTATION: [x] Inpatient Consult  Non-Face-to-Face prolonged service provided that relates to (face-to-face) care that has or will occur and ongoing patient management, including one or more of the following: - Reviewed documentation from other physicians and other health care professional services - Reviewed medical records and diagnostic / radiology study results - Coordination with patient's support system  ************************************************************************  MEDICATION REVIEW:  - See Medication List Above    ISTOP REFERENCE:   - no active Rx's / see ISTOP Chart Note  - PRN usage: NO PRN'S  ------------------------------------------------------------------------  COORDINATION OF CARE:  - Palliative Care consulted for: GOC / Symptom Management  - Patient (to be) assessed:  - Patient previously seen by Palliative Care service: NO    ADVANCE CARE PLANNING  - Code status:  - MOLST reviewed in chart: NONE; None found on [facility]  - HCP/Surrogate:  - GOC documents: NONE found on [place]  - HCP/Living will/Other Advanced Directives in Alpha: NONE found on Alpha  ------------------------------------------------------------------------  CARE PROVIDER DOCUMENTATION:  - SW/CM notes: Remains medically active    PLAN OF CARE  - Known Admissions in Past Year:  - Current Admit Date:  - LOS:  - LACE score:   - Current Dispo Plan: TO BE DETERMINED    05-23-22 (3d)  ------------------------------------------------------------------------  - Time Spent/Chart reviewed: 31 Minutes [including time used to gather, review and transfer data]  - Start:  - End:    Prolonged services rendered, as part of this patient's care provided by Palliative Medicine, include: i. chart review for provider and ancillary service documentation, ii. pertinent diagnostics including laboratory and imaging studies, iii. medication review including PRN use, iv. admission history including previous palliative care encounters and GOC notes, v. advance care planning documents including HCP and MOLST forms in Alpha. Part of Palliative Medicine extended evaluation and management also involves coordination of care with our IDT, the primary and consulting teams, and unit CM/SW and Hospice if eligible. Recommendations based on the information gathered and discussed are outlined in the A/P of Palliative notes. [facility] Geriatrics and Palliative Care  Camden Magallanes, Palliative Care Attending  Contact Info: Call [phone] (HEAL Line) or message on Microsoft Teams (Camden Magallanes)    HPI:  89yo F PMH CKD (Cr 3.92 3/22), HTN, hypothyroidism, depression, gout, R heel ulcer presenting for 2-3 days of diarrhea. States she has been having approx 3 watery bms/day, which is not the norm for her. Diarrhea is not associated with abdominal pain and is not bloody. Also endorses a few episodes of emesis, also nonbloody. States she took an antidiarrheal medication with minimal relief. States her  was diagnosed with the flu 2 days ago and that she has had a dry, nonproductive cough for the past 2 days as well. States she has been eating and drinking less while she has felt sick. Denies recent travel or antibiotic use. Denies CP, SOB, wheezing, nausea, fevers, abdominal pain, back pain, changes to urinary habits.    Patient seen and examined at bedside. Appears overtly comfortable. Denies any significant complaint. Comprehensive symptom assessment and GOC exploration as noted below. Further collateral obtained from primary team, chart, and . Patient remains on BIPAP. Discussed with aide.    PERTINENT PM/SXH:   Chronic kidney disease, stage IV (severe)  Other specified hypothyroidism  Essential hypertension  Urinary retention  Depression  Gout  H/O abdominal hysterectomy  History of cholecystectomy  History of hip surgery    FAMILY HISTORY:  No history of respiratory failure in first degree relatives    ITEMS NOT CHECKED ARE NOT PRESENT  SOCIAL HISTORY:   Significant other/partner:  [x]  Children:  []   Substance hx:  []   Tobacco hx:  []   Alcohol hx: []   Living Situation: [x]Home  []Long term care  []Rehab []Other  Latter day/Spiritual practice: ; Role of organized Caodaism [ ] important [ ] some [ ] unable to assess  Coping: [x] well [ ] with difficulty [ ] poor coping [ ] unable to assess  Support system: [x] strong [ ] adequate [ ] inadequate    ADVANCE DIRECTIVES:    []MOLST  []Living Will  DECISION MAKER(s):  [x] Health Care Proxy(s)  [] Surrogate(s)  [] Guardian           Name(s)/Phone Number(s):     BASELINE (I)ADLs (prior to admission):  [place]: []Total  [x] Moderate []Dependent    ALLERGIES:  No Known Allergies    MEDICATIONS  (STANDING):  acetylcysteine 20%  Inhalation 4 milliLiter(s) Inhalation every 4 hours  albuterol/ipratropium for Nebulization 3 milliLiter(s) Nebulizer every 4 hours  atorvastatin 40 milliGRAM(s) Oral at bedtime  chlorhexidine 2% Cloths 1 Application(s) Topical <User Schedule>  heparin   Injectable 5000 Unit(s) SubCutaneous every 8 hours  levothyroxine Injectable 60 MICROGram(s) IV Push at bedtime  methylPREDNISolone sodium succinate Injectable 40 milliGRAM(s) IV Push every 24 hours  pantoprazole  Injectable 40 milliGRAM(s) IV Push every 24 hours    MEDICATIONS  (PRN):  acetaminophen     Tablet .. 650 milliGRAM(s) Oral every 6 hours PRN Temp greater or equal to 38C (100.4F), Mild Pain (1 - 3)  melatonin 3 milliGRAM(s) Oral at bedtime PRN Insomnia    Analgesic Use (Scheduled and PRNs) for past 24 hours:  acetaminophen   IVPB ..   400 mL/Hr IV Intermittent (05-26-22 @ 13:19)    PRESENT SYMPTOMS/REVIEW OF SYSTEMS: []Unable to obtain due to poor mentation/encephalopathy  Source if other than patient:  []Family   []Team     Pain: [x] yes [ ] no  QOL Impact - bothersome  Location - feet, pelvis                 Aggravating Factors -  Quality -  Radiation -  Timing - constant  Severity (0-10 scale) - 5  Minimal Acceptable Level (0-10 scale) -    CPOT Score: 2  (Pain Assessment Scale for Critically Ill)    Dyspnea:                           []Mild  []Moderate []Severe  Anxiety:                             []Mild []Moderate []Severe  Fatigue:                             []Mild []Moderate []Severe  Nausea:                             []Mild []Moderate []Severe  Loss of Appetite:              []Mild []Moderate []Severe  Constipation:                    []Mild []Moderate []Severe    Other Symptoms:  [x]All Other Review Of Systems Negative     Palliative Performance Status Version 2:  50%  (Functional Assessment Tool)    PHYSICAL EXAM:  GENERAL:  [x]Alert  [x]Oriented x3   []Lethargic  []Cachexia  []Unarousable  [x]Verbal  []Non-Verbal  Behavioral:   []Anxiety  []Delirium []Agitation [x]Cooperative  HEENT:  [x]Normal   []Dry mouth   []ET Tube/Trach  []Oral lesions  PULMONARY:   []Clear []Tachypnea  []Audible excessive secretions   [x]Rhonchi        []Right []Left [x]Bilateral  []Crackles        []Right []Left []Bilateral  []Wheezing     []Right []Left []Bilateral  CARDIOVASCULAR:    [x]Regular []Irregular []Tachy  []Miko []Murmur []Other  GASTROINTESTINAL:  [x]Soft  []Distended   [x]+BS  [x]Non tender []Tender  []PEG []OGT/ NGT  Last BM:  GENITOURINARY:  [x]Normal [] Incontinent   []Oliguria/Anuria   []Becerra  MUSCULOSKELETAL:   []Normal   [x]Weakness  []Bed/Wheelchair bound []Edema  NEUROLOGIC:   []No focal deficits  [x]Cognitive impairment  []Dysphagia []Dysarthria []Paresis []Encephalopathic   SKIN:   [x]Normal   []Pressure ulcer(s)  []Rash    CRITICAL CARE:  [ ]Shock Present  [ ]Septic [ ]Cardiogenic [ ]Neurologic [ ]Hypovolemic  [ ]Vasopressors [ ]Inotropes   [x]Respiratory failure present [ ]Mechanical Ventilation [ ]Non-invasive ventilatory support [ ]High-Flow  [x]Acute  [ ]Chronic [x]Hypoxic  [ ]Hypercarbic  [ ]Other organ failure    Vital Signs Last 24 Hrs  T(C): 37.6 (26 May 2022 09:00), Max: 37.6 (26 May 2022 09:00)  T(F): 99.7 (26 May 2022 09:00), Max: 99.7 (26 May 2022 09:00)  HR: 87 (26 May 2022 17:00) (56 - 115)  BP: 128/77 (26 May 2022 17:00) (104/56 - 189/113)  BP(mean): 97 (26 May 2022 17:00) (74 - 117)  RR: 19 (26 May 2022 17:00) (18 - 47)  SpO2: 100% (26 May 2022 17:00) (83% - 100%)    LABS:                        11.3   8.93  )-----------( 187      ( 26 May 2022 06:12 )             38.0   05-26    134<L>  |  101  |  39<H>  ----------------------------<  189<H>  5.1   |  17<L>  |  2.83<H>    Ca    8.6      26 May 2022 06:11  Phos  4.0     05-26  Mg     1.9     05-26    TPro  6.5  /  Alb  3.0<L>  /  TBili  0.5  /  DBili  x   /  AST  73<H>  /  ALT  30  /  AlkPhos  149<H>  05-26    RADIOLOGY & ADDITIONAL STUDIES:  < from: Xray Chest 1 View-PORTABLE IMMEDIATE (Xray Chest 1 View-PORTABLE IMMEDIATE .) (05.26.22 @ 22:50) >  impression: Bilateral opacities, decreased. Stable heart size, thoracic   aortic calcification. Stable bony structures.    REFERRALS:  [x]Social Work  []Case management []PT/OT []Chaplaincy  []Hospice  []Patient/Family Support []Massage Therapy    DISCUSSION OF CASE:  - to obtain additional history and to provide emotional support     Care Coordination/Goals of Care Document:   Patient assessed on 05-26-22 to manage: GOC/Advanced Directives, Symptoms, and Supportive Care. 31 Minutes; Start: 9:00AM ,End: 9:31AM, Non-Face-to-Face prolonged service provided that relates to Face-to-Face care that has occurred and ongoing patient management, including one or more of the following: - Reviewed documentation from other physicians and other health care professional services - Reviewed other medical records and diagnostic / radiology study results - Coordination with patient's support system

## 2022-05-27 NOTE — PROGRESS NOTE ADULT - CRITICAL CARE ATTENDING COMMENT
Acute hypoxemic respiratory failure 2/2 non SARS COV2 coronavirus, influenza A c/b campylobacter diarrhea. Returned to MICU with worsening respiratory status and required intubation overnight. Now with leukocytosis and fevers; will add antibiotics. Daily pressure support trials. Acute hypoxemic respiratory failure 2/2 non SARS COV2 coronavirus, influenza A c/b campylobacter diarrhea. Returned to MICU with worsening respiratory status and required intubation overnight. Severe sepsis with leukocytosis, fevers, and WAI on CKD; will add antibiotics. Daily pressure support trials.

## 2022-05-27 NOTE — PROGRESS NOTE ADULT - ASSESSMENT
89F with pmhx of CKD V (baseline Cr 3.5), HTN, depression who is presenting for 2-3 days of diarrhea and URI like symptoms. Nephrology consulted for CKD management and acidosis    Assessment/Plan:   #CKD Stage V (Baseline Cr 3.5)  #Metabolic acidosis w/ Respiratory acidosis (Resolved)  Creatinine remains stable within her baseline  -Trend BMP daily  -Agree with restarting lasix  -Avoid nephrotoxic meds  -Monitor I/O's  -Renal diet    Tonya Sims D.O.  PGY 4 - Nephrology Fellow  [phone]

## 2022-05-27 NOTE — PROGRESS NOTE ADULT - SUBJECTIVE AND OBJECTIVE BOX
Patient is a 89y Female seen and evaluated at bedside; remains stable. Off pressors. BP noted in 90's. Cr rise to 3.18 from 2.8~. Likely from hemodynamic shift      Meds:    acetaminophen     Tablet .. 650 every 6 hours PRN  acetylcysteine 20%  Inhalation 4 every 4 hours  albuterol/ipratropium for Nebulization 3 every 4 hours  atorvastatin 40 at bedtime  chlorhexidine 2% Cloths 1 <User Schedule>  dexMEDEtomidine Infusion 0.2 <Continuous>  heparin   Injectable 5000 every 8 hours  levothyroxine Injectable 60 at bedtime  melatonin 3 at bedtime PRN  methylPREDNISolone sodium succinate Injectable 40 every 24 hours  metoprolol tartrate 12.5 every 12 hours  pantoprazole  Injectable 40 every 24 hours  piperacillin/tazobactam IVPB.. 4.5 every 12 hours  propofol Infusion 5 <Continuous>  valsartan 80 every 24 hours      T(C): , Max: 38.1 (22 @ 01:04)  T(F): , Max: 100.6 (22 @ 01:04)  HR: 98 (22 @ 11:19)  BP: 124/70 (22 @ 11:14)  BP(mean): 89 (22 @ 11:14)  RR: 23 (22 @ 11:19)  SpO2: 100% (22 @ 11:19)  Wt(kg): --     @ 07:01  -   @ 07:00  --------------------------------------------------------  IN: 418.7 mL / OUT: 508 mL / NET: -89.3 mL     @ 07:01  -   @ 12:12  --------------------------------------------------------  IN: 391.7 mL / OUT: 190 mL / NET: 201.7 mL          Review of Systems:  ROS negative except as per HPI      PHYSICAL EXAM:  GENERAL: Intubated  NECK: supple, No JVD  CHEST/LUNG: b/l wheezing appreciate  HEART: tachycardic no m/r/g  ABDOMEN: Soft, Nontender, +BS, No flank tenderness bilateral  EXTREMITIES: cool, dry no edema appreciated      LABS:                        9.8    21.94 )-----------( 201      ( 27 May 2022 04:12 )             32.5         139  |  106  |  41<H>  ----------------------------<  136<H>  4.6   |  21<L>  |  3.10<H>    Ca    8.5      27 May 2022 04:12  Phos  4.0       Mg     1.9         TPro  6.5  /  Alb  3.0<L>  /  TBili  0.5  /  DBili  x   /  AST  73<H>  /  ALT  30  /  AlkPhos  149<H>          Urinalysis Basic - ( 27 May 2022 03:54 )    Color: Yellow / Appearance: Cloudy / S.015 / pH: x  Gluc: x / Ketone: NEGATIVE  / Bili: Negative / Urobili: 0.2 E.U./dL   Blood: x / Protein: 30 mg/dL / Nitrite: NEGATIVE   Leuk Esterase: NEGATIVE / RBC: 5-10 /HPF / WBC 5-10 /HPF   Sq Epi: x / Non Sq Epi: 0-5 /HPF / Bacteria: Present /HPF      Sodium, Random Urine: 39 mmol/L ( @ 03:54)  Osmolality, Random Urine: 284 mosm/kg ( @ 03:54)  Creatinine, Random Urine: 65 mg/dL ( @ 03:54)        RADIOLOGY & ADDITIONAL STUDIES:

## 2022-05-27 NOTE — PROCEDURE NOTE - NSICDXPROCEDURE_GEN_ALL_CORE_FT
PROCEDURES:  Insertion of intravenous catheter with ultrasound guidance 26-May-2022 04:13:56  Andrea Carlisle  
PROCEDURES:  Insertion, needle, vein 26-May-2022 23:00:39  Charlene Rodriguez  US guided vascular access 26-May-2022 23:00:44  Charlene Rodriguez  
PROCEDURES:  Insertion, tube, orogastric 27-May-2022 03:11:51  Charlene Rodriguez  
PROCEDURES:  Insertion, needle, vein 26-May-2022 23:00:39  Charlene Rodriguez  Arterial blood gas 27-May-2022 00:53:15  Charlene Rodriguez

## 2022-05-27 NOTE — CHART NOTE - NSCHARTNOTEFT_GEN_A_CORE
Discussion with patient at bedside at 11AM while CPAPing. Patient awake, alert and able to participate in decision making. Patient was planned for medical extubation. In discussion with patient, describes not wanting the tube to be replaced if respiratory status declined, but rather would want oxygen and medications to assist with comfort. Also would not want CPR at the end of life if clinical course deteriorated. This was also discussed with patients' , Dr. Mi, who agreed with those decisions. MOLST form filled out. Depending on clinical course, patient also with a noted EF of 10%, which would qualify patient for home hospice services on discharge. Ongoing GOC discussions. Palliative Care.    Referral/Consultation:    Initial Consult:  · Requested by Name:	Sejal Salazar  · Date/Time:	26-May-2022 18:34  · Reason for Referral/Consultation:	Pain/Symptom Management and Complex Medical Decision Making/GOC in the setting of Critical Illness      · Subjective and Objective:   [facility]  Camden Magallanes, Palliative Care Attending  Contact Info: Call [phone] (HEAL Line) or message on Microsoft Teams (Camden Magallanes)    HPI:  89yo F PMH CKD (Cr 3.92 3/22), HTN, hypothyroidism, depression, gout, R heel ulcer presenting for 2-3 days of diarrhea. States she has been having approx 3 watery bms/day, which is not the norm for her. Diarrhea is not associated with abdominal pain and is not bloody. Also endorses a few episodes of emesis, also nonbloody. States she took an antidiarrheal medication with minimal relief. States her  was diagnosed with the flu 2 days ago and that she has had a dry, nonproductive cough for the past 2 days as well. States she has been eating and drinking less while she has felt sick. Denies recent travel or antibiotic use. Denies CP, SOB, wheezing, nausea, fevers, abdominal pain, back pain, changes to urinary habits.    Patient seen and examined at bedside. Appears overtly comfortable. Denies any significant complaint. Comprehensive symptom assessment and GOC exploration as noted below. Further collateral obtained from primary team, chart, and . Patient remains on BIPAP. Discussed with aide.    PERTINENT PM/SXH:   Chronic kidney disease, stage IV (severe)  Other specified hypothyroidism  Essential hypertension  Urinary retention  Depression  Gout  H/O abdominal hysterectomy  History of cholecystectomy  History of hip surgery    FAMILY HISTORY:  No history of respiratory failure in first degree relatives    ITEMS NOT CHECKED ARE NOT PRESENT  SOCIAL HISTORY:   Significant other/partner:  [x]  Children:  []   Substance hx:  []   Tobacco hx:  []   Alcohol hx: []   Living Situation: [x]Home  []Long term care  []Rehab []Other  Presybeterian/Spiritual practice: ; Role of organized Moravian [ ] important [ ] some [ ] unable to assess  Coping: [x] well [ ] with difficulty [ ] poor coping [ ] unable to assess  Support system: [x] strong [ ] adequate [ ] inadequate    ADVANCE DIRECTIVES:    []MOLST  []Living Will  DECISION MAKER(s):  [x] Health Care Proxy(s)  [] Surrogate(s)  [] Guardian           Name(s)/Phone Number(s):     BASELINE (I)ADLs (prior to admission):  [place]: []Total  [x] Moderate []Dependent    ALLERGIES:  No Known Allergies    MEDICATIONS  (STANDING):  acetylcysteine 20%  Inhalation 4 milliLiter(s) Inhalation every 4 hours  albuterol/ipratropium for Nebulization 3 milliLiter(s) Nebulizer every 4 hours  atorvastatin 40 milliGRAM(s) Oral at bedtime  chlorhexidine 2% Cloths 1 Application(s) Topical <User Schedule>  heparin   Injectable 5000 Unit(s) SubCutaneous every 8 hours  levothyroxine Injectable 60 MICROGram(s) IV Push at bedtime  methylPREDNISolone sodium succinate Injectable 40 milliGRAM(s) IV Push every 24 hours  pantoprazole  Injectable 40 milliGRAM(s) IV Push every 24 hours    MEDICATIONS  (PRN):  acetaminophen     Tablet .. 650 milliGRAM(s) Oral every 6 hours PRN Temp greater or equal to 38C (100.4F), Mild Pain (1 - 3)  melatonin 3 milliGRAM(s) Oral at bedtime PRN Insomnia    Analgesic Use (Scheduled and PRNs) for past 24 hours:  acetaminophen   IVPB ..   400 mL/Hr IV Intermittent (22 @ 13:19)    PRESENT SYMPTOMS/REVIEW OF SYSTEMS: []Unable to obtain due to poor mentation/encephalopathy  Source if other than patient:  []Family   []Team     Pain: [x] yes [ ] no  QOL Impact - bothersome  Location - feet, pelvis                 Aggravating Factors -  Quality -  Radiation -  Timing - constant  Severity (0-10 scale) - 5  Minimal Acceptable Level (0-10 scale) -    CPOT Score: 2  (Pain Assessment Scale for Critically Ill)    Dyspnea:                           []Mild  []Moderate []Severe  Anxiety:                             []Mild []Moderate []Severe  Fatigue:                             []Mild []Moderate []Severe  Nausea:                             []Mild []Moderate []Severe  Loss of Appetite:              []Mild []Moderate []Severe  Constipation:                    []Mild []Moderate []Severe    Other Symptoms:  [x]All Other Review Of Systems Negative     Palliative Performance Status Version 2:  50%  (Functional Assessment Tool)    PHYSICAL EXAM:  GENERAL:  [x]Alert  [x]Oriented x3   []Lethargic  []Cachexia  []Unarousable  [x]Verbal  []Non-Verbal  Behavioral:   []Anxiety  []Delirium []Agitation [x]Cooperative  HEENT:  [x]Normal   []Dry mouth   []ET Tube/Trach  []Oral lesions  PULMONARY:   []Clear []Tachypnea  []Audible excessive secretions   [x]Rhonchi        []Right []Left [x]Bilateral  []Crackles        []Right []Left []Bilateral  []Wheezing     []Right []Left []Bilateral  CARDIOVASCULAR:    [x]Regular []Irregular []Tachy  []Miko []Murmur []Other  GASTROINTESTINAL:  [x]Soft  []Distended   [x]+BS  [x]Non tender []Tender  []PEG []OGT/ NGT  Last BM:  GENITOURINARY:  [x]Normal [] Incontinent   []Oliguria/Anuria   []Becerra  MUSCULOSKELETAL:   []Normal   [x]Weakness  []Bed/Wheelchair bound []Edema  NEUROLOGIC:   []No focal deficits  [x]Cognitive impairment  []Dysphagia []Dysarthria []Paresis []Encephalopathic   SKIN:   [x]Normal   []Pressure ulcer(s)  []Rash    CRITICAL CARE:  [ ]Shock Present  [ ]Septic [ ]Cardiogenic [ ]Neurologic [ ]Hypovolemic  [ ]Vasopressors [ ]Inotropes   [x]Respiratory failure present [ ]Mechanical Ventilation [ ]Non-invasive ventilatory support [ ]High-Flow  [x]Acute  [ ]Chronic [x]Hypoxic  [ ]Hypercarbic  [ ]Other organ failure    Vital Signs Last 24 Hrs  T(C): 37.6 (26 May 2022 09:00), Max: 37.6 (26 May 2022 09:00)  T(F): 99.7 (26 May 2022 09:00), Max: 99.7 (26 May 2022 09:00)  HR: 87 (26 May 2022 17:00) (56 - 115)  BP: 128/77 (26 May 2022 17:00) (104/56 - 189/113)  BP(mean): 97 (26 May 2022 17:00) (74 - 117)  RR: 19 (26 May 2022 17:00) (18 - 47)  SpO2: 100% (26 May 2022 17:00) (83% - 100%)    LABS:                        11.3   8.93  )-----------( 187      ( 26 May 2022 06:12 )             38.0       134<L>  |  101  |  39<H>  ----------------------------<  189<H>  5.1   |  17<L>  |  2.83<H>    Ca    8.6      26 May 2022 06:11  Phos  4.0       Mg     1.9         TPro  6.5  /  Alb  3.0<L>  /  TBili  0.5  /  DBili  x   /  AST  73<H>  /  ALT  30  /  AlkPhos  149<H>      RADIOLOGY & ADDITIONAL STUDIES:  < from: Xray Chest 1 View-PORTABLE IMMEDIATE (Xray Chest 1 View-PORTABLE IMMEDIATE .) (22 @ 22:50) >  impression: Bilateral opacities, decreased. Stable heart size, thoracic   aortic calcification. Stable bony structures.    REFERRALS:  [x]Social Work  []Case management []PT/OT []Chaplaincy  []Hospice  []Patient/Family Support []Massage Therapy    DISCUSSION OF CASE:  - to obtain additional history and to provide emotional support     Care Coordination/Goals of Care Document:   Patient assessed on 22 to manage: GOC/Advanced Directives, Symptoms, and Supportive Care. 31 Minutes; Start: 9:00AM ,End: 9:31AM, Non-Face-to-Face prolonged service provided that relates to Face-to-Face care that has occurred and ongoing patient management, including one or more of the following: - Reviewed documentation from other physicians and other health care professional services - Reviewed other medical records and diagnostic / radiology study results - Coordination with patient's support system    Assessment and Recommendation:   · Assessment	  89yo F PMH CKD (Cr 3.92 3/22), HTN, hypothyroidism, depression, gout, R heel ulcer presenting for 2-3 days of diarrhea and found to have multifactorial respiratory failure. Palliative consulted for complex medical decision making in the setting of critical illness.    introduced the role of Palliative Medicine for symptom management, advance care planning, and emotional support  discussed code status with , he has not had previous discussions with the patient  ordered IV Tylenol for generalized pain       Problem/Recommendation - 1:  ·  Problem: Respiratory Failure  ·  Recommendation: .  - Patient is awaiting extubation.   - Low dose opiates are recommended. Patient has expressed her wishes for not wanting a reintubation and wishing to be DNR. MOLST form filled out.  updated.      Problem/Recommendation - 2:  ·  Problem: Debility.   ·  Recommendation: .  PPSV = 30%, requires assistance for most ADLs  -PT Eval when appropriate  -c/w supportive care, OOB, encourage movement.     Problem/Recommendation - 3:  ·  Problem: Advanced care planning/counseling discussion.   ·  Recommendation: .  Patient is DNR/DNI  - is designated HCP  - Please refernce above documentation.    Advanced Care Plannin minutes spent with patient and . Both in agreement with DNR/DNI. MOLST form in chart.      Problem/Recommendation - 5:  ·  Problem: Encounter for palliative care.   ·  Recommendation: .  Complex medical decision making / symptom management in the setting of critical illness.Discussion with patient at bedside at 11AM while CPAPing. Patient awake, alert and able to participate in decision making. Patient was planned for medical extubation. In discussion with patient, describes not wanting the tube to be replaced if respiratory status declined, but rather would want oxygen and medications to assist with comfort. Also would not want CPR at the end of life if clinical course deteriorated. This was also discussed with patients' , Dr. Mi, who agreed with those decisions. MOLST form filled out. Depending on clinical course, patient also with a noted EF of 10%, which would qualify patient for home hospice services on discharge. Ongoing GOC discussions.

## 2022-05-27 NOTE — PROVIDER CONTACT NOTE (OTHER) - SITUATION
Pt noted tachycardiac with increased work of breathing Pt noted tachycardiac with increased work of breathing, lethargic   Pt given Ativan x2 and Dilaudid (see MAR) due to anxiety, restlessness, and c/o of pain to shoulders and chest.

## 2022-05-27 NOTE — PROCEDURE NOTE - NSPROCDETAILS_GEN_ALL_CORE
audible air bolus/placement confirmed by auscultation/orogastric
location identified, draped/prepped, sterile technique used/blood seen on insertion/dressing applied/flushes easily/secured in place
location identified, draped/prepped, sterile technique used/blood seen on insertion/dressing applied/flushes easily/secured in place/sterile technique, catheter placed

## 2022-05-27 NOTE — PROGRESS NOTE ADULT - SUBJECTIVE AND OBJECTIVE BOX
KARINA OVIEDO, 89y, Female  MRN-[id number]  Patient is a 89y old  Female who presents with a chief complaint of SEPSIS; ACUTE UTI     OVERNIGHT EVENTS: Pt tachycardic and lethargic with increased WOB. Patient given Ativan x2 overnight and Dilaudid for anxiety and restlessness. Pt noted to have increased WOB and given lasix 80mg IVP x1 overnight for low urine output. Patient later found to be more lethargic and not as responsive. Patient emergently intubated overnight.     SUBJECTIVE:    12 Point ROS Negative unless noted otherwise above.  -------------------------------------------------------------------------------  VITAL SIGNS:  Vital Signs Last 24 Hrs  T(C): 37.2 (27 May 2022 09:07), Max: 38.1 (27 May 2022 01:04)  T(F): 99 (27 May 2022 09:07), Max: 100.6 (27 May 2022 01:04)  HR: 94 (27 May 2022 12:00) (82 - 122)  BP: 117/71 (27 May 2022 12:00) (88/53 - 170/81)  BP(mean): 82 (27 May 2022 12:00) (67 - 116)  RR: 22 (27 May 2022 12:00) (10 - 26)  SpO2: 99% (27 May 2022 12:00) (83% - 100%)  I&O's Summary    26 May 2022 07:01  -  27 May 2022 07:00  --------------------------------------------------------  IN: 418.7 mL / OUT: 508 mL / NET: -89.3 mL    27 May 2022 07:01  -  27 May 2022 12:39  --------------------------------------------------------  IN: 391.7 mL / OUT: 190 mL / NET: 201.7 mL    PHYSICAL EXAM:    General: NAD, well developed  HEENT: NC/AT; EOMI, PERRLA, anicteric sclera; moist mucosal membranes.  Neck: supple, trachea midline  Cardiovascular: RRR, +S1/S2; NO M/R/G  Respiratory: CTA B/L; no W/R/R  Gastrointestinal: soft, NT/ND; +BSx4  Extremities: WWP; no edema or cyanosis  Vascular: 2+ radial, DP/PT pulses B/L  Neurological: AAOx3; no focal deficits    ALLERGIES:  Allergies    No Known Allergies    Intolerances    MEDICATIONS:  MEDICATIONS  (STANDING):  acetylcysteine 20%  Inhalation 4 milliLiter(s) Inhalation every 4 hours  albuterol/ipratropium for Nebulization 3 milliLiter(s) Nebulizer every 4 hours  atorvastatin 40 milliGRAM(s) Oral at bedtime  chlorhexidine 2% Cloths 1 Application(s) Topical <User Schedule>  dexMEDEtomidine Infusion 0.2 MICROgram(s)/kG/Hr (4.37 mL/Hr) IV Continuous <Continuous>  heparin   Injectable 5000 Unit(s) SubCutaneous every 8 hours  levothyroxine Injectable 60 MICROGram(s) IV Push at bedtime  methylPREDNISolone sodium succinate Injectable 40 milliGRAM(s) IV Push every 24 hours  metoprolol tartrate 12.5 milliGRAM(s) Oral every 12 hours  pantoprazole  Injectable 40 milliGRAM(s) IV Push every 24 hours  piperacillin/tazobactam IVPB.. 4.5 Gram(s) IV Intermittent every 12 hours  propofol Infusion 5 MICROgram(s)/kG/Min (2.62 mL/Hr) IV Continuous <Continuous>  valsartan 80 milliGRAM(s) Oral every 24 hours    MEDICATIONS  (PRN):  acetaminophen     Tablet .. 650 milliGRAM(s) Oral every 6 hours PRN Temp greater or equal to 38C (100.4F), Mild Pain (1 - 3)  melatonin 3 milliGRAM(s) Oral at bedtime PRN Insomnia  -------------------------------------------------------------------------------  LABS:                        9.8    21.94 )-----------( 201      ( 27 May 2022 04:12 )             32.5     05-27    139  |  106  |  41<H>  ----------------------------<  136<H>  4.6   |  21<L>  |  3.10<H>    Ca    8.5      27 May 2022 04:12  Phos  4.0     -  Mg     1.9         TPro  6.5  /  Alb  3.0<L>  /  TBili  0.5  /  DBili  x   /  AST  73<H>  /  ALT  30  /  AlkPhos  149<H>      LIVER FUNCTIONS - ( 26 May 2022 06:11 )  Alb: 3.0 g/dL / Pro: 6.5 g/dL / ALK PHOS: 149 U/L / ALT: 30 U/L / AST: 73 U/L / GGT: 61 U/L         Urinalysis Basic - ( 27 May 2022 03:54 )    Color: Yellow / Appearance: Cloudy / S.015 / pH: x  Gluc: x / Ketone: NEGATIVE  / Bili: Negative / Urobili: 0.2 E.U./dL   Blood: x / Protein: 30 mg/dL / Nitrite: NEGATIVE   Leuk Esterase: NEGATIVE / RBC: 5-10 /HPF / WBC 5-10 /HPF   Sq Epi: x / Non Sq Epi: 0-5 /HPF / Bacteria: Present /HPF      CAPILLARY BLOOD GLUCOSE      POCT Blood Glucose.: 172 mg/dL (26 May 2022 08:11)      Culture - Blood (collected 26 May 2022 17:45)  Source: .Blood Blood-Peripheral  Preliminary Report (27 May 2022 06:00):    No growth at 12 hours      SARS-CoV-2: NotDetec (23 May 2022 01:38)  COVID-19 PCR: NotDetec (11 Mar 2022 16:53)      RADIOLOGY & ADDITIONAL TESTS: Reviewed.   KARINA OVIEDO, 89y, Female  MRN-[id number]  Patient is a 89y old  Female who presents with a chief complaint of SEPSIS; ACUTE UTI     OVERNIGHT EVENTS: Pt tachycardic and lethargic with increased WOB. Patient given Ativan x2 overnight and Dilaudid for anxiety and restlessness. Pt noted to have increased WOB and given lasix 80mg IVP x1 overnight for low urine output. Patient later found to be more lethargic and not as responsive. Patient emergently intubated overnight.     SUBJECTIVE: Pt seen/examined at bedside. Pt vented/sedated. Cannot participate in ROS.     12 Point ROS Negative unless noted otherwise above.  -------------------------------------------------------------------------------  VITAL SIGNS:  Vital Signs Last 24 Hrs  T(C): 37.2 (27 May 2022 09:07), Max: 38.1 (27 May 2022 01:04)  T(F): 99 (27 May 2022 09:07), Max: 100.6 (27 May 2022 01:04)  HR: 94 (27 May 2022 12:00) (82 - 122)  BP: 117/71 (27 May 2022 12:00) (88/53 - 170/81)  BP(mean): 82 (27 May 2022 12:00) (67 - 116)  RR: 22 (27 May 2022 12:00) (10 - 26)  SpO2: 99% (27 May 2022 12:00) (83% - 100%)  I&O's Summary    26 May 2022 07:01  -  27 May 2022 07:00  --------------------------------------------------------  IN: 418.7 mL / OUT: 508 mL / NET: -89.3 mL    27 May 2022 07:01  -  27 May 2022 12:39  --------------------------------------------------------  IN: 391.7 mL / OUT: 190 mL / NET: 201.7 mL    PHYSICAL EXAM:    General: siting in bed; vented and sedated   HEENT: NC/AT; EOMI, PERRLA, anicteric sclera; moist mucosal membranes.  Neck: supple, trachea midline  Cardiovascular: RRR, +S1/S2; NO M/R/G  Respiratory: CTA B/L; no W/R/R  Gastrointestinal: soft, NT/ND; +BSx4  Extremities: WWP; no edema or cyanosis  Vascular: 2+ radial, DP/PT pulses B/L  Neurological: AAOx0    ALLERGIES:  Allergies    No Known Allergies    Intolerances    MEDICATIONS:  MEDICATIONS  (STANDING):  acetylcysteine 20%  Inhalation 4 milliLiter(s) Inhalation every 4 hours  albuterol/ipratropium for Nebulization 3 milliLiter(s) Nebulizer every 4 hours  atorvastatin 40 milliGRAM(s) Oral at bedtime  chlorhexidine 2% Cloths 1 Application(s) Topical <User Schedule>  dexMEDEtomidine Infusion 0.2 MICROgram(s)/kG/Hr (4.37 mL/Hr) IV Continuous <Continuous>  heparin   Injectable 5000 Unit(s) SubCutaneous every 8 hours  levothyroxine Injectable 60 MICROGram(s) IV Push at bedtime  methylPREDNISolone sodium succinate Injectable 40 milliGRAM(s) IV Push every 24 hours  metoprolol tartrate 12.5 milliGRAM(s) Oral every 12 hours  pantoprazole  Injectable 40 milliGRAM(s) IV Push every 24 hours  piperacillin/tazobactam IVPB.. 4.5 Gram(s) IV Intermittent every 12 hours  propofol Infusion 5 MICROgram(s)/kG/Min (2.62 mL/Hr) IV Continuous <Continuous>  valsartan 80 milliGRAM(s) Oral every 24 hours    MEDICATIONS  (PRN):  acetaminophen     Tablet .. 650 milliGRAM(s) Oral every 6 hours PRN Temp greater or equal to 38C (100.4F), Mild Pain (1 - 3)  melatonin 3 milliGRAM(s) Oral at bedtime PRN Insomnia  -------------------------------------------------------------------------------  LABS:                        9.8    21.94 )-----------( 201      ( 27 May 2022 04:12 )             32.5         139  |  106  |  41<H>  ----------------------------<  136<H>  4.6   |  21<L>  |  3.10<H>    Ca    8.5      27 May 2022 04:12  Phos  4.0       Mg     1.9         TPro  6.5  /  Alb  3.0<L>  /  TBili  0.5  /  DBili  x   /  AST  73<H>  /  ALT  30  /  AlkPhos  149<H>      LIVER FUNCTIONS - ( 26 May 2022 06:11 )  Alb: 3.0 g/dL / Pro: 6.5 g/dL / ALK PHOS: 149 U/L / ALT: 30 U/L / AST: 73 U/L / GGT: 61 U/L         Urinalysis Basic - ( 27 May 2022 03:54 )    Color: Yellow / Appearance: Cloudy / S.015 / pH: x  Gluc: x / Ketone: NEGATIVE  / Bili: Negative / Urobili: 0.2 E.U./dL   Blood: x / Protein: 30 mg/dL / Nitrite: NEGATIVE   Leuk Esterase: NEGATIVE / RBC: 5-10 /HPF / WBC 5-10 /HPF   Sq Epi: x / Non Sq Epi: 0-5 /HPF / Bacteria: Present /HPF      CAPILLARY BLOOD GLUCOSE      POCT Blood Glucose.: 172 mg/dL (26 May 2022 08:11)      Culture - Blood (collected 26 May 2022 17:45)  Source: .Blood Blood-Peripheral  Preliminary Report (27 May 2022 06:00):    No growth at 12 hours      SARS-CoV-2: NotDetec (23 May 2022 01:38)  COVID-19 PCR: NotDetec (11 Mar 2022 16:53)      RADIOLOGY & ADDITIONAL TESTS: Reviewed.   KARINA OVIEDO, 89y, Female  MRN-[id number]  Patient is a 89y old  Female who presents with a chief complaint of SEPSIS; ACUTE UTI     OVERNIGHT EVENTS: Pt tachycardic and lethargic with increased WOB. Patient given Ativan x2 overnight and Dilaudid for anxiety and restlessness. Pt noted to have increased WOB and given lasix 80mg IVP x1 overnight for low urine output. Patient later found to be more lethargic and not as responsive. Patient emergently intubated overnight.     SUBJECTIVE: Pt seen/examined at bedside. Pt vented/sedated. Cannot participate in ROS.     12 Point ROS Negative unless noted otherwise above.  -------------------------------------------------------------------------------  VITAL SIGNS:  Vital Signs Last 24 Hrs  T(C): 37.2 (27 May 2022 09:07), Max: 38.1 (27 May 2022 01:04)  T(F): 99 (27 May 2022 09:07), Max: 100.6 (27 May 2022 01:04)  HR: 94 (27 May 2022 12:00) (82 - 122)  BP: 117/71 (27 May 2022 12:00) (88/53 - 170/81)  BP(mean): 82 (27 May 2022 12:00) (67 - 116)  RR: 22 (27 May 2022 12:00) (10 - 26)  SpO2: 99% (27 May 2022 12:00) (83% - 100%)  I&O's Summary    26 May 2022 07:01  -  27 May 2022 07:00  --------------------------------------------------------  IN: 418.7 mL / OUT: 508 mL / NET: -89.3 mL    27 May 2022 07:01  -  27 May 2022 12:39  --------------------------------------------------------  IN: 391.7 mL / OUT: 190 mL / NET: 201.7 mL    PHYSICAL EXAM:    General: siting in bed; vented and sedated   HEENT: NC/AT  Neck: supple  Cardiovascular: RRR, +S1/S2  Respiratory: CTA B/L  Gastrointestinal: soft, NT/ND; +BSx4  Extremities: WWP; mottled LE   Vascular: 2+ radial, DP/PT pulses B/L  Neurological: AAOx0    ALLERGIES:  Allergies    No Known Allergies    Intolerances    MEDICATIONS:  MEDICATIONS  (STANDING):  acetylcysteine 20%  Inhalation 4 milliLiter(s) Inhalation every 4 hours  albuterol/ipratropium for Nebulization 3 milliLiter(s) Nebulizer every 4 hours  atorvastatin 40 milliGRAM(s) Oral at bedtime  chlorhexidine 2% Cloths 1 Application(s) Topical <User Schedule>  dexMEDEtomidine Infusion 0.2 MICROgram(s)/kG/Hr (4.37 mL/Hr) IV Continuous <Continuous>  heparin   Injectable 5000 Unit(s) SubCutaneous every 8 hours  levothyroxine Injectable 60 MICROGram(s) IV Push at bedtime  methylPREDNISolone sodium succinate Injectable 40 milliGRAM(s) IV Push every 24 hours  metoprolol tartrate 12.5 milliGRAM(s) Oral every 12 hours  pantoprazole  Injectable 40 milliGRAM(s) IV Push every 24 hours  piperacillin/tazobactam IVPB.. 4.5 Gram(s) IV Intermittent every 12 hours  propofol Infusion 5 MICROgram(s)/kG/Min (2.62 mL/Hr) IV Continuous <Continuous>  valsartan 80 milliGRAM(s) Oral every 24 hours    MEDICATIONS  (PRN):  acetaminophen     Tablet .. 650 milliGRAM(s) Oral every 6 hours PRN Temp greater or equal to 38C (100.4F), Mild Pain (1 - 3)  melatonin 3 milliGRAM(s) Oral at bedtime PRN Insomnia  -------------------------------------------------------------------------------  LABS:                        9.8    21.94 )-----------( 201      ( 27 May 2022 04:12 )             32.5     05-    139  |  106  |  41<H>  ----------------------------<  136<H>  4.6   |  21<L>  |  3.10<H>    Ca    8.5      27 May 2022 04:12  Phos  4.0     -  Mg     1.9         TPro  6.5  /  Alb  3.0<L>  /  TBili  0.5  /  DBili  x   /  AST  73<H>  /  ALT  30  /  AlkPhos  149<H>      LIVER FUNCTIONS - ( 26 May 2022 06:11 )  Alb: 3.0 g/dL / Pro: 6.5 g/dL / ALK PHOS: 149 U/L / ALT: 30 U/L / AST: 73 U/L / GGT: 61 U/L         Urinalysis Basic - ( 27 May 2022 03:54 )    Color: Yellow / Appearance: Cloudy / S.015 / pH: x  Gluc: x / Ketone: NEGATIVE  / Bili: Negative / Urobili: 0.2 E.U./dL   Blood: x / Protein: 30 mg/dL / Nitrite: NEGATIVE   Leuk Esterase: NEGATIVE / RBC: 5-10 /HPF / WBC 5-10 /HPF   Sq Epi: x / Non Sq Epi: 0-5 /HPF / Bacteria: Present /HPF      CAPILLARY BLOOD GLUCOSE      POCT Blood Glucose.: 172 mg/dL (26 May 2022 08:11)      Culture - Blood (collected 26 May 2022 17:45)  Source: .Blood Blood-Peripheral  Preliminary Report (27 May 2022 06:00):    No growth at 12 hours      SARS-CoV-2: NotDetec (23 May 2022 01:38)  COVID-19 PCR: NotDetec (11 Mar 2022 16:53)      RADIOLOGY & ADDITIONAL TESTS: Reviewed.

## 2022-05-27 NOTE — PROGRESS NOTE ADULT - ATTENDING COMMENTS
Patient is a 89y Female seen and evaluated at bedside.  The patient appears to be deteriorating at this time (4PM).  She was extubated earlier in the day and a decision was made by her  for DNI/DNR.  The patient was gasping for breath and BIPAP was instituted with relief of her respiratory discomfort.   She continues on antibiotics for pneumonia.  Creatinine rising to 3.5 mg/dl today.  Will continue ICU care.  Discussed with renal fellow and staff.

## 2022-05-27 NOTE — PROCEDURE NOTE - NSPROCNAME_GEN_A_CORE
Arterial Puncture/Cannulation
Peripheral Line Insertion
Gastric Intubation/Gastric Lavage
Peripheral Line Insertion

## 2022-05-27 NOTE — PROGRESS NOTE ADULT - ASSESSMENT
Ms. Mi is a 89yo F PMH HTN HLD, CKD (baseline Cr ~3), hypothyroidism, COPD, Depression, Gout, R heel ulcer presented 5/23 w/ nonbloody diarrhea, found to be septic and positive for coronavirus and influenza A, as well as GI PCR+ for campylobacter, course further c/b staph bacteremia (?contaminant), now complicated by COPD exacerbation with RRT called in AM of 5/26. Stepped up to MICU for closer respiratory monitoring.     Neuro  #Vented; Sedated  Patient lethargic and unresponsive overnight requiring intubation. Patient on precedex and propofol  - weaning propofol  - extubation today     Pulmonology  #Upper respiratory infection with Coronavirus    Endorses cough for past 2-3 days and RVP+ for coronavirus and influenza A (not COVID19).  concern for COPD exacerbation   - symptomatic management w/ IVF resuscitation, tylenol, encouraging PO intake and supplemental O2 in form of HFNC     #COPD, with acute exacerbation and acute on chronic respiratory failure    H/o COPD. Patient denies this or any smoking history although per chart review patient has diagnosis. Per outpatient med review pt appears to be on Symbicort 160mcg-4.5mcg 1 puff BID at home  RRT called AM of 5/26 for respiratory distress, with diffuse expiratory wheezing on exam and accessory muscle use on HFNC, c/w acute exacerbation   - work of breathing improved on BiPAP   - given multiple viral infections and COPD history with new onset wheezing, most likely COPD exacerbation triggered by underlying infections   - also with copious mucus secretions (clear, apurulent) and some element of mucus plugging contributing to respiratory distress   - s/p dose of vanc for bacteremia (coag negative on PCR); level subsequently therapeutic given CKD, low concern that decompensation due to worsening bacterial process given adequate antimicrobial coverage and otherwise aseptic at this time   - c/w duonebs standing q4h   - solumedrol 40 mg IVP x5 days for COPD exacerbation       Cardiac   #HTN (hypertension).   Known h/o HTN, takes Valsartan 320 mg qd, furosemide 20 mg qd  - patient no longer requiring Levophed since 7pm on 5/24 -- maintaining MAPs >65   - resume home meds as appropriate     #HLD  Known h/o HLD, takes rosuvastatin 10mg qhs at home.  - c/w atorvastatin 40 mg qhs.    GI  #Campylobacter Infection   Presented w/ 2-3d h/o nonbloody diarrhea. Likely 2/2 underlying coronavirus infection although cannot r/o independent GI infection. Pt is diffusely tender to palpation without rebound tenderness on abdominal exam. Campylobacter detected on GI PCR. Pt with low bicarb 2/2 diarrhea. S/p bicarb gtt w/ 3 amps bicarb on 5/23.   - completed adequate treatment for campylobacter   - diarrhea RESOLVED     ID  #Severe sepsis 2/2 Campylobacter, Coronavirus and Influenza infections; ?staph bacteremia -- RESOLVING   Met 3/4 SIRS criteria on admission (T 101.9, , RR 22) w/ lactate 2.1-->3.0 w/ +coronavirus (not COVID-19). Sepsis suspected 2/2 severe dehydration from poor PO intake i/s/o coronavirus infection along with fluid loss via diarrhea and vomiting. Also flu A and campylobacter +. Blood cultures from 5/23 subsequently with growth of coag negative staph on 5/25  - treat viral respiratory illness and campylobacter as above  - monitor strict Is/Os   - unclear whether coag negative staph contaminant (given it was only in one bottle and she is otherwise aseptic) vs true bacteremia given decompensation; s/p 1x dose of vancomycin wiht levels therapeutic given CKD and reduced clearance   - f/u BCx      Renal  CKD (chronic kidney disease).    Presented with Cr 3.22 which appears to be close to baseline. Currently producing urine.  - Currently at baseline    - avoid nephrotoxic medications  - strict Is/Os with simpson in place  - maintain MAP goal >60; off of pressors at the moment      MSK   #Heel ulceration.   Has known chronic R heel ulceration. Follows with Dr. Oliva (surgery). Currently wrapped in clean kerlix.  - wound care nurse consult while inpatient -- f/u recs      Endocrine   # Hypothyroidism.    - c/w synthroid 75 mcg qd.      Dispo: MICU  Code: FULL  Diet: Renal Diet; NPO while on BiPAP   DVT Proph: Heparin subq  Ms. Mi is a 89yo F PMH HTN HLD, CKD (baseline Cr ~3), hypothyroidism, COPD, Depression, Gout, R heel ulcer presented 5/23 w/ nonbloody diarrhea, found to be septic and positive for coronavirus and influenza A, as well as GI PCR+ for campylobacter, course further c/b staph bacteremia (?contaminant), now complicated by COPD exacerbation with RRT called in AM of 5/26. Stepped up to MICU for closer respiratory monitoring.     Neuro  #Vented; Sedated  Patient lethargic and unresponsive overnight requiring intubation. Patient on precedex and propofol  - weaning propofol  - extubation today     Pulmonology  #Upper respiratory infection with Coronavirus    Endorses cough for past 2-3 days and RVP+ for coronavirus and influenza A (not COVID19).  concern for COPD exacerbation   - symptomatic management w/ IVF resuscitation, tylenol, encouraging PO intake and supplemental O2 in form of HFNC     #COPD, with acute exacerbation and acute on chronic respiratory failure    H/o COPD. Patient denies this or any smoking history although per chart review patient has diagnosis. Per outpatient med review pt appears to be on Symbicort 160mcg-4.5mcg 1 puff BID at home  RRT called AM of 5/26 for respiratory distress, with diffuse expiratory wheezing on exam and accessory muscle use on HFNC, c/w acute exacerbation   - work of breathing improved on BiPAP   - given multiple viral infections and COPD history with new onset wheezing, most likely COPD exacerbation triggered by underlying infections   - also with copious mucus secretions (clear, apurulent) and some element of mucus plugging contributing to respiratory distress   - s/p dose of vanc for bacteremia (coag negative on PCR); level subsequently therapeutic given CKD, low concern that decompensation due to worsening bacterial process given adequate antimicrobial coverage and otherwise aseptic at this time   - c/w duonebs standing q4h   - solumedrol 40 mg IVP x5 days for COPD exacerbation       Cardiac    #HFrEF  TTE showing multiple segmental abnormalities, mid and apical LV segments are akinetic, normal RV size and systolic function. Moderate/severe TR with moderate MR. LV systolic function severely reduced with EF 10-15% with regional wall motion abnormalities   - Pt resumed on home lasix 40mg qd   - Pt might be discharged with home w/ hospice services per palliative given EF   - f/u recs and avoid fluids      #HTN (hypertension).   Known h/o HTN, takes Valsartan 320 mg qd, furosemide 20 mg qd  - patient no longer requiring Levophed since 7pm on 5/24 -- maintaining MAPs >65   - resume home meds as appropriate     #HLD  Known h/o HLD, takes rosuvastatin 10mg qhs at home.  - c/w atorvastatin 40 mg qhs.    GI  #Campylobacter Infection   Presented w/ 2-3d h/o nonbloody diarrhea. Likely 2/2 underlying coronavirus infection although cannot r/o independent GI infection. Pt is diffusely tender to palpation without rebound tenderness on abdominal exam. Campylobacter detected on GI PCR. Pt with low bicarb 2/2 diarrhea. S/p bicarb gtt w/ 3 amps bicarb on 5/23.   - completed adequate treatment for campylobacter   - diarrhea RESOLVED     ID  #Severe sepsis 2/2 Campylobacter, Coronavirus and Influenza infections; ?staph bacteremia -- RESOLVING   Met 3/4 SIRS criteria on admission (T 101.9, , RR 22) w/ lactate 2.1-->3.0 w/ +coronavirus (not COVID-19). Sepsis suspected 2/2 severe dehydration from poor PO intake i/s/o coronavirus infection along with fluid loss via diarrhea and vomiting. Also flu A and campylobacter +. Blood cultures from 5/23 subsequently with growth of coag negative staph on 5/25  - treat viral respiratory illness and campylobacter as above  - monitor strict Is/Os   - unclear whether coag negative staph contaminant (given it was only in one bottle and she is otherwise aseptic) vs true bacteremia given decompensation; s/p 1x dose of vancomycin wiht levels therapeutic given CKD and reduced clearance   - f/u BCx      Renal  CKD (chronic kidney disease).    Presented with Cr 3.22 which appears to be close to baseline. Currently producing urine.  - Currently at baseline    - avoid nephrotoxic medications  - strict Is/Os with simpson in place  - maintain MAP goal >60; off of pressors at the moment      MSK   #Heel ulceration.   Has known chronic R heel ulceration. Follows with Dr. Oliva (surgery). Currently wrapped in clean kerlix.  - wound care nurse consult while inpatient -- f/u recs      Endocrine   # Hypothyroidism.    - c/w synthroid 75 mcg qd.    GOC Conversation  GOC conversation occured between patient and palliative at bedside. Pt stating she would not want the tube to be replaced if respiratory status declined, but would rather want O2 and medications to assist with comfort. MOLST filled out and updated. Pt with EF 10% -- would quality with home w/ hospice upon DC  - Pt DNR/DNI   - ongoing GOC conversation with palliative     Dispo: MICU  Code: DNR/DNI   Diet: Renal Diet; NPO while on BiPAP   DVT Proph: Heparin subq

## 2022-05-27 NOTE — PROVIDER CONTACT NOTE (OTHER) - ASSESSMENT
pt noted tachycardiac with increased work of breathing, pt also with minimal urine output   HR= 122, BP = 117/79, RR= 25 O2 sat= 94% on BiPAP with FiO2 @45%

## 2022-05-27 NOTE — PROCEDURE NOTE - NSINFORMCONSENT_GEN_A_CORE
This was an emergent procedure.
Benefits, risks, and possible complications of procedure explained to patient/caregiver who verbalized understanding and gave verbal consent.
consent was implied/This was an emergent procedure.
Benefits, risks, and possible complications of procedure explained to patient/caregiver who verbalized understanding and gave verbal consent.

## 2022-05-28 NOTE — PROGRESS NOTE ADULT - CRITICAL CARE ATTENDING COMMENT
Acute hypoxemic respiratory failure 2/2 non SARS COV2 coronavirus, influenza A c/b campylobacter diarrhea. Returned to MICU with worsening respiratory status, required mechanical ventilation for WOB. Doing well on pressure support; extubate today. Finish 5 days of abx. [facility] discussion in progress.

## 2022-05-28 NOTE — PROGRESS NOTE ADULT - ASSESSMENT
89F with pmhx of CKD V (baseline Cr 3.5), HTN, depression who is presenting for 2-3 days of diarrhea and URI like symptoms. Nephrology consulted for CKD management and acidosis    Assessment/Plan:   #CKD Stage V (Baseline Cr 3.5)  #Metabolic acidosis w/ Respiratory acidosis (Resolved)  Creatinine remains stable within her baseline  -Trend BMP daily  -Agree with Lasix 40mg IV q12h  -Avoid nephrotoxic meds  -Monitor I/O's  -Renal diet    Tonya Sims D.O.  PGY 4 - Nephrology Fellow  [phone]

## 2022-05-28 NOTE — PROGRESS NOTE ADULT - SUBJECTIVE AND OBJECTIVE BOX
Patient is a 89y Female seen and evaluated at bedside remains stable. Creatinine 3.03, Hgb 9.4, K of 4      Meds:    acetaminophen     Tablet .. 650 every 6 hours PRN  acetylcysteine 20%  Inhalation 4 every 4 hours  albuterol/ipratropium for Nebulization 3 every 4 hours  atorvastatin 40 at bedtime  chlorhexidine 2% Cloths 1 <User Schedule>  furosemide   Injectable 40 every 12 hours  heparin   Injectable 5000 every 8 hours  levothyroxine Injectable 60 at bedtime  melatonin 3 at bedtime PRN  methylPREDNISolone sodium succinate Injectable 40 every 24 hours  metoprolol tartrate Injectable 2.5 every 6 hours  pantoprazole  Injectable 40 every 24 hours  piperacillin/tazobactam IVPB.. 4.5 every 12 hours      T(C): , Max: 36.8 (22 @ 18:07)  T(F): , Max: 98.3 (22 @ 18:07)  HR: 94 (22 @ 12:04)  BP: 108/67 (22 @ 12:00)  BP(mean): 83 (22 @ 12:00)  RR: 10 (22 @ 12:04)  SpO2: 93% (22 @ 12:04)  Wt(kg): --     @ 07:01  -   @ 07:00  --------------------------------------------------------  IN: 591.7 mL / OUT: 980 mL / NET: -388.3 mL     @ 07:01  -   @ 13:14  --------------------------------------------------------  IN: 0 mL / OUT: 185 mL / NET: -185 mL    Review of Systems:  ROS negative except as per HPI    PHYSICAL EXAM:  GENERAL: NAD on NC  NECK: supple, No JVD  CHEST/LUNG: b/l mild wheezing appreciated  HEART: tachycardic no m/r/g  ABDOMEN: Soft, Nontender, +BS, No flank tenderness bilateral  EXTREMITIES: cool, dry no edema appreciated    LABS:                        9.4    13.51 )-----------( 204      ( 28 May 2022 06:16 )             31.7         142  |  104  |  48<H>  ----------------------------<  143<H>  4.0   |  21<L>  |  3.03<H>    Ca    8.8      28 May 2022 06:16  Phos  4.8       Mg     1.8               Urinalysis Basic - ( 27 May 2022 03:54 )    Color: Yellow / Appearance: Cloudy / S.015 / pH: x  Gluc: x / Ketone: NEGATIVE  / Bili: Negative / Urobili: 0.2 E.U./dL   Blood: x / Protein: 30 mg/dL / Nitrite: NEGATIVE   Leuk Esterase: NEGATIVE / RBC: 5-10 /HPF / WBC 5-10 /HPF   Sq Epi: x / Non Sq Epi: 0-5 /HPF / Bacteria: Present /HPF      Sodium, Random Urine: 39 mmol/L ( @ 03:54)  Osmolality, Random Urine: 284 mosm/kg ( @ 03:54)  Creatinine, Random Urine: 65 mg/dL ( @ 03:54)        RADIOLOGY & ADDITIONAL STUDIES:

## 2022-05-28 NOTE — PROGRESS NOTE ADULT - ASSESSMENT
Ms. Mi is a 89yo F PMH HTN HLD, CKD (baseline Cr ~3), hypothyroidism, COPD, Depression, Gout, R heel ulcer presented 5/23 w/ nonbloody diarrhea, found to be septic and positive for coronavirus and influenza A, as well as GI PCR+ for campylobacter, course further c/b staph bacteremia (?contaminant), now complicated by COPD exacerbation with RRT called in AM of 5/26. Stepped up to MICU for closer respiratory monitoring.     Neuro  #Vented; Sedated  Patient lethargic and unresponsive overnight requiring intubation. Patient on precedex and propofol  - Extubated 5/27  - Trial off bipap today    Pulmonology  #Upper respiratory infection with Coronavirus    Endorses cough for past 2-3 days and RVP+ for coronavirus and influenza A (not COVID19).  concern for COPD exacerbation   - symptomatic management w/ IVF resuscitation, tylenol, encouraging PO intake and supplemental O2 in form of HFNC     #COPD, with acute exacerbation and acute on chronic respiratory failure    H/o COPD. Patient denies this or any smoking history although per chart review patient has diagnosis. Per outpatient med review pt appears to be on Symbicort 160mcg-4.5mcg 1 puff BID at home  RRT called AM of 5/26 for respiratory distress, with diffuse expiratory wheezing on exam and accessory muscle use on HFNC, c/w acute exacerbation   - work of breathing improved on BiPAP   - given multiple viral infections and COPD history with new onset wheezing, most likely COPD exacerbation triggered by underlying infections   - also with copious mucus secretions (clear, apurulent) and some element of mucus plugging contributing to respiratory distress   - s/p dose of vanc for bacteremia (coag negative on PCR); level subsequently therapeutic given CKD, low concern that decompensation due to worsening bacterial process given adequate antimicrobial coverage and otherwise aseptic at this time   - c/w duonebs standing q4h   - solumedrol 40 mg IVP x5 days for COPD exacerbation       Cardiac    #HFrEF  TTE showing multiple segmental abnormalities, mid and apical LV segments are akinetic, normal RV size and systolic function. Moderate/severe TR with moderate MR. LV systolic function severely reduced with EF 10-15% with regional wall motion abnormalities   - Pt resumed on home lasix 40mg qd   - Pt might be discharged with home w/ hospice services per palliative given EF   - f/u recs and avoid fluids      #HTN (hypertension).   Known h/o HTN, takes Valsartan 320 mg qd, furosemide 20 mg qd  - patient no longer requiring Levophed since 7pm on 5/24 -- maintaining MAPs >65   - resume home meds as appropriate     #HLD  Known h/o HLD, takes rosuvastatin 10mg qhs at home.  - c/w atorvastatin 40 mg qhs.    GI  #Campylobacter Infection   Presented w/ 2-3d h/o nonbloody diarrhea. Likely 2/2 underlying coronavirus infection although cannot r/o independent GI infection. Pt is diffusely tender to palpation without rebound tenderness on abdominal exam. Campylobacter detected on GI PCR. Pt with low bicarb 2/2 diarrhea. S/p bicarb gtt w/ 3 amps bicarb on 5/23.   - completed adequate treatment for campylobacter   - diarrhea RESOLVED     ID  #Severe sepsis 2/2 Campylobacter, Coronavirus and Influenza infections; ?staph bacteremia -- RESOLVING   Met 3/4 SIRS criteria on admission (T 101.9, , RR 22) w/ lactate 2.1-->3.0 w/ +coronavirus (not COVID-19). Sepsis suspected 2/2 severe dehydration from poor PO intake i/s/o coronavirus infection along with fluid loss via diarrhea and vomiting. Also flu A and campylobacter +. Blood cultures from 5/23 subsequently with growth of coag negative staph on 5/25  - treat viral respiratory illness and campylobacter as above  - monitor strict Is/Os   - unclear whether coag negative staph contaminant (given it was only in one bottle and she is otherwise aseptic) vs true bacteremia given decompensation; s/p 1x dose of vancomycin wiht levels therapeutic given CKD and reduced clearance   - f/u BCx      Renal  CKD (chronic kidney disease).    Presented with Cr 3.22 which appears to be close to baseline. Currently producing urine.  - Currently at baseline    - avoid nephrotoxic medications  - strict Is/Os with simpson in place  - maintain MAP goal >60; off of pressors at the moment      MSK   #Heel ulceration.   Has known chronic R heel ulceration. Follows with Dr. Oliva (surgery). Currently wrapped in clean kerlix.  - wound care nurse consult while inpatient -- f/u recs      Endocrine   # Hypothyroidism.    - c/w synthroid 75 mcg qd.    GOC Conversation  GOC conversation occured between patient and palliative at bedside. Pt stating she would not want the tube to be replaced if respiratory status declined, but would rather want O2 and medications to assist with comfort. MOLST filled out and updated. Pt with EF 10% -- would quality with home w/ hospice upon DC  - Pt DNR/DNI   - ongoing GOC conversation with palliative     Dispo: MICU  Code: DNR/DNI   Diet: Renal Diet; NPO while on BiPAP   DVT Proph: Heparin subq    Ms. Mi is a 89yo F PMH HTN HLD, CKD (baseline Cr ~3), hypothyroidism, COPD, Depression, Gout, R heel ulcer presented 5/23 w/ nonbloody diarrhea, found to be septic and positive for coronavirus and influenza A, as well as GI PCR+ for campylobacter, course further c/b staph bacteremia (?contaminant), now complicated by COPD exacerbation with RRT called in AM of 5/26. Stepped up to MICU for closer respiratory monitoring.     Neuro  #Vented; Sedated  Patient lethargic and unresponsive overnight requiring intubation. Patient on precedex and propofol  - Extubated 5/27  - Trial off bipap today    Pulmonology  #Upper respiratory infection with Coronavirus    Endorses cough for past 2-3 days and RVP+ for coronavirus and influenza A (not COVID19).  concern for COPD exacerbation   - symptomatic management w/ IVF resuscitation, tylenol, encouraging PO intake and supplemental O2 in form of HFNC     #COPD, with acute exacerbation and acute on chronic respiratory failure    H/o COPD. Patient denies this or any smoking history although per chart review patient has diagnosis. Per outpatient med review pt appears to be on Symbicort 160mcg-4.5mcg 1 puff BID at home  RRT called AM of 5/26 for respiratory distress, with diffuse expiratory wheezing on exam and accessory muscle use on HFNC, c/w acute exacerbation   - work of breathing improved on BiPAP   - given multiple viral infections and COPD history with new onset wheezing, most likely COPD exacerbation triggered by underlying infections   - also with copious mucus secretions (clear, apurulent) and some element of mucus plugging contributing to respiratory distress   - s/p dose of vanc for bacteremia (coag negative on PCR); level subsequently therapeutic given CKD, low concern that decompensation due to worsening bacterial process given adequate antimicrobial coverage and otherwise aseptic at this time   - c/w duonebs standing q4h   - solumedrol 40 mg IVP x5 days for COPD exacerbation       Cardiac    #HFrEF  TTE showing multiple segmental abnormalities, mid and apical LV segments are akinetic, normal RV size and systolic function. Moderate/severe TR with moderate MR. LV systolic function severely reduced with EF 10-15% with regional wall motion abnormalities   - Pt resumed on home lasix 40mg qd   - Pt might be discharged with home w/ hospice services per palliative given EF   - f/u recs and avoid fluids      #HTN (hypertension).   Known h/o HTN, takes Valsartan 320 mg qd, furosemide 20 mg qd  - patient no longer requiring Levophed since 7pm on 5/24 -- maintaining MAPs >65   - resume home meds as appropriate     #HLD  Known h/o HLD, takes rosuvastatin 10mg qhs at home.  - c/w atorvastatin 40 mg qhs.    GI  #Campylobacter Infection   Presented w/ 2-3d h/o nonbloody diarrhea. Likely 2/2 underlying coronavirus infection although cannot r/o independent GI infection. Pt is diffusely tender to palpation without rebound tenderness on abdominal exam. Campylobacter detected on GI PCR. Pt with low bicarb 2/2 diarrhea. S/p bicarb gtt w/ 3 amps bicarb on 5/23.   - completed adequate treatment for campylobacter   - diarrhea RESOLVED     ID  #Severe sepsis 2/2 Campylobacter, Coronavirus and Influenza infections; ?staph bacteremia -- RESOLVING   Met 3/4 SIRS criteria on admission (T 101.9, , RR 22) w/ lactate 2.1-->3.0 w/ +coronavirus (not COVID-19). Sepsis suspected 2/2 severe dehydration from poor PO intake i/s/o coronavirus infection along with fluid loss via diarrhea and vomiting. Also flu A and campylobacter +. Blood cultures from 5/23 subsequently with growth of coag negative staph on 5/25  - treat viral respiratory illness and campylobacter as above  - monitor strict Is/Os   - unclear whether coag negative staph contaminant (given it was only in one bottle and she is otherwise aseptic) vs true bacteremia given decompensation; s/p 1x dose of vancomycin wiht levels therapeutic given CKD and reduced clearance   - BCx (5/26): ngtd     Renal  CKD (chronic kidney disease).    Presented with Cr 3.22 which appears to be close to baseline. Currently producing urine.  - Currently at baseline    - avoid nephrotoxic medications  - strict Is/Os with simpson in place  - maintain MAP goal >60; off of pressors at the moment      MSK   #Heel ulceration.   Has known chronic R heel ulceration. Follows with Dr. Oliva (surgery). Currently wrapped in clean kerlix.  - wound care nurse consult while inpatient -- f/u recs      Endocrine   # Hypothyroidism.    - c/w synthroid 75 mcg qd.    GOC Conversation  GOC conversation occured between patient and palliative at bedside. Pt stating she would not want the tube to be replaced if respiratory status declined, but would rather want O2 and medications to assist with comfort. MOLST filled out and updated. Pt with EF 10% -- would quality with home w/ hospice upon DC  - Pt DNR/DNI   - ongoing GOC conversation with palliative     Dispo: MICU  Code: DNR/DNI   Diet: dysphagia eval  DVT Proph: Heparin subq    Ms. Mi is a 89yo F PMH HTN HLD, CKD (baseline Cr ~3), hypothyroidism, COPD, Depression, Gout, R heel ulcer presented 5/23 w/ nonbloody diarrhea, found to be septic and positive for coronavirus and influenza A, as well as GI PCR+ for campylobacter, course further c/b staph bacteremia (?contaminant), now complicated by COPD exacerbation with RRT called in AM of 5/26. Stepped up to MICU for closer respiratory monitoring.     Neuro  #Vented; Sedated  Patient lethargic and unresponsive overnight requiring intubation. Patient on precedex and propofol  - Extubated 5/27  - Trial off bipap today    Pulmonology  #Upper respiratory infection with Coronavirus    Endorses cough for past 2-3 days and RVP+ for coronavirus and influenza A (not COVID19).  concern for COPD exacerbation   - symptomatic management w/ IVF resuscitation, tylenol, encouraging PO intake and supplemental O2 in form of HFNC     #COPD, with acute exacerbation and acute on chronic respiratory failure    H/o COPD. Patient denies this or any smoking history although per chart review patient has diagnosis. Per outpatient med review pt appears to be on Symbicort 160mcg-4.5mcg 1 puff BID at home  RRT called AM of 5/26 for respiratory distress, with diffuse expiratory wheezing on exam and accessory muscle use on HFNC, c/w acute exacerbation   - work of breathing improved on BiPAP   - given multiple viral infections and COPD history with new onset wheezing, most likely COPD exacerbation triggered by underlying infections   - also with copious mucus secretions (clear, apurulent) and some element of mucus plugging contributing to respiratory distress   - s/p dose of vanc for bacteremia (coag negative on PCR); level subsequently therapeutic given CKD, low concern that decompensation due to worsening bacterial process given adequate antimicrobial coverage and otherwise aseptic at this time   - c/w duonebs standing q4h   - solumedrol 40 mg IVP x5 days for COPD exacerbation       Cardiac    #HFrEF  TTE showing multiple segmental abnormalities, mid and apical LV segments are akinetic, normal RV size and systolic function. Moderate/severe TR with moderate MR. LV systolic function severely reduced with EF 10-15% with regional wall motion abnormalities   - Pt resumed on home lasix 40mg qd   - Pt might be discharged with home w/ hospice services per palliative given EF   - f/u recs and avoid fluids      #HTN (hypertension).   Known h/o HTN, takes Valsartan 320 mg qd, furosemide 20 mg qd  - patient no longer requiring Levophed since 7pm on 5/24 -- maintaining MAPs >65   - resume home meds as appropriate     #HLD  Known h/o HLD, takes rosuvastatin 10mg qhs at home.  - c/w atorvastatin 40 mg qhs.    GI  #Campylobacter Infection   Presented w/ 2-3d h/o nonbloody diarrhea. Likely 2/2 underlying coronavirus infection although cannot r/o independent GI infection. Pt is diffusely tender to palpation without rebound tenderness on abdominal exam. Campylobacter detected on GI PCR. Pt with low bicarb 2/2 diarrhea. S/p bicarb gtt w/ 3 amps bicarb on 5/23.   - completed adequate treatment for campylobacter   - diarrhea RESOLVED     ID  #Severe sepsis 2/2 Campylobacter, Coronavirus and Influenza infections; ?staph bacteremia -- RESOLVING   Met 3/4 SIRS criteria on admission (T 101.9, , RR 22) w/ lactate 2.1-->3.0 w/ +coronavirus (not COVID-19). Sepsis suspected 2/2 severe dehydration from poor PO intake i/s/o coronavirus infection along with fluid loss via diarrhea and vomiting. Also flu A and campylobacter +. Blood cultures from 5/23 subsequently with growth of coag negative staph on 5/25  - treat viral respiratory illness and campylobacter as above  - monitor strict Is/Os   - unclear whether coag negative staph contaminant (given it was only in one bottle and she is otherwise aseptic) vs true bacteremia given decompensation; s/p 1x dose of vancomycin wiht levels therapeutic given CKD and reduced clearance   - BCx (5/26): ngtd  - vanc level 22.5  - f/u repeat vanc level     Renal  CKD (chronic kidney disease).    Presented with Cr 3.22 which appears to be close to baseline. Currently producing urine.  - Currently at baseline    - avoid nephrotoxic medications  - strict Is/Os with simpson in place  - maintain MAP goal >60; off of pressors at the moment      MSK   #Heel ulceration.   Has known chronic R heel ulceration. Follows with Dr. Oliva (surgery). Currently wrapped in clean kerlix.  - wound care nurse consult while inpatient -- f/u recs      Endocrine   # Hypothyroidism.    - c/w synthroid 75 mcg qd.    GOC Conversation  GOC conversation occured between patient and palliative at bedside. Pt stating she would not want the tube to be replaced if respiratory status declined, but would rather want O2 and medications to assist with comfort. MOLST filled out and updated. Pt with EF 10% -- would quality with home w/ hospice upon DC  - Pt DNR/DNI   - ongoing GOC conversation with palliative     Dispo: MICU  Code: DNR/DNI   Diet: dysphagia eval  DVT Proph: Heparin subq

## 2022-05-28 NOTE — PROGRESS NOTE ADULT - SUBJECTIVE AND OBJECTIVE BOX
OVERNIGHT EVENTS: Complained of leg pain with lidocaine.    SUBJECTIVE / INTERVAL HPI: Patient seen and examined at bedside.     VITAL SIGNS:  Vital Signs Last 24 Hrs  T(C): 36.4 (28 May 2022 08:48), Max: 36.8 (27 May 2022 18:07)  T(F): 97.5 (28 May 2022 08:48), Max: 98.3 (27 May 2022 18:07)  HR: 91 (28 May 2022 13:35) (74 - 97)  BP: 128/69 (28 May 2022 13:35) (91/48 - 133/59)  BP(mean): 91 (28 May 2022 13:35) (67 - 97)  RR: 27 (28 May 2022 13:35) (10 - 37)  SpO2: 95% (28 May 2022 13:35) (90% - 100%)    PHYSICAL EXAM:    General: Well developed, well nourished, no acute distress  HEENT: NC/AT; PERRL, anicteric sclera; MMM  Neck: supple  Cardiovascular: +S1/S2, RRR, no murmurs, rubs, gallops  Respiratory: CTA B/L; no W/R/R  Gastrointestinal: soft, NT/ND; +BSx4  Extremities: WWP; no edema, clubbing or cyanosis  Vascular: 2+ radial, DP/PT pulses B/L  Neurological: AAOx3; no focal deficits    MEDICATIONS:  MEDICATIONS  (STANDING):  acetylcysteine 20%  Inhalation 4 milliLiter(s) Inhalation every 4 hours  albuterol/ipratropium for Nebulization 3 milliLiter(s) Nebulizer every 4 hours  atorvastatin 40 milliGRAM(s) Oral at bedtime  chlorhexidine 2% Cloths 1 Application(s) Topical <User Schedule>  furosemide   Injectable 40 milliGRAM(s) IV Push every 12 hours  heparin   Injectable 5000 Unit(s) SubCutaneous every 8 hours  levothyroxine Injectable 60 MICROGram(s) IV Push at bedtime  methylPREDNISolone sodium succinate Injectable 40 milliGRAM(s) IV Push every 24 hours  metoprolol tartrate Injectable 2.5 milliGRAM(s) IV Push every 6 hours  pantoprazole  Injectable 40 milliGRAM(s) IV Push every 24 hours  piperacillin/tazobactam IVPB.. 4.5 Gram(s) IV Intermittent every 12 hours    MEDICATIONS  (PRN):  acetaminophen     Tablet .. 650 milliGRAM(s) Oral every 6 hours PRN Temp greater or equal to 38C (100.4F), Mild Pain (1 - 3)  melatonin 3 milliGRAM(s) Oral at bedtime PRN Insomnia      ALLERGIES:  Allergies    No Known Allergies    Intolerances        LABS:                        9.4    13.51 )-----------( 204      ( 28 May 2022 06:16 )             31.7         142  |  104  |  48<H>  ----------------------------<  143<H>  4.0   |  21<L>  |  3.03<H>    Ca    8.8      28 May 2022 06:16  Phos  4.8       Mg     1.8             Urinalysis Basic - ( 27 May 2022 03:54 )    Color: Yellow / Appearance: Cloudy / S.015 / pH: x  Gluc: x / Ketone: NEGATIVE  / Bili: Negative / Urobili: 0.2 E.U./dL   Blood: x / Protein: 30 mg/dL / Nitrite: NEGATIVE   Leuk Esterase: NEGATIVE / RBC: 5-10 /HPF / WBC 5-10 /HPF   Sq Epi: x / Non Sq Epi: 0-5 /HPF / Bacteria: Present /HPF      CAPILLARY BLOOD GLUCOSE      POCT Blood Glucose.: 113 mg/dL (27 May 2022 23:31)      RADIOLOGY & ADDITIONAL TESTS: Reviewed. OVERNIGHT EVENTS: Complained of leg pain and given lidocaine patch. Was removing bipap overnight so was on HFNC for rest of night. No other overnight event.    SUBJECTIVE / INTERVAL HPI: Patient seen and examined at bedside. Reports breathing is okay at the moment and wondering when she can be discharged to go home. No other questions or complaints. Denies headache, dizziness, fever, chills, chest pain, abd pain.    VITAL SIGNS:  Vital Signs Last 24 Hrs  T(C): 36.4 (28 May 2022 08:48), Max: 36.8 (27 May 2022 18:07)  T(F): 97.5 (28 May 2022 08:48), Max: 98.3 (27 May 2022 18:07)  HR: 91 (28 May 2022 13:35) (74 - 97)  BP: 128/69 (28 May 2022 13:35) (91/48 - 133/59)  BP(mean): 91 (28 May 2022 13:35) (67 - 97)  RR: 27 (28 May 2022 13:35) (10 - 37)  SpO2: 95% (28 May 2022 13:35) (90% - 100%)    PHYSICAL EXAM:    General: Elderly female lying in bed in no acute distress on bipap  HEENT: NC/AT; PERRL, anicteric sclera; MMM  Neck: supple  Cardiovascular: +S1/S2, RRR, no murmurs, rubs, gallops  Respiratory: mild bliateral ronchi L>R  Gastrointestinal: soft, NT/ND; +BSx4  Extremities: warm lower extremities with mild mottle appearance  Vascular: 2+ radial, DP/PT pulses B/L  Neurological: AAOx2-3; no focal deficits    MEDICATIONS:  MEDICATIONS  (STANDING):  acetylcysteine 20%  Inhalation 4 milliLiter(s) Inhalation every 4 hours  albuterol/ipratropium for Nebulization 3 milliLiter(s) Nebulizer every 4 hours  atorvastatin 40 milliGRAM(s) Oral at bedtime  chlorhexidine 2% Cloths 1 Application(s) Topical <User Schedule>  furosemide   Injectable 40 milliGRAM(s) IV Push every 12 hours  heparin   Injectable 5000 Unit(s) SubCutaneous every 8 hours  levothyroxine Injectable 60 MICROGram(s) IV Push at bedtime  methylPREDNISolone sodium succinate Injectable 40 milliGRAM(s) IV Push every 24 hours  metoprolol tartrate Injectable 2.5 milliGRAM(s) IV Push every 6 hours  pantoprazole  Injectable 40 milliGRAM(s) IV Push every 24 hours  piperacillin/tazobactam IVPB.. 4.5 Gram(s) IV Intermittent every 12 hours    MEDICATIONS  (PRN):  acetaminophen     Tablet .. 650 milliGRAM(s) Oral every 6 hours PRN Temp greater or equal to 38C (100.4F), Mild Pain (1 - 3)  melatonin 3 milliGRAM(s) Oral at bedtime PRN Insomnia      ALLERGIES:  Allergies    No Known Allergies    Intolerances        LABS:                        9.4    13.51 )-----------( 204      ( 28 May 2022 06:16 )             31.7         142  |  104  |  48<H>  ----------------------------<  143<H>  4.0   |  21<L>  |  3.03<H>    Ca    8.8      28 May 2022 06:16  Phos  4.8       Mg     1.8             Urinalysis Basic - ( 27 May 2022 03:54 )    Color: Yellow / Appearance: Cloudy / S.015 / pH: x  Gluc: x / Ketone: NEGATIVE  / Bili: Negative / Urobili: 0.2 E.U./dL   Blood: x / Protein: 30 mg/dL / Nitrite: NEGATIVE   Leuk Esterase: NEGATIVE / RBC: 5-10 /HPF / WBC 5-10 /HPF   Sq Epi: x / Non Sq Epi: 0-5 /HPF / Bacteria: Present /HPF      CAPILLARY BLOOD GLUCOSE      POCT Blood Glucose.: 113 mg/dL (27 May 2022 23:31)      RADIOLOGY & ADDITIONAL TESTS: Reviewed.

## 2022-05-29 NOTE — PROGRESS NOTE ADULT - ATTENDING COMMENTS
Acute hypoxemic respiratory failure 2/2 non SARS COV2 coronavirus, influenza A c/b campylobacter diarrhea. Returned to MICU with worsening respiratory status, required mechanical ventilation for WOB. Doing well post extubation; on HFNC. Finish 5 days of abx. Now DNR/DNI. Daily assessment for diuresis. Pulmonary toilet. Acute hypoxemic respiratory failure 2/2 non SARS COV2 coronavirus, influenza A c/b campylobacter diarrhea. Returned to MICU with worsening respiratory status, required mechanical ventilation for WOB. Doing well post extubation; on HFNC. Finish 5 days of abx. Now DNR/DNI. Daily assessment for diuresis. Pulmonary toilet. Hospice evaluation early this week.

## 2022-05-29 NOTE — PROGRESS NOTE ADULT - PROBLEM SELECTOR PLAN 3
Endorses cough for past 2-3 days and RVP+ for coronavirus and influenza A (not COVID19).    - symptomatic management w/ IVF resuscitation, tylenol, encouraging PO intake and supplemental O2 in form of HFNC   - BiPAP at night  - concern for superimposed bacterial pneumonia not responding to CTX ---> - continue to complete 5 day course of Zosyn Endorses cough for past 2-3 days and RVP+ for coronavirus and influenza A (not COVID19).    - symptomatic management w/ IVF resuscitation, tylenol, encouraging PO intake and supplemental O2 in form of HFNC   - BiPAP at night      #Bacteremia  blood culture on 5/23 growing staph capitis and staph epidermidis  - holding vanc i/s/o worsening kidney function  - c/w zosyn   - repeat blood culture on 5/26 NGTD Endorses cough for past 2-3 days and RVP+ for coronavirus and influenza A (not COVID19).    - symptomatic management w/ IVF resuscitation, tylenol, encouraging PO intake and supplemental O2 in form of HFNC   - BiPAP at night      #Bacteremia  blood culture on 5/23 growing staph capitis and staph epidermidis  - holding vanc i/s/o worsening kidney function  - c/w zosyn till June 2nd  - repeat blood culture on 5/26 NGTD Endorses cough for past 2-3 days and RVP+ for coronavirus and influenza A (not COVID19).    - symptomatic management w/ IVF resuscitation, tylenol, encouraging PO intake and supplemental O2 in form of HFNC   - BiPAP at night  - c/w zosyn for c/f of superimposed bacterial PNA  - holding vanc i/s/o worsening kidney function      #positive blood cultures  blood culture on 5/23 growing staph capitis and staph epidermidis (contaminated)      - repeat blood culture on 5/26 NGTD

## 2022-05-29 NOTE — OCCUPATIONAL THERAPY INITIAL EVALUATION ADULT - RANGE OF MOTION EXAMINATION, UPPER EXTREMITY
R shoulder unable to achieve flexion > 50 degrees; unable to achieve complete R elbow extension; L shoulder flexion ~120 degrees; L elbow WFL; b/l wrists WFL; digits able to achieve gross grasp WFL, however arthritis and deformities noted

## 2022-05-29 NOTE — PROGRESS NOTE ADULT - SUBJECTIVE AND OBJECTIVE BOX
KARINA OVIEDO, 89y, Female  MRN-[id number]  Patient is a 89y old  Female who presents with a chief complaint of SEPSIS; ACUTE UTI    ****Stepdown from MICU to [facility]****  Hospital Course: Ms. Oviedo is a 89 yo F with PMHx of HTN, HLD, CKD, hypothyroidism, COPD, Depression, Gout, R heel ulcer presented with non-bloody diarrhea along with abd pain and productive cough. Found with RVP positive for influenza A and coronavirus, GI PCR positive for campylobacter and UA positive. She was started on abx and admitted to [facility], but stepped up to MICU due to increased oxygen needs and increased work of breathing, as well as new pressor requirements for hypotension (though to be vasoplegic in nature). Stepped down to [facility] 5/25, and blood cultures from 5/23 growing coag negative staph, for which she received 1x dose of vancomycin. She was also started on duonebs and solumedrol for COPD exacerbation given new wheezing and respiratory distress. [facility] noted to have increased work of breathing with accessory muscle use, dyspneic and with audible wheezes. Patient stepped up to MICU again on 5/26 for further management. Patient obtunded and lethargic on MICU, intubated overnight 5/27 and subsequently extubated on 5/27 AM. Patient had extended conversation with Palliative Care. Patient was alert/awake and able to participate in GOC conversation. Patient stating she would not want CPR at end of life if clinical course deteriorated. MOLST filled out. Pt noted to have EF 10% which would qualify patient for home hospice on discharge. BCx from 5/23 noted to be positive for staph capitis -- unclear if contaminant v active infection at the time and started on Empiric Vancomycin (dosed by level) and Zosyn. Patient discontinued from vancomycin pm 5/29 given cleared BCx and active WAI/ worsening kidney function. Pt continued on Zosyn pseudomonal coverage for x5day course.  Patient seen by PT/OT and performing well. Patient doing well with BiPAP overnight and NC throughout the day. IF patient not tolerating BiPAP, can use HFNC for flow needs. Patient with arthritic pain managed well with IV Tylenol and/or Dilaudid Patient resumed on home Lasix 40mg BID i/s/o HFrEF. Patient with poor PO intake and mild hypernatremia noted on AM labs on 5/29. Avoiding excess fluids at this time, encouraging PO intake and lasix discontinued.  Complete 5 day pseudomonal coverage, vancomycin discontinued. Pt DNR/DNI. Tolerating alternating NC and BIPAP at bedtime. Patient seen/working with PT and OT. Stable for stepdown to [facility] for further management.   ON: Pt given IV tylenol 1g x1 and Dilaudid 0.25mg IV x1 for pain.     SUBJECTIVE: Pt seen/examined at bedside. Patient stating she is doing well. Pt stating she has some mild arthritic pain and she would like IV tylenol.     12 Point ROS Negative unless noted otherwise above.  -------------------------------------------------------------------------------  VITAL SIGNS:  Vital Signs Last 24 Hrs  T(C): 37.1 (29 May 2022 09:42), Max: 37.1 (29 May 2022 09:42)  T(F): 98.7 (29 May 2022 09:42), Max: 98.7 (29 May 2022 09:42)  HR: 100 (29 May 2022 09:22) (87 - 123)  BP: 157/90 (29 May 2022 09:22) (108/67 - 157/90)  BP(mean): 116 (29 May 2022 09:22) (83 - 116)  RR: 25 (29 May 2022 09:22) (0 - 36)  SpO2: 95% (29 May 2022 09:22) (93% - 100%)  I&O's Summary    28 May 2022 07:01  -  29 May 2022 07:00  --------------------------------------------------------  IN: 300 mL / OUT: 760 mL / NET: -460 mL        PHYSICAL EXAM:    General: frail; on NC; sitting up in bed   HEENT: NC/AT  Neck: supple  Cardiovascular: RRR, +S1/S2; NO M/R/G  Respiratory: mild bilateral ronchi; L>R  Gastrointestinal: soft, NT/ND; +BSx4  Extremities: WWP; mottling noted   Vascular: 2+ radial, DP/PT pulses B/L  Neurological: AAOx3    ALLERGIES:  Allergies    No Known Allergies    Intolerances        MEDICATIONS:  MEDICATIONS  (STANDING):  acetaminophen   IVPB .. 1000 milliGRAM(s) IV Intermittent once  acetylcysteine 20%  Inhalation 4 milliLiter(s) Inhalation every 4 hours  albuterol/ipratropium for Nebulization 3 milliLiter(s) Nebulizer every 4 hours  atorvastatin 40 milliGRAM(s) Oral at bedtime  chlorhexidine 2% Cloths 1 Application(s) Topical <User Schedule>  heparin   Injectable 5000 Unit(s) SubCutaneous every 8 hours  levothyroxine Injectable 60 MICROGram(s) IV Push at bedtime  methylPREDNISolone sodium succinate Injectable 40 milliGRAM(s) IV Push every 24 hours  metoprolol tartrate Injectable 2.5 milliGRAM(s) IV Push every 6 hours  pantoprazole  Injectable 40 milliGRAM(s) IV Push every 24 hours  piperacillin/tazobactam IVPB.. 4.5 Gram(s) IV Intermittent every 12 hours    MEDICATIONS  (PRN):  melatonin 3 milliGRAM(s) Oral at bedtime PRN Insomnia      -------------------------------------------------------------------------------  LABS:                        9.8    12.48 )-----------( 305      ( 29 May 2022 05:47 )             33.5     05-29    147<H>  |  105  |  53<H>  ----------------------------<  161<H>  4.1   |  18<L>  |  3.41<H>    Ca    9.0      29 May 2022 05:48  Phos  5.3     05-29  Mg     2.1     05-29    TPro  6.6  /  Alb  3.2<L>  /  TBili  0.7  /  DBili  x   /  AST  49<H>  /  ALT  43  /  AlkPhos  168<H>  05-29    LIVER FUNCTIONS - ( 29 May 2022 05:48 )  Alb: 3.2 g/dL / Pro: 6.6 g/dL / ALK PHOS: 168 U/L / ALT: 43 U/L / AST: 49 U/L / GGT: x               CAPILLARY BLOOD GLUCOSE      POCT Blood Glucose.: 172 mg/dL (28 May 2022 19:48)      Culture - Blood (collected 26 May 2022 17:45)  Source: .Blood Blood-Peripheral  Preliminary Report (28 May 2022 18:00):    No growth at 2 days.      SARS-CoV-2: NotDetec (23 May 2022 01:38)  COVID-19 PCR: NotDetec (11 Mar 2022 16:53)      RADIOLOGY & ADDITIONAL TESTS: Reviewed.

## 2022-05-29 NOTE — OCCUPATIONAL THERAPY INITIAL EVALUATION ADULT - ADDITIONAL COMMENTS
Patient reports living in an apt building with her , and has a HHA 24/7. Patient reports having a wheelchair which is used for community mobility. ? how patient completes functional mobility around her apt building as patient stating she "has a helper." Also unable to determine if patient has a commode/shower chair, as once again patient stating she, "has a helper."

## 2022-05-29 NOTE — OCCUPATIONAL THERAPY INITIAL EVALUATION ADULT - PERTINENT HX OF CURRENT PROBLEM, REHAB EVAL
89yo F PMH HTN HLD, CKD (baseline Cr ~3), hypothyroidism, COPD, Depression, Gout, R heel ulcer presented 5/23 w/ nonbloody diarrhea, found to be septic and positive for coronavirus and influenza A, as well as GI PCR+ for campylobacter, course further c/b staph bacteremia (?contaminant), now complicated by COPD exacerbation with RRT called in AM of 5/26.

## 2022-05-29 NOTE — PROGRESS NOTE ADULT - PROBLEM SELECTOR PLAN 2
H/o COPD. Patient denies this or any smoking history although per chart review patient has diagnosis. Per outpatient med review pt appears to be on Symbicort 160mcg-4.5mcg 1 puff BID at home  RRT called AM of 5/26 for respiratory distress, with diffuse expiratory wheezing on exam and accessory muscle use on HFNC, c/w acute exacerbation     - work of breathing improved on BiPAP -- continue BiPAP overnight   - given multiple viral infections and COPD history with new onset wheezing, most likely COPD exacerbation triggered by underlying infections   - also with copious mucus secretions (clear, apurulent) and some element of mucus plugging contributing to respiratory distress   - s/p dose of vanc for bacteremia (coag negative on PCR); level subsequently therapeutic given CKD, low concern that decompensation due to worsening bacterial process given adequate antimicrobial coverage and otherwise aseptic at this time. Given worsening creatinine and BCx likely contaminant, Vancomycin discontinued     - c/w duonebs standing q4h   - solumedrol 40 mg IVP x5 days for COPD exacerbation -- last day today

## 2022-05-29 NOTE — PROGRESS NOTE ADULT - SUBJECTIVE AND OBJECTIVE BOX
Patient is a 89y Female seen and evaluated at bedside seen this morning, states did not  get any sleep last night. Also endorses not eating or drinking anything last few days. Hgb 9.8, Na 147, K 4.1      Meds:    acetaminophen   IVPB .. 1000 once  acetylcysteine 20%  Inhalation 4 every 4 hours  albuterol/ipratropium for Nebulization 3 every 4 hours  atorvastatin 40 at bedtime  chlorhexidine 2% Cloths 1 <User Schedule>  heparin   Injectable 5000 every 8 hours  levothyroxine Injectable 60 at bedtime  melatonin 3 at bedtime PRN  methylPREDNISolone sodium succinate Injectable 40 every 24 hours  metoprolol tartrate Injectable 2.5 every 6 hours  pantoprazole  Injectable 40 every 24 hours  piperacillin/tazobactam IVPB.. 4.5 every 12 hours      T(C): , Max: 37.1 (05-29-22 @ 09:42)  T(F): , Max: 98.7 (05-29-22 @ 09:42)  HR: 100 (05-29-22 @ 09:22)  BP: 157/90 (05-29-22 @ 09:22)  BP(mean): 116 (05-29-22 @ 09:22)  RR: 25 (05-29-22 @ 09:22)  SpO2: 95% (05-29-22 @ 09:22)  Wt(kg): --    05-28 @ 07:01  -  05-29 @ 07:00  --------------------------------------------------------  IN: 300 mL / OUT: 760 mL / NET: -460 mL          Review of Systems:  ROS negative except as per HPI    PHYSICAL EXAM:  GENERAL: NAD on NC  NECK: supple, No JVD  CHEST/LUNG: b/l mild wheezing appreciated  HEART: tachycardic no m/r/g  ABDOMEN: Soft, Nontender, +BS, No flank tenderness bilateral  EXTREMITIES: cool, dry no edema appreciated      LABS:                        9.8    12.48 )-----------( 305      ( 29 May 2022 05:47 )             33.5     05-29    147<H>  |  105  |  53<H>  ----------------------------<  161<H>  4.1   |  18<L>  |  3.41<H>    Ca    9.0      29 May 2022 05:48  Phos  5.3     05-29  Mg     2.1     05-29    TPro  6.6  /  Alb  3.2<L>  /  TBili  0.7  /  DBili  x   /  AST  49<H>  /  ALT  43  /  AlkPhos  168<H>  05-29                RADIOLOGY & ADDITIONAL STUDIES:

## 2022-05-29 NOTE — OCCUPATIONAL THERAPY INITIAL EVALUATION ADULT - LEVEL OF INDEPENDENCE: DRESS UPPER BODY, OT EVAL
donning UB garment; requires increased assistance due to limited shoulder ROM/minimum assist (75% patients effort)

## 2022-05-29 NOTE — OCCUPATIONAL THERAPY INITIAL EVALUATION ADULT - MODALITIES TREATMENT COMMENTS
patient completed x1 trial sist<>stand with 2 person assist, poor standing tolerance and increased fatigue requesting to return to bed.

## 2022-05-29 NOTE — PROGRESS NOTE ADULT - ASSESSMENT
89 yo F PMH HTN HLD, CKD (baseline Cr ~3), hypothyroidism, COPD, Depression, Gout, R heel ulcer presented 5/23 w/ nonbloody diarrhea, found to be septic and positive for coronavirus and influenza A, as well as GI PCR+ for campylobacter, course further c/b staph bacteremia (?contaminant), now complicated by COPD exacerbation

## 2022-05-29 NOTE — PROGRESS NOTE ADULT - ASSESSMENT
Ms. Mi is a 89yo F PMH HTN HLD, CKD (baseline Cr ~3), hypothyroidism, COPD, Depression, Gout, R heel ulcer presented 5/23 w/ nonbloody diarrhea, found to be septic and positive for coronavirus and influenza A, as well as GI PCR+ for campylobacter, course further c/b staph bacteremia (?contaminant), now complicated by COPD exacerbation with RRT called in AM of 5/26. Stepped up to MICU for closer respiratory monitoring.     Neuro    #Awake/Alert  - extubated on 5/27     Pulmonology  #Upper respiratory infection with Coronavirus    Endorses cough for past 2-3 days and RVP+ for coronavirus and influenza A (not COVID19).  concern for COPD exacerbation   - symptomatic management w/ IVF resuscitation, tylenol, encouraging PO intake and supplemental O2 in form of HFNC   - BiPAP at night     #COPD, with acute exacerbation and acute on chronic respiratory failure    H/o COPD. Patient denies this or any smoking history although per chart review patient has diagnosis. Per outpatient med review pt appears to be on Symbicort 160mcg-4.5mcg 1 puff BID at home  RRT called AM of 5/26 for respiratory distress, with diffuse expiratory wheezing on exam and accessory muscle use on HFNC, c/w acute exacerbation   - work of breathing improved on BiPAP -- continue BiPAP overnight   - given multiple viral infections and COPD history with new onset wheezing, most likely COPD exacerbation triggered by underlying infections   - also with copious mucus secretions (clear, apurulent) and some element of mucus plugging contributing to respiratory distress   - s/p dose of vanc for bacteremia (coag negative on PCR); level subsequently therapeutic given CKD, low concern that decompensation due to worsening bacterial process given adequate antimicrobial coverage and otherwise aseptic at this time. Given worsening creatinine and BCx likely contaminant, Vancomycin discontinued   - c/w duonebs standing q4h   - solumedrol 40 mg IVP x5 days for COPD exacerbation -- last day today       Cardiac    #HFrEF  TTE showing multiple segmental abnormalities, mid and apical LV segments are akinetic, normal RV size and systolic function. Moderate/severe TR with moderate MR. LV systolic function severely reduced with EF 10-15% with regional wall motion abnormalities   - Pt on home lasix 40mg qd -- discontinue at this time i/s/o worsening Cr for poor PO intake. Pt euvolemic at this time. Re-evaluate for resumption daily   - Pt might be discharged with home w/ hospice services per palliative given EF   - f/u recs and avoid fluids      #HTN (hypertension).   Known h/o HTN, takes Valsartan 320 mg qd, furosemide 20 mg qd  - patient no longer requiring Levophed since 7pm on 5/24 -- maintaining MAPs >65   - resume home meds as appropriate   - normotensive at this time     #HLD  Known h/o HLD, takes rosuvastatin 10mg qhs at home.  - c/w atorvastatin 40 mg qhs.    GI  #Campylobacter Infection -- RESOLVED   Presented w/ 2-3d h/o nonbloody diarrhea. Likely 2/2 underlying coronavirus infection although cannot r/o independent GI infection. Pt is diffusely tender to palpation without rebound tenderness on abdominal exam. Campylobacter detected on GI PCR. Pt with low bicarb 2/2 diarrhea. S/p bicarb gtt w/ 3 amps bicarb on 5/23.   - completed adequate treatment for campylobacter   - diarrhea RESOLVED     ID  #Severe sepsis 2/2 Campylobacter, Coronavirus and Influenza infections; ?staph bacteremia -- RESOLVED  Met 3/4 SIRS criteria on admission (T 101.9, , RR 22) w/ lactate 2.1-->3.0 w/ +coronavirus (not COVID-19). Sepsis suspected 2/2 severe dehydration from poor PO intake i/s/o coronavirus infection along with fluid loss via diarrhea and vomiting. Also flu A and campylobacter +. Blood cultures from 5/23 subsequently with growth of coag negative staph on 5/25  - treat viral respiratory illness and campylobacter as above  - monitor strict Is/Os   - unclear whether coag negative staph contaminant (given it was only in one bottle and she is otherwise aseptic) vs true bacteremia given decompensation; s/p 1x dose of vancomycin wiht levels therapeutic given CKD and reduced clearance   - BCx (5/26): ngtd  - vancomycin discontinued at this time  - continue to complete 5 day course of Zosyn      Renal  CKD (chronic kidney disease).    Presented with Cr 3.22 which appears to be close to baseline. Currently producing urine.  - Cr 3.41 on AM labs -- likely i/s/o lasix resumption and poor PO intake. Lasix discontinued.   - Currently at baseline    - avoid nephrotoxic medications  - strict Is/Os with simpson in place  - maintain MAP goal >60; off of pressors at the moment      MSK   #Heel ulceration.   Has known chronic R heel ulceration. Follows with Dr. Oliva (surgery). Currently wrapped in clean kerlix.  - wound care nurse consult while inpatient -- f/u recs      Endocrine   # Hypothyroidism.    - c/w synthroid 75 mcg qd.    GOC Conversation  GOC conversation occured between patient and palliative at bedside. Pt stating she would not want the tube to be replaced if respiratory status declined, but would rather want O2 and medications to assist with comfort. MOLST filled out and updated. Pt with EF 10% -- would quality with home w/ hospice upon DC  - Pt DNR/DNI   - ongoing GOC conversation with palliative     Dispo: MICU --> [facility]   Code: DNR/DNI   Diet: dysphagia eval  DVT Proph: Heparin subq

## 2022-05-29 NOTE — OCCUPATIONAL THERAPY INITIAL EVALUATION ADULT - GENERAL OBSERVATIONS, REHAB EVAL
Patient received semi-supine in bed, +tele, +HF NC 40L/45%; +B/L CAIR boots, +purewick, A&Ox4, in NAD and agreeable to OT session.

## 2022-05-29 NOTE — PROGRESS NOTE ADULT - ASSESSMENT
89F with pmhx of CKD V (baseline Cr 3.5), HTN, depression who is presenting for 2-3 days of diarrhea and URI like symptoms. Nephrology consulted for CKD management and acidosis    Assessment/Plan:   #CKD Stage V (Baseline Cr 3.5)  #Hypernatremia  Creatinine remains stable although uptrending slightly. Sodium noted to be 147. Likely from poor PO intake and insensible losses. Pt with EF of about 10% and so would consider giving D5W at a very slow rate in small aliquots  -Trend BMP daily  -Consider 250cc of D5w @ 50cc/hr and re-assess response  -Avoid nephrotoxic meds  -Monitor I/O's  -Renal diet    Tonya Sims D.O.  PGY 4 - Nephrology Fellow  [phone]

## 2022-05-29 NOTE — PROGRESS NOTE ADULT - ATTENDING COMMENTS
Acute hypoxemic respiratory failure 2/2 non SARS COV2 coronavirus, influenza A c/b campylobacter diarrhea. Returned to MICU with worsening respiratory status, required mechanical ventilation for WOB. Doing well post extubation; on HFNC. Finish 5 days of abx. Now DNR/DNI. Daily assessment for diuresis. Pulmonary toilet. Step down eligible.

## 2022-05-29 NOTE — PROGRESS NOTE ADULT - PROBLEM SELECTOR PLAN 4
TTE showing multiple segmental abnormalities, mid and apical LV segments are akinetic, normal RV size and systolic function. Moderate/severe TR with moderate MR. LV systolic function severely reduced with EF 10-15% with regional wall motion abnormalities     - Pt on home lasix 40mg qd -- discontinue at this time i/s/o worsening Cr for poor PO intake. Pt euvolemic at this time. Re-evaluate for resumption daily   - Pt might be discharged with home w/ hospice services per palliative given EF   - f/u recs and avoid fluids

## 2022-05-29 NOTE — OCCUPATIONAL THERAPY INITIAL EVALUATION ADULT - DIAGNOSIS, OT EVAL
OT deficits include impaired BUE ROM/strength with chronic arthritis, presenting with digit deformities, impaired balance, overall deconditioning, affecting ability to complete ADL, functional transfers and mobility.

## 2022-05-29 NOTE — PROGRESS NOTE ADULT - SUBJECTIVE AND OBJECTIVE BOX
ACCEPTANCE NOTE FROM MICU TO [address] [place] [address] COURSE  89 yo F with PMHx of HTN, HLD, CKD, hypothyroidism, COPD, Depression, Gout, R heel ulcer presented with non-bloody diarrhea along with abd pain and productive cough. Found with RVP positive for influenza A and coronavirus, GI PCR positive for campylobacter and UA positive. She was started on abx and admitted to [facility], but stepped up to MICU due to increased oxygen needs and increased work of breathing, as well as new pressor requirements for hypotension (though to be vasoplegic in nature). Stepped down to [facility] 5/25, and blood cultures from 5/23 growing coag negative staph, for which she received 1x dose of vancomycin. She was also started on duonebs and solumedrol for COPD exacerbation given new wheezing and respiratory distress.     [facility] noted to have increased work of breathing with accessory muscle use, dyspneic and with audible wheezes. Patient stepped up to MICU again on 5/26 for further management. Patient obtunded and lethargic on MICU, intubated overnight 5/27 and subsequently extubated on 5/27 AM. Patient had extended conversation with Palliative Care. Patient was alert/awake and able to participate in GOC conversation. Patient stating she would not want CPR at end of life if clinical course deteriorated. MOLST filled out. Pt noted to have EF 10% which would qualify patient for home hospice on discharge. BCx from 5/23 noted to be positive for staph capitis -- unclear if contaminant v active infection at the time and started on Empiric Vancomycin (dosed by level) and Zosyn. Patient discontinued from vancomycin pm 5/29 given cleared BCx and active WAI/ worsening kidney function. Pt continued on Zosyn pseudomonal coverage for x5day course.  Patient seen by PT/OT and performing well. Patient doing well with BiPAP overnight and NC throughout the day. IF patient not tolerating BiPAP, can use HFNC for flow needs. Patient with arthritic pain managed well with IV Tylenol and/or Dilaudid Patient resumed on home Lasix 40mg BID i/s/o HFrEF. Patient with poor PO intake and mild hypernatremia noted on AM labs on 5/29. Avoiding excess fluids at this time, encouraging PO intake and lasix discontinued.  Complete 5 day pseudomonal coverage, vancomycin discontinued. Pt DNR/DNI. Tolerating alternating NC and BIPAP at bedtime. Patient seen/working with PT and OT. Stable for stepdown to [facility] for further management.     OVERNIGHT: Pt given IV tylenol 1g x1 and Dilaudid 0.25mg IV x1 for pain.     SUBJECTIVE: Pt seen/examined at bedside. Patient stating she is doing well. Pt stating she has some mild arthritic pain and she would like IV tylenol.     12 Point ROS Negative unless noted otherwise above.  -------------------------------------------------------------------------------  VITAL SIGNS:  Vital Signs Last 24 Hrs  T(C): 37.1 (29 May 2022 09:42), Max: 37.1 (29 May 2022 09:42)  T(F): 98.7 (29 May 2022 09:42), Max: 98.7 (29 May 2022 09:42)  HR: 100 (29 May 2022 09:22) (87 - 123)  BP: 157/90 (29 May 2022 09:22) (108/67 - 157/90)  BP(mean): 116 (29 May 2022 09:22) (83 - 116)  RR: 25 (29 May 2022 09:22) (0 - 36)  SpO2: 95% (29 May 2022 09:22) (93% - 100%)  I&O's Summary    28 May 2022 07:01  -  29 May 2022 07:00  --------------------------------------------------------  IN: 300 mL / OUT: 760 mL / NET: -460 mL        PHYSICAL EXAM:    General: frail; on NC; sitting up in bed   HEENT: NC/AT  Neck: supple  Cardiovascular: RRR, +S1/S2; NO M/R/G  Respiratory: mild bilateral ronchi; L>R  Gastrointestinal: soft, NT/ND; +BSx4  Extremities: WWP; mottling noted   Vascular: 2+ radial, DP/PT pulses B/L  Neurological: AAOx3    ALLERGIES:  Allergies    No Known Allergies    Intolerances        MEDICATIONS:  MEDICATIONS  (STANDING):  acetaminophen   IVPB .. 1000 milliGRAM(s) IV Intermittent once  acetylcysteine 20%  Inhalation 4 milliLiter(s) Inhalation every 4 hours  albuterol/ipratropium for Nebulization 3 milliLiter(s) Nebulizer every 4 hours  atorvastatin 40 milliGRAM(s) Oral at bedtime  chlorhexidine 2% Cloths 1 Application(s) Topical <User Schedule>  heparin   Injectable 5000 Unit(s) SubCutaneous every 8 hours  levothyroxine Injectable 60 MICROGram(s) IV Push at bedtime  methylPREDNISolone sodium succinate Injectable 40 milliGRAM(s) IV Push every 24 hours  metoprolol tartrate Injectable 2.5 milliGRAM(s) IV Push every 6 hours  pantoprazole  Injectable 40 milliGRAM(s) IV Push every 24 hours  piperacillin/tazobactam IVPB.. 4.5 Gram(s) IV Intermittent every 12 hours    MEDICATIONS  (PRN):  melatonin 3 milliGRAM(s) Oral at bedtime PRN Insomnia      -------------------------------------------------------------------------------  LABS:                        9.8    12.48 )-----------( 305      ( 29 May 2022 05:47 )             33.5     05-29    147<H>  |  105  |  53<H>  ----------------------------<  161<H>  4.1   |  18<L>  |  3.41<H>    Ca    9.0      29 May 2022 05:48  Phos  5.3     05-29  Mg     2.1     05-29    TPro  6.6  /  Alb  3.2<L>  /  TBili  0.7  /  DBili  x   /  AST  49<H>  /  ALT  43  /  AlkPhos  168<H>  05-29    LIVER FUNCTIONS - ( 29 May 2022 05:48 )  Alb: 3.2 g/dL / Pro: 6.6 g/dL / ALK PHOS: 168 U/L / ALT: 43 U/L / AST: 49 U/L / GGT: x               CAPILLARY BLOOD GLUCOSE      POCT Blood Glucose.: 172 mg/dL (28 May 2022 19:48)      Culture - Blood (collected 26 May 2022 17:45)  Source: .Blood Blood-Peripheral  Preliminary Report (28 May 2022 18:00):    No growth at 2 days.      SARS-CoV-2: NotDetec (23 May 2022 01:38)  COVID-19 PCR: NotDetec (11 Mar 2022 16:53)      RADIOLOGY & ADDITIONAL TESTS: Reviewed.      ACCEPTANCE NOTE FROM MICU TO [address] [place] [address] COURSE  89 yo F with PMHx of HTN, HLD, CKD, hypothyroidism, COPD, Depression, Gout, R heel ulcer presented with non-bloody diarrhea along with abd pain and productive cough. Found with RVP positive for influenza A and coronavirus, GI PCR positive for campylobacter and UA positive. She was started on abx and admitted to [facility], but stepped up to MICU due to increased oxygen needs and increased work of breathing, as well as new pressor requirements for hypotension (though to be vasoplegic in nature). Stepped down to [facility] 5/25, and blood cultures from 5/23 growing coag negative staph, for which she received 1x dose of vancomycin. She was also started on duonebs and solumedrol for COPD exacerbation given new wheezing and respiratory distress.     [facility] noted to have increased work of breathing with accessory muscle use, dyspneic and with audible wheezes. Patient stepped up to MICU again on 5/26 for further management. Patient obtunded and lethargic on MICU, intubated overnight 5/27 and subsequently extubated on 5/27 AM. Patient had extended conversation with Palliative Care. Patient was alert/awake and able to participate in GOC conversation. Patient stating she would not want CPR at end of life if clinical course deteriorated. MOLST filled out. Pt noted to have EF 10% which would qualify patient for home hospice on discharge. BCx from 5/23 noted to be positive for staph capitis -- unclear if contaminant v active infection at the time and started on Empiric Vancomycin (dosed by level) and Zosyn. Patient discontinued from vancomycin pm 5/29 given cleared BCx and active WAI/ worsening kidney function. Pt continued on Zosyn pseudomonal coverage for x5day course.      Patient seen by PT/OT and performing well. Patient doing well with BiPAP overnight and NC throughout the day. IF patient not tolerating BiPAP, can use HFNC for flow needs. Patient with arthritic pain managed well with IV Tylenol and/or Dilaudid Patient resumed on home Lasix 40mg BID i/s/o HFrEF. Patient with poor PO intake and mild hypernatremia noted on AM labs on 5/29. Avoiding excess fluids at this time, encouraging PO intake and lasix discontinued.  Complete 5 day pseudomonal coverage, vancomycin discontinued. Pt DNR/DNI. Tolerating alternating NC and BIPAP at bedtime. Patient seen/working with PT and OT. Stable for stepdown to [facility] for further management.     OVERNIGHT: Pt given IV tylenol 1g x1 and Dilaudid 0.25mg IV x1 for pain.     SUBJECTIVE: Pt seen/examined at bedside. Patient stating she is doing well. Pt stating she has some mild arthritic pain and she would like IV tylenol.     12 Point ROS Negative unless noted otherwise above.  -------------------------------------------------------------------------------  VITAL SIGNS:  Vital Signs Last 24 Hrs  T(C): 37.1 (29 May 2022 09:42), Max: 37.1 (29 May 2022 09:42)  T(F): 98.7 (29 May 2022 09:42), Max: 98.7 (29 May 2022 09:42)  HR: 100 (29 May 2022 09:22) (87 - 123)  BP: 157/90 (29 May 2022 09:22) (108/67 - 157/90)  BP(mean): 116 (29 May 2022 09:22) (83 - 116)  RR: 25 (29 May 2022 09:22) (0 - 36)  SpO2: 95% (29 May 2022 09:22) (93% - 100%)  I&O's Summary    28 May 2022 07:01  -  29 May 2022 07:00  --------------------------------------------------------  IN: 300 mL / OUT: 760 mL / NET: -460 mL        PHYSICAL EXAM:    General: frail; on NC; sitting up in bed   HEENT: NC/AT  Neck: supple  Cardiovascular: RRR, +S1/S2; NO M/R/G  Respiratory: mild bilateral ronchi; L>R  Gastrointestinal: soft, NT/ND; +BSx4  Extremities: WWP; mottling noted   Vascular: 2+ radial, DP/PT pulses B/L  Neurological: AAOx3    ALLERGIES:  Allergies    No Known Allergies    Intolerances        MEDICATIONS:  MEDICATIONS  (STANDING):  acetaminophen   IVPB .. 1000 milliGRAM(s) IV Intermittent once  acetylcysteine 20%  Inhalation 4 milliLiter(s) Inhalation every 4 hours  albuterol/ipratropium for Nebulization 3 milliLiter(s) Nebulizer every 4 hours  atorvastatin 40 milliGRAM(s) Oral at bedtime  chlorhexidine 2% Cloths 1 Application(s) Topical <User Schedule>  heparin   Injectable 5000 Unit(s) SubCutaneous every 8 hours  levothyroxine Injectable 60 MICROGram(s) IV Push at bedtime  methylPREDNISolone sodium succinate Injectable 40 milliGRAM(s) IV Push every 24 hours  metoprolol tartrate Injectable 2.5 milliGRAM(s) IV Push every 6 hours  pantoprazole  Injectable 40 milliGRAM(s) IV Push every 24 hours  piperacillin/tazobactam IVPB.. 4.5 Gram(s) IV Intermittent every 12 hours    MEDICATIONS  (PRN):  melatonin 3 milliGRAM(s) Oral at bedtime PRN Insomnia      -------------------------------------------------------------------------------  LABS:                        9.8    12.48 )-----------( 305      ( 29 May 2022 05:47 )             33.5     05-29    147<H>  |  105  |  53<H>  ----------------------------<  161<H>  4.1   |  18<L>  |  3.41<H>    Ca    9.0      29 May 2022 05:48  Phos  5.3     05-29  Mg     2.1     05-29    TPro  6.6  /  Alb  3.2<L>  /  TBili  0.7  /  DBili  x   /  AST  49<H>  /  ALT  43  /  AlkPhos  168<H>  05-29    LIVER FUNCTIONS - ( 29 May 2022 05:48 )  Alb: 3.2 g/dL / Pro: 6.6 g/dL / ALK PHOS: 168 U/L / ALT: 43 U/L / AST: 49 U/L / GGT: x               CAPILLARY BLOOD GLUCOSE      POCT Blood Glucose.: 172 mg/dL (28 May 2022 19:48)      Culture - Blood (collected 26 May 2022 17:45)  Source: .Blood Blood-Peripheral  Preliminary Report (28 May 2022 18:00):    No growth at 2 days.      SARS-CoV-2: NotDetec (23 May 2022 01:38)  COVID-19 PCR: NotDetec (11 Mar 2022 16:53)      RADIOLOGY & ADDITIONAL TESTS: Reviewed.

## 2022-05-30 NOTE — PROGRESS NOTE ADULT - PROBLEM SELECTOR PLAN 9
GOC conversation occured between patient and palliative at bedside. Pt stating she would not want the tube to be replaced if respiratory status declined, but would rather want O2 and medications to assist with comfort. MOLST filled out and updated. Pt with EF 10% -- would quality with home w/ hospice upon DC    - Pt DNR/DNI   - ongoing GOC conversation with palliative
- c/w synthroid 75 mcg qd
GOC conversation occured between patient and palliative at bedside. Pt stating she would not want the tube to be replaced if respiratory status declined, but would rather want O2 and medications to assist with comfort. MOLST filled out and updated. Pt with EF 10% -- would quality with home w/ hospice upon DC    - Pt DNR/DNI   - ongoing GOC conversation with palliative
- c/w synthroid 75 mcg qd

## 2022-05-30 NOTE — PROGRESS NOTE ADULT - ATTENDING COMMENTS
I:   Hypernatremia, creatinine up to 3.86. On nasal BIPAP.   A: Worsened WAI.  P: Comfort care measures.

## 2022-05-30 NOTE — PROGRESS NOTE ADULT - PROBLEM SELECTOR PLAN 4
TTE showing multiple segmental abnormalities, mid and apical LV segments are akinetic, normal RV size and systolic function. Moderate/severe TR with moderate MR. LV systolic function severely reduced with EF 10-15% with regional wall motion abnormalities     - Pt on home lasix 40mg qd -- discontinue at this time i/s/o worsening Cr for poor PO intake. Pt euvolemic at this time. Re-evaluate for resumption daily   - Pt might be discharged with home w/ hospice services per palliative given EF   - f/u recs and avoid fluids TTE showing multiple segmental abnormalities, mid and apical LV segments are akinetic, normal RV size and systolic function. Moderate/severe TR with moderate MR. LV systolic function severely reduced with EF 10-15% with regional wall motion abnormalities     - Pt on home lasix 40mg qd -- discontinue at this time i/s/o worsening Cr for poor PO intake. Pt euvolemic at this time. Re-evaluate for resumption daily   - Pt might be discharged with home w/ hospice services per palliative given EF   - f/u recs and avoid fluids    #Afib with RVR  episode of Afib on 5/30 AM, received met tartrate 2.5 mg ---> NSR, HR 80s  - c/w met tartrate 5 mg q6h F: None (EF 10%)  E: replete PRN  Diet: full liquid  DVT PPx: None  GI PPx: PPI  Code: DNR/DNI, comfort care  Dispo: -Located within [facility]

## 2022-05-30 NOTE — PROGRESS NOTE ADULT - PROBLEM SELECTOR PROBLEM 9
Goals of care, counseling/discussion
Hypothyroidism
Hypothyroidism
Goals of care, counseling/discussion

## 2022-05-30 NOTE — PROGRESS NOTE ADULT - PROBLEM SELECTOR PLAN 2
H/o COPD. Patient denies this or any smoking history although per chart review patient has diagnosis. Per outpatient med review pt appears to be on Symbicort 160mcg-4.5mcg 1 puff BID at home  RRT called AM of 5/26 for respiratory distress, with diffuse expiratory wheezing on exam and accessory muscle use on HFNC, c/w acute exacerbation     - work of breathing improved on BiPAP -- continue BiPAP overnight   - given multiple viral infections and COPD history with new onset wheezing, most likely COPD exacerbation triggered by underlying infections   - also with copious mucus secretions (clear, apurulent) and some element of mucus plugging contributing to respiratory distress   - s/p dose of vanc for bacteremia (coag negative on PCR); level subsequently therapeutic given CKD, low concern that decompensation due to worsening bacterial process given adequate antimicrobial coverage and otherwise aseptic at this time. Given worsening creatinine and BCx likely contaminant, Vancomycin discontinued     - c/w duonebs standing q4h   - solumedrol 40 mg IVP x5 days for COPD exacerbation -- last day today H/o COPD. Patient denies this or any smoking history although per chart review patient has diagnosis. Per outpatient med review pt appears to be on Symbicort 160mcg-4.5mcg 1 puff BID at home  RRT called AM of 5/26 for respiratory distress, with diffuse expiratory wheezing on exam and accessory muscle use on HFNC, c/w acute exacerbation     - work of breathing improved on BiPAP -- continue BiPAP overnight   - given multiple viral infections and COPD history with new onset wheezing, most likely COPD exacerbation triggered by underlying infections   - also with copious mucus secretions (clear, apurulent) and some element of mucus plugging contributing to respiratory distress   - s/p dose of vanc for bacteremia (coag negative on PCR); level subsequently therapeutic given CKD, low concern that decompensation due to worsening bacterial process given adequate antimicrobial coverage and otherwise aseptic at this time. Given worsening creatinine and BCx likely contaminant, Vancomycin discontinued     - c/w duonebs standing q4h TTE showing multiple segmental abnormalities, mid and apical LV segments are akinetic, normal RV size and systolic function. Moderate/severe TR with moderate MR. LV systolic function severely reduced with EF 10-15% with regional wall motion abnormalities     - avoid fluids    #Afib with RVR  episode of Afib on 5/30 AM, received met tartrate 2.5 mg ---> NSR, HR 80s  - c/w met tartrate 5 mg q6h

## 2022-05-30 NOTE — PROGRESS NOTE ADULT - ATTENDING SUPERVISION STATEMENT
This nurse left message for patient to return call to clinic to schedule labs (entered in epic) and NP appt (via virtual or audio.  
Fellow
Resident

## 2022-05-30 NOTE — GOALS OF CARE CONVERSATION - ADVANCED CARE PLANNING - CONVERSATION DETAILS
GOC discussion w/ family (Dr. Soares - / HCP, pt's daughter) held at bedside given pt's worsening clinical status. Dr. Mi confirmed that ongoing medical management (Antibiotics, BiPAP/HF and pulmonary toilet) and further escalation of care would not be consistent with patient's living will given overall poor prognosis. Therefore, it was determined to make pt comfort care only. GOC discussion w/ family (Dr. Soares - / HCP, pt's daughter) held at bedside given pt's worsening clinical status. Dr. Mi confirmed that ongoing medical management (Antibiotics, BiPAP/HF, frequent suctioning) and further escalation of care would not be consistent with patient's living will given overall poor prognosis. Therefore, it was determined to make pt comfort care only.

## 2022-05-30 NOTE — PROGRESS NOTE ADULT - PROBLEM SELECTOR PLAN 10
H/o COPD. Patient denies this or any smoking history although per chart review patient has diagnosis.   Per outpatient med review pt appears to be on Symbicort 160mcg-4.5mcg 1 puff BID at home.   Now on NC     H/o depression though denies this diagnosis.   Per chart review patient has been rx'd Duloxetine DR 30mg qd, Desipramine 10mg qd
F: None (EF 10%)  E: replete PRN  Diet: full liquid  DVT PPx: heparin 5k subq q8h  GI PPx: PPI  Code: DNR/DNI  Dispo: [facility]
H/o COPD. Patient denies this or any smoking history although per chart review patient has diagnosis. Per outpatient med review pt appears to be on Symbicort 160mcg-4.5mcg 1 puff BID at home.    - Monitor  - obtain collateral    H/o depression though denies this diagnosis. Per chart review patient has been rx'd Duloxetine DR 30mg qd, Desipramine 10mg qd    - obtain collateral from [facility] and/or PCP
F: None (EF 10%)  E: replete PRN  Diet: full liquid  DVT PPx: heparin 5k subq q8h  GI PPx: PPI  Code: DNR/DNI  Dispo: [facility]

## 2022-05-30 NOTE — PROGRESS NOTE ADULT - SUBJECTIVE AND OBJECTIVE BOX
**Incomplete Note**   **Incomplete Note**  OVERNIGHT EVENTS:  1. Pt tachycardic to 120-130s, pt denied CP, EKG showed afib with RVR ---> received metoprolol tartrate 2.5 mg ---> NSR, HR 80s.     SUBJECTIVE / INTERVAL HPI: Patient seen and examined at bedside. Pt on BiPAP, comfortable.     MEDICATIONS  (STANDING):  acetylcysteine 20%  Inhalation 4 milliLiter(s) Inhalation every 4 hours  albuterol/ipratropium for Nebulization 3 milliLiter(s) Nebulizer every 4 hours  atorvastatin 40 milliGRAM(s) Oral at bedtime  heparin   Injectable 5000 Unit(s) SubCutaneous every 8 hours  levothyroxine Injectable 60 MICROGram(s) IV Push at bedtime  metoprolol tartrate Injectable 2.5 milliGRAM(s) IV Push every 6 hours  pantoprazole  Injectable 40 milliGRAM(s) IV Push every 24 hours  piperacillin/tazobactam IVPB.. 4.5 Gram(s) IV Intermittent every 12 hours    MEDICATIONS  (PRN):  melatonin 3 milliGRAM(s) Oral at bedtime PRN Insomnia    Allergies    No Known Allergies    Intolerances        VITAL SIGNS:  Vital Signs Last 24 Hrs  T(C): 37.7 (30 May 2022 06:20), Max: 37.7 (30 May 2022 06:20)  T(F): 99.8 (30 May 2022 06:20), Max: 99.8 (30 May 2022 06:20)  HR: 94 (30 May 2022 08:13) (90 - 132)  BP: 134/76 (30 May 2022 08:13) (129/76 - 157/90)  BP(mean): 100 (30 May 2022 08:13) (94 - 116)  RR: 18 (30 May 2022 08:13) (16 - 37)  SpO2: 95% (30 May 2022 08:13) (91% - 99%)      05-29-22 @ 07:01  -  05-30-22 @ 07:00  --------------------------------------------------------  IN: 100 mL / OUT: 150 mL / NET: -50 mL        PHYSICAL EXAM:  General: NAD, Laying comfortably in bed  HEENT: NC/AT, anicteric sclera, MMM  Neck: supple  Cardiovascular: +S1/S2, RRR, No murmurs, rubs, gallops  Respiratory: CTA B/L, no W/R/R  Gastrointestinal: soft, NT/ND, +BSx4  Extremities: WWP, no edema, clubbing or cyanosis  Vascular: 2+ radial, DP/PT pulses B/L  Neurological: AAOx3, no focal deficits      LABS:                        9.6    11.00 )-----------( 298      ( 30 May 2022 05:55 )             31.2     05-30    141  |  104  |  59<H>  ----------------------------<  218<H>  4.2   |  18<L>  |  3.86<H>    Ca    8.8      30 May 2022 05:55  Phos  5.2     05-30  Mg     2.1     05-30    TPro  6.2  /  Alb  3.1<L>  /  TBili  0.6  /  DBili  x   /  AST  56<H>  /  ALT  44  /  AlkPhos  173<H>  05-30        CAPILLARY BLOOD GLUCOSE              RADIOLOGY & ADDITIONAL TESTS: Reviewed.   OVERNIGHT EVENTS:  1. Pt tachycardic to 120-130s, pt denied CP, EKG showed afib with RVR ---> received metoprolol tartrate 2.5 mg ---> NSR, HR 80s.     SUBJECTIVE / INTERVAL HPI: Patient seen and examined at bedside. Pt on BiPAP, comfortable. Opened eyes to voice and tracking. Pt denied any pain.     Pt removed from BiPAP, and placed on 6L NC for breakfast. However, iWOB and oxygen sat down to 70s, placed back on BiPAP.     MEDICATIONS  (STANDING):  acetylcysteine 20%  Inhalation 4 milliLiter(s) Inhalation every 4 hours  albuterol/ipratropium for Nebulization 3 milliLiter(s) Nebulizer every 4 hours  atorvastatin 40 milliGRAM(s) Oral at bedtime  heparin   Injectable 5000 Unit(s) SubCutaneous every 8 hours  levothyroxine Injectable 60 MICROGram(s) IV Push at bedtime  metoprolol tartrate Injectable 2.5 milliGRAM(s) IV Push every 6 hours  pantoprazole  Injectable 40 milliGRAM(s) IV Push every 24 hours  piperacillin/tazobactam IVPB.. 4.5 Gram(s) IV Intermittent every 12 hours    MEDICATIONS  (PRN):  melatonin 3 milliGRAM(s) Oral at bedtime PRN Insomnia    Allergies    No Known Allergies    Intolerances        VITAL SIGNS:  Vital Signs Last 24 Hrs  T(C): 37.7 (30 May 2022 06:20), Max: 37.7 (30 May 2022 06:20)  T(F): 99.8 (30 May 2022 06:20), Max: 99.8 (30 May 2022 06:20)  HR: 94 (30 May 2022 08:13) (90 - 132)  BP: 134/76 (30 May 2022 08:13) (129/76 - 157/90)  BP(mean): 100 (30 May 2022 08:13) (94 - 116)  RR: 18 (30 May 2022 08:13) (16 - 37)  SpO2: 95% (30 May 2022 08:13) (91% - 99%)      05-29-22 @ 07:01  -  05-30-22 @ 07:00  --------------------------------------------------------  IN: 100 mL / OUT: 150 mL / NET: -50 mL        PHYSICAL EXAM:  General: NAD, on BiPAP  HEENT: NC/AT, anicteric sclera  Neck: supple  Cardiovascular: +S1/S2, RRR, No murmurs, rubs, gallops  Respiratory: CTA B/L, no W/R/R  Gastrointestinal: soft, NT/ND, +BSx4  Extremities: WWP, no clubbing or cyanosis, 1+ edema in b/l UE and LE, mottling noted  Vascular: 2+ radial, DP/PT pulses B/L  Neurological: AAOx2, no focal deficits      LABS:                        9.6    11.00 )-----------( 298      ( 30 May 2022 05:55 )             31.2     05-30    141  |  104  |  59<H>  ----------------------------<  218<H>  4.2   |  18<L>  |  3.86<H>    Ca    8.8      30 May 2022 05:55  Phos  5.2     05-30  Mg     2.1     05-30    TPro  6.2  /  Alb  3.1<L>  /  TBili  0.6  /  DBili  x   /  AST  56<H>  /  ALT  44  /  AlkPhos  173<H>  05-30        CAPILLARY BLOOD GLUCOSE              RADIOLOGY & ADDITIONAL TESTS: Reviewed.   HOSPITAL COURSE  89 yo F with PMHx of HTN, HLD, CKD, hypothyroidism, COPD, Depression, Gout, R heel ulcer presented with non-bloody diarrhea along with abd pain and productive cough. Found with RVP positive for influenza A and coronavirus, GI PCR positive for campylobacter and UA positive. She was started on abx and admitted to [facility], but stepped up to MICU due to increased oxygen needs and increased work of breathing, as well as new pressor requirements for hypotension (though to be vasoplegic in nature). Stepped down to [facility] 5/25, and blood cultures from 5/23 growing coag negative staph, for which she received 1x dose of vancomycin. She was also started on duonebs and solumedrol for COPD exacerbation given new wheezing and respiratory distress.     Pt stepped down to [facility] however noted to have increased work of breathing with accessory muscle use, dyspneic and with audible wheezes. Patient stepped up to MICU again on 5/26 for further management. Patient obtunded and lethargic on MICU, intubated overnight 5/27 and subsequently extubated on 5/27 AM. Patient had extended conversation with Palliative Care. Patient was alert/awake and able to participate in GOC conversation. Patient stating she would not want CPR at end of life if clinical course deteriorated. MOLST filled out. Pt noted to have EF 10% which would qualify patient for home hospice on discharge. BCx from 5/23 noted to be positive for staph capitis -- unclear if contaminant v active infection at the time and started on Empiric Vancomycin (dosed by level) and Zosyn. Patient discontinued from vancomycin pm 5/29 given cleared BCx and active WAI/ worsening kidney function. Pt continued on Zosyn pseudomonal coverage for x5day course.     Patient seen by PT/OT and performing well. Patient doing well with BiPAP overnight and NC throughout the day. IF patient not tolerating BiPAP, can use HFNC for flow needs. Patient with arthritic pain managed well with IV Tylenol and/or Dilaudid Patient resumed on home Lasix 40mg BID i/s/o HFrEF. Patient with poor PO intake and mild hypernatremia noted on AM labs on 5/29. Avoiding excess fluids at this time, encouraging PO intake and lasix discontinued.  Complete 5 day pseudomonal coverage, vancomycin discontinued. Pt DNR/DNI. Tolerating alternating NC and BIPAP at bedtime. Patient seen/working with PT and OT. [facility]. Pt with Afib with RVR, received metoprolol tartrate 5 mg q6h --> Afib resolved. Pt made comfort care on 5/30.     Pt X on X 2/2 acute hypoxic respiratory failure 2/2 COPD exacerbation 2/2 upper respiratory viral illness superimposed by bacterial pneumonia.     OVERNIGHT EVENTS:  1. Pt tachycardic to 120-130s, pt denied CP, EKG showed afib with RVR ---> received metoprolol tartrate 2.5 mg ---> NSR, HR 80s.     SUBJECTIVE / INTERVAL HPI: Patient seen and examined at bedside. Pt on BiPAP, comfortable. Opened eyes to voice and tracking. Pt denied any pain.     Pt removed from BiPAP, and placed on 6L NC for breakfast. However, iWOB and oxygen sat down to 70s, placed back on BiPAP.     MEDICATIONS  (STANDING):  acetylcysteine 20%  Inhalation 4 milliLiter(s) Inhalation every 4 hours  albuterol/ipratropium for Nebulization 3 milliLiter(s) Nebulizer every 4 hours  atorvastatin 40 milliGRAM(s) Oral at bedtime  heparin   Injectable 5000 Unit(s) SubCutaneous every 8 hours  levothyroxine Injectable 60 MICROGram(s) IV Push at bedtime  metoprolol tartrate Injectable 2.5 milliGRAM(s) IV Push every 6 hours  pantoprazole  Injectable 40 milliGRAM(s) IV Push every 24 hours  piperacillin/tazobactam IVPB.. 4.5 Gram(s) IV Intermittent every 12 hours    MEDICATIONS  (PRN):  melatonin 3 milliGRAM(s) Oral at bedtime PRN Insomnia    Allergies    No Known Allergies    Intolerances        VITAL SIGNS:  Vital Signs Last 24 Hrs  T(C): 37.7 (30 May 2022 06:20), Max: 37.7 (30 May 2022 06:20)  T(F): 99.8 (30 May 2022 06:20), Max: 99.8 (30 May 2022 06:20)  HR: 94 (30 May 2022 08:13) (90 - 132)  BP: 134/76 (30 May 2022 08:13) (129/76 - 157/90)  BP(mean): 100 (30 May 2022 08:13) (94 - 116)  RR: 18 (30 May 2022 08:13) (16 - 37)  SpO2: 95% (30 May 2022 08:13) (91% - 99%)      05-29-22 @ 07:01  -  05-30-22 @ 07:00  --------------------------------------------------------  IN: 100 mL / OUT: 150 mL / NET: -50 mL        PHYSICAL EXAM:  General: NAD, on BiPAP  HEENT: NC/AT, anicteric sclera  Neck: supple  Cardiovascular: +S1/S2, RRR, No murmurs, rubs, gallops  Respiratory: CTA B/L, no W/R/R  Gastrointestinal: soft, NT/ND, +BSx4  Extremities: WWP, no clubbing or cyanosis, 1+ edema in b/l UE and LE, mottling noted  Vascular: 2+ radial, DP/PT pulses B/L  Neurological: AAOx2, no focal deficits      LABS:                        9.6    11.00 )-----------( 298      ( 30 May 2022 05:55 )             31.2     05-30    141  |  104  |  59<H>  ----------------------------<  218<H>  4.2   |  18<L>  |  3.86<H>    Ca    8.8      30 May 2022 05:55  Phos  5.2     05-30  Mg     2.1     05-30    TPro  6.2  /  Alb  3.1<L>  /  TBili  0.6  /  DBili  x   /  AST  56<H>  /  ALT  44  /  AlkPhos  173<H>  05-30        CAPILLARY BLOOD GLUCOSE              RADIOLOGY & ADDITIONAL TESTS: Reviewed.

## 2022-05-30 NOTE — PROGRESS NOTE ADULT - PROBLEM SELECTOR PLAN 5
UA + for nitrites, LE, WBC, blood, bacteria although patient w/o any symptoms and no WBC elevation, and renal epithelial cells in UA as well. Becerra was placed in the ED, draining dark yellow urine.    - c/w CTX 1g qd for 3d
Known h/o HTN, takes Valsartan 320 mg qd, furosemide 20 mg qd    - patient no longer requiring Levophed since 7pm on 5/24 -- maintaining MAPs >65   - resume home meds as appropriate   - normotensive at this time     #HLD  Known h/o HLD, takes rosuvastatin 10mg qhs at home.  - c/w atorvastatin 40 mg qhs.
Known h/o HTN, takes Valsartan 320 mg qd, furosemide 20 mg qd    - patient no longer requiring Levophed since 7pm on 5/24 -- maintaining MAPs >65   - resume home meds as appropriate   - normotensive at this time     #HLD  Known h/o HLD, takes rosuvastatin 10mg qhs at home.  - c/w atorvastatin 40 mg qhs.
UA + for nitrites, LE, WBC, blood, bacteria although patient w/o any symptoms and no WBC elevation, and renal epithelial cells in UA as well. Becerra was placed in the ED, draining dark yellow urine.    - c/w CTX 1g qd for 5d until 5/27/22

## 2022-05-30 NOTE — PROGRESS NOTE ADULT - ATTENDING COMMENTS
Acute hypoxemic respiratory failure 2/2 non SARS COV2 coronavirus, influenza A c/b campylobacter diarrhea. Returned to MICU with worsening respiratory status, required mechanical ventilation for WOB. Did well post extubation on HFNC but has deteriorated overnight with increased tachypnea, hypoxemia, and poor mentation. Discussed with family at bedside and will proceed with comfort measures. Morphine as needed; will remove NIV as tolerated. DNR/DNI.

## 2022-05-30 NOTE — PROGRESS NOTE ADULT - ASSESSMENT
89F with pmhx of CKD V (baseline Cr 3.5), HTN, depression who is presenting for 2-3 days of diarrhea and URI like symptoms. Nephrology consulted for CKD management and acidosis    Assessment/Plan:   #oliguric-WAI on CKD Stage V (Baseline Cr 3.5)  #Hypernatremia (resolved)  Creatinine still uptrending likely in setting of poor PO intake. Aide trying to feed today. Hypernatremia resolved. Remains oliguric in setting of poor PO intake  -Trend BMP daily  -Encourage PO intake  -Avoid nephrotoxic meds  -Monitor I/O's  -Renal diet    Tonya Sims D.O.  PGY 4 - Nephrology Fellow  [phone]

## 2022-05-30 NOTE — PROVIDER CONTACT NOTE (CHANGE IN STATUS NOTIFICATION) - SITUATION
Transfer to [facility]. Patient had increased work of breathing after attempting to transfer from BiPAP to HFNC. Put back on BiPAP and given albuterol treatment. A&O x 4. Sinus bradycardic to NSR. 6 L NC. Put on BiPAP after desaturation to low 80s. MD Loco at bedside. Gave albuterol and solumedrol 40 mg IVP. Tried to wean off BiPAP to give morning medications unsuccessfully. Patient remains on BiPAP. Report given to AUBREE Vasquez for continued care.
Patient was taken off bipap and placed on 6L NC. During this time patient became dyspnic and tachycardic 140's. Bipap placed on patient, metoprolol tartrate 5mg given. Team had difficulty getting a good wave form for the SpO2 during this episode, however now spo2 is 100% on fio2 100%. Will reassess.
patient febrile 101.5 rectal, HR 130S, tremors present on assessment, tachypneic.

## 2022-05-30 NOTE — PROGRESS NOTE ADULT - PROBLEM SELECTOR PLAN 3
Endorses cough for past 2-3 days and RVP+ for coronavirus and influenza A (not COVID19).    - symptomatic management w/ IVF resuscitation, tylenol, encouraging PO intake and supplemental O2 in form of HFNC   - BiPAP at night  - c/w zosyn for c/f of superimposed bacterial PNA  - holding vanc i/s/o worsening kidney function      #positive blood cultures  blood culture on 5/23 growing staph capitis and staph epidermidis (contaminated)      - repeat blood culture on 5/26 N[facility] Endorses cough for past 2-3 days and RVP+ for coronavirus and influenza A (not COVID19).    - symptomatic management w/ IVF resuscitation, tylenol, encouraging PO intake and supplemental O2 in form of HFNC   - BiPAP at night  - c/w zosyn for c/f of superimposed bacterial PNA (pseudomonal PNA) till June 2nd 2022  - holding vanc i/s/o worsening kidney function    #positive blood cultures  blood culture on 5/23 growing staph capitis and staph epidermidis (contaminated)  - repeat blood culture on 5/26 NGTD GOC conversation occured between patient and palliative at bedside. Pt stating she would not want the tube to be replaced if respiratory status declined, but would rather want O2 and medications to assist with comfort. MOLST filled out and updated. Pt with EF 10% -- would quality with home w/ hospice upon DC    - Pt DNR/DNI   - c/w comfort care measures

## 2022-05-30 NOTE — PROGRESS NOTE ADULT - SUBJECTIVE AND OBJECTIVE BOX
Patient is a 89y Female seen and evaluated at bedside seen this morning, downgraded to [facility]. Being fed jello-o. Aide trying to encourage more PO intake. Cr noted bump to 3.86, Hgb 9.6, K 4.2       Meds:    acetylcysteine 20%  Inhalation 4 every 4 hours  albuterol/ipratropium for Nebulization 3 every 4 hours  atorvastatin 40 at bedtime  heparin   Injectable 5000 every 8 hours  levothyroxine Injectable 60 at bedtime  melatonin 3 at bedtime PRN  metoprolol tartrate Injectable 5 every 6 hours  pantoprazole  Injectable 40 every 24 hours  piperacillin/tazobactam IVPB.. 4.5 every 12 hours      T(C): , Max: 37.7 (05-30-22 @ 06:20)  T(F): , Max: 99.8 (05-30-22 @ 06:20)  HR: 84 (05-30-22 @ 10:10)  BP: 139/103 (05-30-22 @ 10:10)  BP(mean): 112 (05-30-22 @ 10:10)  RR: 18 (05-30-22 @ 08:13)  SpO2: 100% (05-30-22 @ 10:10)  Wt(kg): --    05-29 @ 07:01  -  05-30 @ 07:00  --------------------------------------------------------  IN: 100 mL / OUT: 150 mL / NET: -50 mL    05-30 @ 07:01  -  05-30 @ 10:52  --------------------------------------------------------  IN: 100 mL / OUT: 0 mL / NET: 100 mL          Review of Systems:  ROS negative except as per HPI      PHYSICAL EXAM:  GENERAL: NAD on NC  NECK: supple, No JVD  CHEST/LUNG: wheezing slightly better this AM  HEART: tachycardic no m/r/g  ABDOMEN: Soft, Nontender, +BS, No flank tenderness bilateral  EXTREMITIES: cool, dry no edema appreciated      LABS:                        9.6    11.00 )-----------( 298      ( 30 May 2022 05:55 )             31.2     05-30    141  |  104  |  59<H>  ----------------------------<  218<H>  4.2   |  18<L>  |  3.86<H>    Ca    8.8      30 May 2022 05:55  Phos  5.2     05-30  Mg     2.1     05-30    TPro  6.2  /  Alb  3.1<L>  /  TBili  0.6  /  DBili  x   /  AST  56<H>  /  ALT  44  /  AlkPhos  173<H>  05-30                RADIOLOGY & ADDITIONAL STUDIES:

## 2022-05-30 NOTE — PROGRESS NOTE ADULT - PROBLEM SELECTOR PLAN 6
Known h/o HTN, takes Valsartan 320 mg qd, furosemide 20 mg qd  - holding BP meds i/s/o sepsis and severe hypotension    #HLD  Known h/o HLD, takes rosuvastatin 10mg qhs at home.  - c/w atorvastatin 40 mg qhs
Known h/o HTN, takes Valsartan 320 mg qd, furosemide 20 mg qd    - holding BP meds i/s/o sepsis and severe hypotension  - official med rec then reintroduce meds when appropriate      #HLD  Known h/o HLD, takes rosuvastatin 10mg qhs at home.  - c/w atorvastatin 40 mg qhs
Presented with Cr 3.22 which appears to be close to baseline. Currently producing urine.    - Cr 3.41 on AM labs -- likely i/s/o lasix resumption and poor PO intake. Lasix discontinued.   - Currently at baseline    - avoid nephrotoxic medications  - strict Is/Os with simpson in place  - maintain MAP goal >60; off of pressors at the moment
Presented with Cr 3.22 which appears to be close to baseline. Currently producing urine.    - Cr 3.41 on AM labs -- likely i/s/o lasix resumption and poor PO intake. Lasix discontinued.   - Currently at baseline    - avoid nephrotoxic medications  - strict Is/Os with simpson in place  - maintain MAP goal >60; off of pressors at the moment

## 2022-05-30 NOTE — GOALS OF CARE CONVERSATION - ADVANCED CARE PLANNING - TREATMENT GUIDELINES
Comfort measures only/Do not re-hospitalize/No blood draws/No artificial nutrition/No antibiotics/No IV fluids DNR Order/Comfort measures only/Do not re-hospitalize/No blood draws/No artificial nutrition/No antibiotics/No IV fluids

## 2022-05-30 NOTE — PROGRESS NOTE ADULT - PROBLEM SELECTOR PLAN 8
Has known chronic R heel ulceration. Follows with Dr. Oliva (surgery). Currently wrapped in clean kerlix.    - wound care nurse consult while inpatient
Has known chronic R heel ulceration. Follows with Dr. Oliva (surgery). Currently wrapped in clean kerlix.    - wound care nurse consult while inpatient -- f/u recs
Has known chronic R heel ulceration. Follows with Dr. Oliva (surgery). Currently wrapped in clean kerlix.  - c/w wound care
Has known chronic R heel ulceration. Follows with Dr. Oliva (surgery). Currently wrapped in clean kerlix.    - wound care nurse consult while inpatient -- f/u recs

## 2022-05-30 NOTE — PROGRESS NOTE ADULT - ASSESSMENT
89 yo F PMH HTN HLD, CKD (baseline Cr ~3), hypothyroidism, COPD, Depression, Gout, R heel ulcer presented 5/23 w/ nonbloody diarrhea, found to be septic and positive for coronavirus and influenza A, as well as GI PCR+ for campylobacter, course further c/b staph bacteremia (?contaminant), now complicated by COPD exacerbation  89 yo DNR/DNI F PMH HTN HLD, CKD (baseline Cr ~3), hypothyroidism, COPD, Depression, Gout, R heel ulcer presented 5/23 w/ nonbloody diarrhea, found to be septic and positive for coronavirus and influenza A, as well as GI PCR+ for campylobacter, course further c/b staph bacteremia (?contaminant), now complicated by COPD exacerbation, now comfort care

## 2022-05-30 NOTE — PROGRESS NOTE ADULT - PROBLEM SELECTOR PROBLEM 10
COPD, severity to be determined
Prophylactic measure
COPD, severity to be determined
Prophylactic measure

## 2022-05-30 NOTE — PROGRESS NOTE ADULT - PROBLEM SELECTOR PLAN 7
Presented with Cr 3.22 which appears to be close to baseline. Currently producing urine.    - monitor Cr  - avoid nephrotoxic medications
- c/w synthroid 75 mcg qd.
Presented with Cr 3.22 which appears to be close to baseline. Currently producing urine.    - monitor Cr  - avoid nephrotoxic medications
- c/w synthroid 75 mcg qd.

## 2022-05-30 NOTE — PROGRESS NOTE ADULT - NUTRITIONAL ASSESSMENT
This patient has been assessed with a concern for Malnutrition and has been determined to have a diagnosis/diagnoses of Moderate protein-calorie malnutrition.    This patient is being managed with:   Diet NPO-  Except Medications  Entered: May 26 2022  8:49PM    
This patient has been assessed with a concern for Malnutrition and has been determined to have a diagnosis/diagnoses of Moderate protein-calorie malnutrition.    This patient is being managed with:   Diet Renal Restrictions-  For patients receiving Renal Replacement - No Protein Restr No Conc K No Conc Phos Low Sodium  DASH/TLC {Sodium & Cholesterol Restricted} (DASH)  Entered: May 23 2022 10:00AM    
This patient has been assessed with a concern for Malnutrition and has been determined to have a diagnosis/diagnoses of Moderate protein-calorie malnutrition.    This patient is being managed with:   Diet NPO-  Except Medications  Entered: May 26 2022  8:49PM    
This patient has been assessed with a concern for Malnutrition and has been determined to have a diagnosis/diagnoses of Moderate protein-calorie malnutrition.    This patient is being managed with:   Diet Full Liquid-  Entered: May 29 2022  9:04AM    
This patient has been assessed with a concern for Malnutrition and has been determined to have a diagnosis/diagnoses of Moderate protein-calorie malnutrition.    This patient is being managed with:   Diet NPO-  Except Medications  Entered: May 26 2022  8:49PM    
This patient has been assessed with a concern for Malnutrition and has been determined to have a diagnosis/diagnoses of Moderate protein-calorie malnutrition.    This patient is being managed with:   Diet Renal Restrictions-  For patients receiving Renal Replacement - No Protein Restr No Conc K No Conc Phos Low Sodium  DASH/TLC {Sodium & Cholesterol Restricted} (DASH)  Entered: May 23 2022 10:00AM    
This patient has been assessed with a concern for Malnutrition and has been determined to have a diagnosis/diagnoses of Moderate protein-calorie malnutrition.    This patient is being managed with:   Diet Full Liquid-  Entered: May 29 2022  9:04AM

## 2022-05-30 NOTE — PROGRESS NOTE ADULT - PROBLEM SELECTOR PROBLEM 1
Acute respiratory failure with hypoxia
Severe sepsis
Acute respiratory failure with hypoxia
Severe sepsis

## 2022-05-31 NOTE — DISCHARGE NOTE FOR THE EXPIRED PATIENT - HOSPITAL COURSE
89 yo F with PMHx of HTN, HLD, CKD, hypothyroidism, COPD, Depression, Gout, R heel ulcer presented with non-bloody diarrhea along with abd pain and productive cough. Found with RVP positive for influenza A and coronavirus, GI PCR positive for campylobacter and UA positive. She was started on abx and admitted to [facility], but stepped up to MICU due to increased oxygen needs and increased work of breathing, as well as new pressor requirements for hypotension (though to be vasoplegic in nature). Stepped down to [facility] 5/25, and blood cultures from 5/23 growing coag negative staph, for which she received 1x dose of vancomycin. She was also started on duonebs and solumedrol for COPD exacerbation given new wheezing and respiratory distress.     Pt stepped down to [facility] however noted to have increased work of breathing with accessory muscle use, dyspneic and with audible wheezes. Patient stepped up to MICU again on 5/26 for further management. Patient obtunded and lethargic on MICU, intubated overnight 5/27 and subsequently extubated on 5/27 AM. Patient had extended conversation with Palliative Care. Patient was alert/awake and able to participate in GOC conversation. Patient stating she would not want CPR at end of life if clinical course deteriorated. MOLST filled out. Pt noted to have EF 10% which would qualify patient for home hospice on discharge. BCx from 5/23 noted to be positive for staph capitis -- unclear if contaminant v active infection at the time and started on Empiric Vancomycin (dosed by level) and Zosyn. Patient discontinued from vancomycin pm 5/29 given cleared BCx and active WAI/ worsening kidney function. Pt continued on Zosyn pseudomonal coverage for x5day course.      Patient seen by PT/OT and performing well. Patient doing well with BiPAP overnight and NC throughout the day. IF patient not tolerating BiPAP, can use HFNC for flow needs. Patient with arthritic pain managed well with IV Tylenol and/or Dilaudid Patient resumed on home Lasix 40mg BID i/s/o HFrEF. Patient with poor PO intake and mild hypernatremia noted on AM labs on 5/29. Avoiding excess fluids at this time, encouraging PO intake and lasix discontinued.  Complete 5 day pseudomonal coverage, vancomycin discontinued. Pt DNR/DNI. Tolerating alternating NC and BIPAP at bedtime. Patient seen/working with PT and OT. Stable for stepdown to [facility] for further management.     Patient made comfort care and kept comfortable with symptomatic management.  Resident physician called to bedside after telemetry noted to read asystole.  Upon assessment, no spontaneous movements were present. There was not response to verbal, tactile, or painful stimuli. Pupils were mid-dilated and fixed. No breath sounds were appreciated over both lung fields. No radial or carotid pulses were palpable. No heart sounds were auscultated over entire precordium. Patient pronounced dead at 5:55am.  Family and attending physician, were notified.

## 2022-06-09 DIAGNOSIS — E87.4 MIXED DISORDER OF ACID-BASE BALANCE: ICD-10-CM

## 2022-06-09 DIAGNOSIS — A41.89 OTHER SPECIFIED SEPSIS: ICD-10-CM

## 2022-06-09 DIAGNOSIS — B97.29 OTHER CORONAVIRUS AS THE CAUSE OF DISEASES CLASSIFIED ELSEWHERE: ICD-10-CM

## 2022-06-09 DIAGNOSIS — R45.1 RESTLESSNESS AND AGITATION: ICD-10-CM

## 2022-06-09 DIAGNOSIS — J44.1 CHRONIC OBSTRUCTIVE PULMONARY DISEASE WITH (ACUTE) EXACERBATION: ICD-10-CM

## 2022-06-09 DIAGNOSIS — R33.9 RETENTION OF URINE, UNSPECIFIED: ICD-10-CM

## 2022-06-09 DIAGNOSIS — M19.90 UNSPECIFIED OSTEOARTHRITIS, UNSPECIFIED SITE: ICD-10-CM

## 2022-06-09 DIAGNOSIS — E87.0 HYPEROSMOLALITY AND HYPERNATREMIA: ICD-10-CM

## 2022-06-09 DIAGNOSIS — L97.419 NON-PRESSURE CHRONIC ULCER OF RIGHT HEEL AND MIDFOOT WITH UNSPECIFIED SEVERITY: ICD-10-CM

## 2022-06-09 DIAGNOSIS — I48.91 UNSPECIFIED ATRIAL FIBRILLATION: ICD-10-CM

## 2022-06-09 DIAGNOSIS — A41.59 OTHER GRAM-NEGATIVE SEPSIS: ICD-10-CM

## 2022-06-09 DIAGNOSIS — M10.9 GOUT, UNSPECIFIED: ICD-10-CM

## 2022-06-09 DIAGNOSIS — N18.5 CHRONIC KIDNEY DISEASE, STAGE 5: ICD-10-CM

## 2022-06-09 DIAGNOSIS — J15.9 UNSPECIFIED BACTERIAL PNEUMONIA: ICD-10-CM

## 2022-06-09 DIAGNOSIS — I08.1 RHEUMATIC DISORDERS OF BOTH MITRAL AND TRICUSPID VALVES: ICD-10-CM

## 2022-06-09 DIAGNOSIS — R65.21 SEVERE SEPSIS WITH SEPTIC SHOCK: ICD-10-CM

## 2022-06-09 DIAGNOSIS — J10.08 INFLUENZA DUE TO OTHER IDENTIFIED INFLUENZA VIRUS WITH OTHER SPECIFIED PNEUMONIA: ICD-10-CM

## 2022-06-09 DIAGNOSIS — I50.20 UNSPECIFIED SYSTOLIC (CONGESTIVE) HEART FAILURE: ICD-10-CM

## 2022-06-09 DIAGNOSIS — J06.9 ACUTE UPPER RESPIRATORY INFECTION, UNSPECIFIED: ICD-10-CM

## 2022-06-09 DIAGNOSIS — F41.9 ANXIETY DISORDER, UNSPECIFIED: ICD-10-CM

## 2022-06-09 DIAGNOSIS — I13.2 HYPERTENSIVE HEART AND CHRONIC KIDNEY DISEASE WITH HEART FAILURE AND WITH STAGE 5 CHRONIC KIDNEY DISEASE, OR END STAGE RENAL DISEASE: ICD-10-CM

## 2022-06-09 DIAGNOSIS — E44.0 MODERATE PROTEIN-CALORIE MALNUTRITION: ICD-10-CM

## 2022-06-09 DIAGNOSIS — E78.5 HYPERLIPIDEMIA, UNSPECIFIED: ICD-10-CM

## 2022-06-09 DIAGNOSIS — I44.0 ATRIOVENTRICULAR BLOCK, FIRST DEGREE: ICD-10-CM

## 2022-06-09 DIAGNOSIS — N39.0 URINARY TRACT INFECTION, SITE NOT SPECIFIED: ICD-10-CM

## 2022-06-09 DIAGNOSIS — Z66 DO NOT RESUSCITATE: ICD-10-CM

## 2022-06-09 DIAGNOSIS — A04.5 CAMPYLOBACTER ENTERITIS: ICD-10-CM

## 2022-06-09 DIAGNOSIS — J96.21 ACUTE AND CHRONIC RESPIRATORY FAILURE WITH HYPOXIA: ICD-10-CM

## 2022-06-09 DIAGNOSIS — E86.0 DEHYDRATION: ICD-10-CM

## 2022-06-09 DIAGNOSIS — N17.9 ACUTE KIDNEY FAILURE, UNSPECIFIED: ICD-10-CM

## 2022-06-09 DIAGNOSIS — F32.A DEPRESSION, UNSPECIFIED: ICD-10-CM

## 2022-06-09 DIAGNOSIS — A41.9 SEPSIS, UNSPECIFIED ORGANISM: ICD-10-CM

## 2022-06-09 DIAGNOSIS — Z51.5 ENCOUNTER FOR PALLIATIVE CARE: ICD-10-CM

## 2022-06-09 DIAGNOSIS — E03.8 OTHER SPECIFIED HYPOTHYROIDISM: ICD-10-CM

## 2022-06-09 DIAGNOSIS — R53.81 OTHER MALAISE: ICD-10-CM

## 2022-08-31 NOTE — PROGRESS NOTE ADULT - PROBLEM/PLAN-6
DISPLAY PLAN FREE TEXT
Hydroxyzine Counseling: Patient advised that the medication is sedating and not to drive a car after taking this medication.  Patient informed of potential adverse effects including but not limited to dry mouth, urinary retention, and blurry vision.  The patient verbalized understanding of the proper use and possible adverse effects of hydroxyzine.  All of the patient's questions and concerns were addressed.

## 2022-09-19 NOTE — ED PROVIDER NOTE - CROS ED GU ALL NEG
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 hemorrhoids    CYSTOURETHROSCOPY  2014    with stent    HEMORRHOID SURGERY      HERNIA REPAIR Right 2000    Right groin    SMALL INTESTINE SURGERY  2000    partial small bowel resection due to radiation induced stricture    UPPER GASTROINTESTINAL ENDOSCOPY  2007    [facility] - small antral nodule       Social History     Socioeconomic History    Marital status:       Spouse name: Not on file    Number of children: Not on file    Years of education: Not on file    Highest education level: Not on file   Occupational History    Not on file   Tobacco Use    Smoking status: Former     Packs/day: 1.00     Years: 20.00     Pack years: 20.00     Types: Cigarettes     Quit date: 10/17/1980     Years since quittin.9    Smokeless tobacco: Never   Vaping Use    Vaping Use: Never used   Substance and Sexual Activity    Alcohol use: No    Drug use: No    Sexual activity: Not on file   Other Topics Concern    Not on file   Social History Narrative    Not on file     Social Determinants of Health     Financial Resource Strain: Low Risk     Difficulty of Paying Living Expenses: Not hard at all   Food Insecurity: No Food Insecurity    Worried About Running Out of Food in the Last Year: Never true    [address] in the Last Year: Never true   Transportation Needs: Not on file   Physical Activity: Insufficiently Active    Days of Exercise per Week: 3 days    Minutes of Exercise per Session: 20 min   Stress: Not on file   Social Connections: Not on file   Intimate Partner Violence: Not on file   Housing Stability: Not on file       Family History   Problem Relation Age of Onset    Colon Cancer Mother        Current Outpatient Medications   Medication Sig Dispense Refill    levETIRAcetam (KEPPRA) 500 MG tablet Take 1 tablet by mouth 2 times daily 180 tablet 3    pravastatin (PRAVACHOL) 20 MG tablet Take 1 tablet by mouth once daily 90 tablet 3    cyanocobalamin 1000 MCG/ML injection Inject 1 mL into the muscle every 30 days Sub qu monthly 1 mL 11    aspirin 81 MG EC tablet Take 81 mg by mouth 2 times daily       Multiple Vitamins-Minerals (THERAPEUTIC MULTIVITAMIN-MINERALS) tablet Take 1 tablet by mouth daily      Magnesium Hydroxide (DULCOLAX PO) Take by mouth as needed      loperamide (IMODIUM) 2 MG capsule Take 2 mg by mouth as needed for Diarrhea        No current facility-administered medications for this visit. DATA:  Lab Results   Component Value Date    WBC 6.5 04/19/2022    HGB 12.9 (L) 04/19/2022     04/19/2022    CHOL 90 04/19/2022    TRIG 68 04/19/2022    HDL 51 04/19/2022    ALT 25 04/19/2022    AST 32 04/19/2022     04/19/2022    K 3.3 (L) 04/19/2022     04/19/2022    CREATININE 0.73 04/19/2022    BUN 11 04/19/2022    CO2 31 04/19/2022    TSH 1.68 08/22/2018       /82 (Site: Left Upper Arm, Position: Sitting, Cuff Size: Medium Adult)   Pulse 57   Temp 97.6 °F (36.4 °C) (Temporal)   Resp 18   Ht 6' 2\" (1.88 m)   Wt 144 lb 4.8 oz (65.5 kg)   BMI 18.53 kg/m²     NEUROLOGICAL EXAMINATION:     MENTAL STATUS:.  Normal.    CRANIAL NERVES: Pupils are equal and reactive. EOMS are equal.  No abnormal eye movements. Facial sensation is normal.  No facial weakness. Hearing loss +. .  Hearing aids +. Palate and tongue movements are normal.  Shoulder shrug is symmetrical.    MOTOR EXAMINATION:.  No focal extremity weakness. No abnormal limb movements. SENSORY EXAMINATION: Normal.     STRETCH REFLEXES: Symmetrical in both the upper and lower limbs. GAIT:.  Normal.    IMPRESSION:    1. Seizure disorder.   Doing well on Keppra 500 mg twice daily  2. History of asymptomatic bradycardia. Needs to see cardiology    PLAN:    1. Continue Keppra 500 mg twice daily. 2.  Follow-up in 6 months      NOTE: This neurology evaluation is part of outpatient coverage at [facility]  1-2 days per week. Patients requiring frequent evaluations or uncomfortable with potential 3-4 day turnaround on questions or calls  may be better served by a neurologist in the area full time. [facility] at [facility]. [facility] is available for outpatient visits and procedures including EMG/NCS. Non-[facility] and the [facility] neurologists also practice in [facility] (Dr. Yifan Mir) and [facility] (Anjel Thorne).        pAple Page MD   9/19/2022  4:31 PM - - -

## 2022-10-05 NOTE — PROGRESS NOTE ADULT - PROBLEM SELECTOR PLAN 1
MTM to inform pt he's scheduled at 9:40am on tomorrow and per Dr. Trejo to come in with wife at her appt time    Rerouting for 2nd attempt   - COPD plan as below  - Upper respiratory illness plan as below

## 2022-12-14 NOTE — ED ADULT TRIAGE NOTE - NS ED NURSE BANDS TYPE
Follow up appointment scheduled per request from BERNA Mahan for RBL. Called patient to discuss. No answer. Message left. Appointment slip mailed.   Name band;

## 2023-01-23 NOTE — ED PROCEDURE NOTE - CPROC ED ANATOMIC LOCATION1
Detail Level: Detailed Add 38432 Cpt? (Important Note: In 2017 The Use Of 38523 Is Being Tracked By Cms To Determine Future Global Period Reimbursement For Global Periods): yes Wound Evaluated By: NR left lower leg

## 2023-04-07 NOTE — ED PROVIDER NOTE - NS ED ATTENDING STATEMENT MOD
Attending Only Cyclosporine Counseling:  I discussed with the patient the risks of cyclosporine including but not limited to hypertension, gingival hyperplasia,myelosuppression, immunosuppression, liver damage, kidney damage, neurotoxicity, lymphoma, and serious infections. The patient understands that monitoring is required including baseline blood pressure, CBC, CMP, lipid panel and uric acid, and then 1-2 times monthly CMP and blood pressure.

## 2023-05-03 NOTE — PROGRESS NOTE ADULT - PROBLEM SELECTOR PLAN 1
Caller: Christen Marie    COPD plan as below  - Upper respiratory illness plan as below 2/2 COPD exacerbation 2/2 viral upper respiratory illness  H/o COPD. Patient denies this or any smoking history although per chart review patient has diagnosis. Per outpatient med review pt appears to be on Symbicort 160mcg-4.5mcg 1 puff BID at home  RRT called AM of 5/26 for respiratory distress, with diffuse expiratory wheezing on exam and accessory muscle use on HFNC, c/w acute exacerbation     - c/w duonebs standing q4h  - c/w mucomyst  - c/w comfort care measures

## 2024-06-25 NOTE — ED ADULT NURSE NOTE - NS ED PATIENT SAFETY CONCERN
Received request via: Pharmacy    Was the patient seen in the last year in this department? Yes5/14/24    Does the patient have an active prescription (recently filled or refills available) for medication(s) requested? No    Pharmacy Name: [facility]    Does the patient have [facility] Plus and need 100 day supply (blood pressure, diabetes and cholesterol meds only)? Medication is not for cholesterol, blood pressure or diabetes  
No

## 2024-09-17 NOTE — PROGRESS NOTE ADULT - PROBLEM SELECTOR PLAN 8
Six Minute Walk Test:  Probe: (Finger.) Stops: (0)   Patient walked (960Ft /292 m) in 6 minutes. LLN = (1374Ft /419 m)  O2 system: none  Pre-walk: SP02 (94%) Heart Rate (71) Michael Dyspnea = (0) Fatigue = (6)  On RA: Lowest SPO2 (90%) Highest HR: (93)       Post-walk: SP02 (92%) Heart Rate (82 ) Michael Dyspnea = (2) Fatigue = (7)  Recovery time to baseline 02 SAT (1.5) minutes and HR (1) minutes.  Patient reports walk distance may have been affected by ( no limitations)     
History of COPD/reactive airway disease. CXR with pulmonary vascular congestion. Saturating well on room air  - c/w home albuterol
History of COPD/reactive airway disease. CXR with pulmonary vascular congestion. Saturating well on room air  - c/w home albuterol

## 2024-09-23 NOTE — ED ADULT NURSE NOTE - PMH
PAST MEDICAL HISTORY:  No pertinent past medical history      Chronic kidney disease, stage IV (severe)    Essential hypertension    Other specified hypothyroidism    Urinary retention